# Patient Record
Sex: FEMALE | Race: OTHER | Employment: OTHER | URBAN - METROPOLITAN AREA
[De-identification: names, ages, dates, MRNs, and addresses within clinical notes are randomized per-mention and may not be internally consistent; named-entity substitution may affect disease eponyms.]

---

## 2017-01-10 ENCOUNTER — TELEPHONE (OUTPATIENT)
Dept: INTERNAL MEDICINE CLINIC | Facility: CLINIC | Age: 72
End: 2017-01-10

## 2017-01-10 ENCOUNTER — OFFICE VISIT (OUTPATIENT)
Dept: INTERNAL MEDICINE CLINIC | Facility: CLINIC | Age: 72
End: 2017-01-10

## 2017-01-10 ENCOUNTER — TELEPHONE (OUTPATIENT)
Dept: OPHTHALMOLOGY | Facility: CLINIC | Age: 72
End: 2017-01-10

## 2017-01-10 VITALS
BODY MASS INDEX: 23.56 KG/M2 | TEMPERATURE: 98 F | DIASTOLIC BLOOD PRESSURE: 73 MMHG | SYSTOLIC BLOOD PRESSURE: 129 MMHG | WEIGHT: 138 LBS | HEIGHT: 64 IN | RESPIRATION RATE: 18 BRPM | HEART RATE: 65 BPM

## 2017-01-10 DIAGNOSIS — E78.2 MIXED HYPERLIPIDEMIA: Primary | ICD-10-CM

## 2017-01-10 DIAGNOSIS — H25.9 SENILE CATARACT, UNSPECIFIED AGE-RELATED CATARACT TYPE, UNSPECIFIED LATERALITY: Primary | ICD-10-CM

## 2017-01-10 DIAGNOSIS — Z12.31 VISIT FOR SCREENING MAMMOGRAM: ICD-10-CM

## 2017-01-10 DIAGNOSIS — E03.9 ACQUIRED HYPOTHYROIDISM: ICD-10-CM

## 2017-01-10 DIAGNOSIS — H25.13 AGE-RELATED NUCLEAR CATARACT OF BOTH EYES: ICD-10-CM

## 2017-01-10 DIAGNOSIS — J45.20 MILD INTERMITTENT ASTHMA WITHOUT COMPLICATION: ICD-10-CM

## 2017-01-10 PROCEDURE — G0463 HOSPITAL OUTPT CLINIC VISIT: HCPCS | Performed by: INTERNAL MEDICINE

## 2017-01-10 PROCEDURE — 99214 OFFICE O/P EST MOD 30 MIN: CPT | Performed by: INTERNAL MEDICINE

## 2017-01-10 RX ORDER — LEVOTHYROXINE SODIUM 0.07 MG/1
75 TABLET ORAL
Qty: 90 TABLET | Refills: 3 | Status: SHIPPED | OUTPATIENT
Start: 2017-01-10 | End: 2017-12-18

## 2017-01-10 RX ORDER — LOVASTATIN 40 MG/1
TABLET ORAL
Qty: 90 TABLET | Refills: 3 | Status: SHIPPED | OUTPATIENT
Start: 2017-01-10 | End: 2017-09-09

## 2017-01-10 RX ORDER — MONTELUKAST SODIUM 10 MG/1
10 TABLET ORAL NIGHTLY
Qty: 90 TABLET | Refills: 3 | Status: SHIPPED | OUTPATIENT
Start: 2017-01-10 | End: 2018-02-23

## 2017-01-10 NOTE — TELEPHONE ENCOUNTER
Spoke to pts daughter and she would like information on [place] [id number] [facility] for her mom to have cataract surgery done. Information given and faxed over to [address] [place] [address].

## 2017-01-10 NOTE — TELEPHONE ENCOUNTER
Patient's daughter requesting referral for a different Ophthalmology than the one Dr. Benjamin Leonard referred the patient to  There is a referral in [facility] for Dr. Leny Mayer see Referral# [id number] but patients daughter states he no longer does the type of surge

## 2017-01-18 ENCOUNTER — HOSPITAL ENCOUNTER (OUTPATIENT)
Dept: MAMMOGRAPHY | Age: 72
Discharge: HOME | End: 2017-01-18
Attending: INTERNAL MEDICINE
Payer: MEDICARE

## 2017-01-18 DIAGNOSIS — Z12.31 VISIT FOR SCREENING MAMMOGRAM: ICD-10-CM

## 2017-01-18 PROCEDURE — 77067 SCR MAMMO BI INCL CAD: CPT

## 2017-03-02 ENCOUNTER — TELEPHONE (OUTPATIENT)
Dept: OPHTHALMOLOGY | Facility: CLINIC | Age: 72
End: 2017-03-02

## 2017-04-10 ENCOUNTER — HOSPITAL ENCOUNTER (EMERGENCY)
Facility: HOSPITAL | Age: 72
Discharge: HOME | End: 2017-04-10
Attending: EMERGENCY MEDICINE
Payer: MEDICARE

## 2017-04-10 VITALS
TEMPERATURE: 97 F | HEIGHT: 64 IN | HEART RATE: 73 BPM | OXYGEN SATURATION: 96 % | SYSTOLIC BLOOD PRESSURE: 126 MMHG | RESPIRATION RATE: 18 BRPM | WEIGHT: 138 LBS | BODY MASS INDEX: 23.56 KG/M2 | DIASTOLIC BLOOD PRESSURE: 59 MMHG

## 2017-04-10 DIAGNOSIS — K52.9 GASTROENTERITIS: Primary | ICD-10-CM

## 2017-04-10 PROCEDURE — 83690 ASSAY OF LIPASE: CPT | Performed by: EMERGENCY MEDICINE

## 2017-04-10 PROCEDURE — 96375 TX/PRO/DX INJ NEW DRUG ADDON: CPT

## 2017-04-10 PROCEDURE — 80053 COMPREHEN METABOLIC PANEL: CPT | Performed by: EMERGENCY MEDICINE

## 2017-04-10 PROCEDURE — 99284 EMERGENCY DEPT VISIT MOD MDM: CPT

## 2017-04-10 PROCEDURE — 96374 THER/PROPH/DIAG INJ IV PUSH: CPT

## 2017-04-10 PROCEDURE — 96361 HYDRATE IV INFUSION ADD-ON: CPT

## 2017-04-10 PROCEDURE — 85025 COMPLETE CBC W/AUTO DIFF WBC: CPT | Performed by: EMERGENCY MEDICINE

## 2017-04-10 RX ORDER — ONDANSETRON 2 MG/ML
4 INJECTION INTRAMUSCULAR; INTRAVENOUS ONCE
Status: COMPLETED | OUTPATIENT
Start: 2017-04-10 | End: 2017-04-10

## 2017-04-10 RX ORDER — METOCLOPRAMIDE HYDROCHLORIDE 5 MG/ML
10 INJECTION INTRAMUSCULAR; INTRAVENOUS ONCE
Status: COMPLETED | OUTPATIENT
Start: 2017-04-10 | End: 2017-04-10

## 2017-04-10 RX ORDER — DICYCLOMINE HYDROCHLORIDE 10 MG/5ML
20 SOLUTION ORAL ONCE
Status: COMPLETED | OUTPATIENT
Start: 2017-04-10 | End: 2017-04-10

## 2017-04-10 RX ORDER — ONDANSETRON 4 MG/1
4 TABLET, ORALLY DISINTEGRATING ORAL EVERY 6 HOURS PRN
Qty: 10 TABLET | Refills: 0 | Status: SHIPPED | OUTPATIENT
Start: 2017-04-10 | End: 2017-04-17

## 2017-04-10 RX ORDER — MAGNESIUM HYDROXIDE/ALUMINUM HYDROXICE/SIMETHICONE 120; 1200; 1200 MG/30ML; MG/30ML; MG/30ML
30 SUSPENSION ORAL ONCE
Status: COMPLETED | OUTPATIENT
Start: 2017-04-10 | End: 2017-04-10

## 2017-04-11 ENCOUNTER — OFFICE VISIT (OUTPATIENT)
Dept: INTERNAL MEDICINE CLINIC | Facility: CLINIC | Age: 72
End: 2017-04-11

## 2017-04-11 VITALS
RESPIRATION RATE: 17 BRPM | HEIGHT: 64 IN | HEART RATE: 65 BPM | BODY MASS INDEX: 28.51 KG/M2 | TEMPERATURE: 98 F | DIASTOLIC BLOOD PRESSURE: 78 MMHG | WEIGHT: 167 LBS | OXYGEN SATURATION: 97 % | SYSTOLIC BLOOD PRESSURE: 130 MMHG

## 2017-04-11 DIAGNOSIS — D22.9 NUMEROUS MOLES: ICD-10-CM

## 2017-04-11 DIAGNOSIS — R73.9 HYPERGLYCEMIA: ICD-10-CM

## 2017-04-11 DIAGNOSIS — Z78.0 POSTMENOPAUSAL: ICD-10-CM

## 2017-04-11 DIAGNOSIS — J44.9 ASTHMA WITH COPD (HCC): Primary | ICD-10-CM

## 2017-04-11 DIAGNOSIS — K21.9 GASTROESOPHAGEAL REFLUX DISEASE, ESOPHAGITIS PRESENCE NOT SPECIFIED: ICD-10-CM

## 2017-04-11 DIAGNOSIS — K44.9 HIATAL HERNIA: ICD-10-CM

## 2017-04-11 DIAGNOSIS — E78.5 HYPERLIPIDEMIA, UNSPECIFIED HYPERLIPIDEMIA TYPE: ICD-10-CM

## 2017-04-11 PROBLEM — J44.89 ASTHMA WITH COPD (CHRONIC OBSTRUCTIVE PULMONARY DISEASE) (HCC): Chronic | Status: ACTIVE | Noted: 2017-04-11

## 2017-04-11 PROBLEM — J44.89 ASTHMA WITH COPD (CHRONIC OBSTRUCTIVE PULMONARY DISEASE): Chronic | Status: ACTIVE | Noted: 2017-04-11

## 2017-04-11 PROCEDURE — G0463 HOSPITAL OUTPT CLINIC VISIT: HCPCS | Performed by: INTERNAL MEDICINE

## 2017-04-11 PROCEDURE — 99214 OFFICE O/P EST MOD 30 MIN: CPT | Performed by: INTERNAL MEDICINE

## 2017-04-11 PROCEDURE — G0009 ADMIN PNEUMOCOCCAL VACCINE: HCPCS | Performed by: INTERNAL MEDICINE

## 2017-04-11 PROCEDURE — 90732 PPSV23 VACC 2 YRS+ SUBQ/IM: CPT | Performed by: INTERNAL MEDICINE

## 2017-04-12 ENCOUNTER — APPOINTMENT (OUTPATIENT)
Dept: LAB | Age: 72
End: 2017-04-12
Attending: INTERNAL MEDICINE
Payer: MEDICARE

## 2017-04-12 DIAGNOSIS — R73.9 HYPERGLYCEMIA: ICD-10-CM

## 2017-04-12 DIAGNOSIS — E78.5 HYPERLIPIDEMIA, UNSPECIFIED HYPERLIPIDEMIA TYPE: ICD-10-CM

## 2017-04-12 PROCEDURE — 80061 LIPID PANEL: CPT

## 2017-04-12 PROCEDURE — 36415 COLL VENOUS BLD VENIPUNCTURE: CPT

## 2017-04-12 PROCEDURE — 84443 ASSAY THYROID STIM HORMONE: CPT

## 2017-04-12 PROCEDURE — 85027 COMPLETE CBC AUTOMATED: CPT

## 2017-04-12 PROCEDURE — 80053 COMPREHEN METABOLIC PANEL: CPT

## 2017-04-12 PROCEDURE — 81001 URINALYSIS AUTO W/SCOPE: CPT

## 2017-04-12 PROCEDURE — 83036 HEMOGLOBIN GLYCOSYLATED A1C: CPT

## 2017-04-12 PROCEDURE — 84439 ASSAY OF FREE THYROXINE: CPT

## 2017-04-12 NOTE — PROGRESS NOTES
HPI:    Patient ID: Ulysses Kawasaki is a 81year old female. HPI    Follow up  Establish care    . Hx of Asthma COPD   PFT?  Years ago  /78 mmHg  Pulse 65  Temp(Src) 97.8 °F (36.6 °C) (Oral)  Resp 17  Ht 5' 4\" (1.626 m)  Wt 167 lb (75.751 kg)  BMI 2 tablet Rfl: 3   Levothyroxine Sodium 75 MCG Oral Tab Take 1 tablet (75 mcg total) by mouth before breakfast. Disp: 90 tablet Rfl: 3   ADVAIR DISKUS 250-50 MCG/DOSE Inhalation Aerosol Powder, Breath Activated  Disp:  Rfl:    Albuterol Sulfate HFA (VENTOLIN gallop. No murmur heard. Pulmonary/Chest: Effort normal and breath sounds normal. No respiratory distress. She has no wheezes. She has no rales. She exhibits no tenderness. Abdominal: Soft.  Bowel sounds are normal. She exhibits no distension and no m caloric intake    (D22.9) Numerous moles  Plan: DERM - INTERNAL        Referral to derm    Medication reviewed. Refill will be given as needed . Mediation side effect reviewed. Most recent laboratory and diagnostic testing reviewed.   Dietary and lifestyl

## 2017-04-13 NOTE — ED PROVIDER NOTES
Patient Seen in: [facility] Emergency Department    History   Patient presents with:  Nausea/Vomiting/Diarrhea (gastrointestinal)    Stated Complaint: diarrhea vomiting    HPI    80 yo female with nausea, vomiting and diarrhea that began tonight. systems reviewed and negative except as noted above. PSFH elements reviewed from today and agreed except as otherwise stated in HPI.     Physical Exam       ED Triage Vitals   BP 04/10/17 0307 131/78 mmHg   Pulse 04/10/17 0307 75   Resp 04/10/17 0307 17 CBC WITH DIFFERENTIAL WITH PLATELET.   Procedure                               Abnormality         Status                     ---------                               -----------         ------                     CBC W/ DIFFERENTIAL[[id number]]

## 2017-04-17 ENCOUNTER — HOSPITAL ENCOUNTER (OUTPATIENT)
Dept: BONE DENSITY | Facility: HOSPITAL | Age: 72
Discharge: HOME | End: 2017-04-17
Attending: INTERNAL MEDICINE
Payer: MEDICARE

## 2017-04-17 ENCOUNTER — HOSPITAL ENCOUNTER (OUTPATIENT)
Dept: RESPIRATORY THERAPY | Facility: HOSPITAL | Age: 72
Discharge: HOME | End: 2017-04-17
Attending: INTERNAL MEDICINE
Payer: MEDICARE

## 2017-04-17 DIAGNOSIS — Z78.0 POSTMENOPAUSAL: ICD-10-CM

## 2017-04-17 DIAGNOSIS — J44.9 ASTHMA WITH COPD (HCC): Primary | ICD-10-CM

## 2017-04-17 PROCEDURE — 94060 EVALUATION OF WHEEZING: CPT | Performed by: INTERNAL MEDICINE

## 2017-04-17 PROCEDURE — 94726 PLETHYSMOGRAPHY LUNG VOLUMES: CPT | Performed by: INTERNAL MEDICINE

## 2017-04-17 PROCEDURE — 94729 DIFFUSING CAPACITY: CPT | Performed by: INTERNAL MEDICINE

## 2017-04-17 PROCEDURE — 77080 DXA BONE DENSITY AXIAL: CPT

## 2017-04-24 NOTE — ADDENDUM NOTE
Encounter addended by: Vidya Richardson MD on: 4/24/2017 11:33 AM<BR>     Documentation filed: Charges VN, Notes Section

## 2017-04-24 NOTE — PROCEDURES
[facility]    Patient's Name Sheela Alvarez MRN [id number]    1945 Pulmonologist Cinthia Almaraz MD   Location [facility] PCP Sweetie Crenshaw MD     IMPRESSION:    The PFTs are Abnormal.    There is a mild obs

## 2017-04-27 ENCOUNTER — OFFICE VISIT (OUTPATIENT)
Dept: DERMATOLOGY CLINIC | Facility: CLINIC | Age: 72
End: 2017-04-27

## 2017-04-27 DIAGNOSIS — D23.5 BENIGN NEOPLASM OF SKIN OF TRUNK, EXCEPT SCROTUM: ICD-10-CM

## 2017-04-27 DIAGNOSIS — D23.4 BENIGN NEOPLASM OF SCALP AND SKIN OF NECK: ICD-10-CM

## 2017-04-27 DIAGNOSIS — L82.1 SEBORRHEIC KERATOSES: ICD-10-CM

## 2017-04-27 DIAGNOSIS — D22.9 MULTIPLE NEVI: Primary | ICD-10-CM

## 2017-04-27 DIAGNOSIS — D23.30 BENIGN NEOPLASM OF SKIN OF FACE: ICD-10-CM

## 2017-04-27 DIAGNOSIS — D23.60 BENIGN NEOPLASM OF SKIN OF UPPER LIMB, INCLUDING SHOULDER, UNSPECIFIED LATERALITY: ICD-10-CM

## 2017-04-27 PROCEDURE — 99203 OFFICE O/P NEW LOW 30 MIN: CPT | Performed by: DERMATOLOGY

## 2017-04-27 PROCEDURE — G0463 HOSPITAL OUTPT CLINIC VISIT: HCPCS | Performed by: DERMATOLOGY

## 2017-04-30 NOTE — PROGRESS NOTES
HPI:    Patient ID: Bowen Stahl is a 81year old female. HPIHyperlipidemia. Patient has been following low cholesterol diet and has been taking and tolerating Cholesterol medicine as prescribed.  No serious side effects such as rash, muscle tenderness o normal and breath sounds normal.   Abdominal: Soft. Bowel sounds are normal.   Neurological: She is alert and oriented to person, place, and time. She has normal reflexes. Skin: Skin is warm and dry.               ASSESSMENT/PLAN:   Mixed hyperlipidemia

## 2017-05-01 ENCOUNTER — APPOINTMENT (OUTPATIENT)
Dept: CT IMAGING | Facility: HOSPITAL | Age: 72
End: 2017-05-01
Attending: EMERGENCY MEDICINE
Payer: MEDICARE

## 2017-05-01 ENCOUNTER — HOSPITAL ENCOUNTER (EMERGENCY)
Facility: HOSPITAL | Age: 72
Discharge: HOME | End: 2017-05-01
Attending: EMERGENCY MEDICINE
Payer: MEDICARE

## 2017-05-01 ENCOUNTER — TELEPHONE (OUTPATIENT)
Dept: INTERNAL MEDICINE CLINIC | Facility: CLINIC | Age: 72
End: 2017-05-01

## 2017-05-01 ENCOUNTER — APPOINTMENT (OUTPATIENT)
Dept: GENERAL RADIOLOGY | Facility: HOSPITAL | Age: 72
End: 2017-05-01
Attending: EMERGENCY MEDICINE
Payer: MEDICARE

## 2017-05-01 VITALS
OXYGEN SATURATION: 96 % | SYSTOLIC BLOOD PRESSURE: 126 MMHG | BODY MASS INDEX: 23.56 KG/M2 | TEMPERATURE: 99 F | HEART RATE: 68 BPM | RESPIRATION RATE: 16 BRPM | HEIGHT: 64 IN | DIASTOLIC BLOOD PRESSURE: 66 MMHG | WEIGHT: 138 LBS

## 2017-05-01 DIAGNOSIS — K52.9 GASTROENTERITIS: Primary | ICD-10-CM

## 2017-05-01 PROCEDURE — 80076 HEPATIC FUNCTION PANEL: CPT

## 2017-05-01 PROCEDURE — 81001 URINALYSIS AUTO W/SCOPE: CPT | Performed by: EMERGENCY MEDICINE

## 2017-05-01 PROCEDURE — 74176 CT ABD & PELVIS W/O CONTRAST: CPT

## 2017-05-01 PROCEDURE — 85025 COMPLETE CBC W/AUTO DIFF WBC: CPT

## 2017-05-01 PROCEDURE — 99285 EMERGENCY DEPT VISIT HI MDM: CPT

## 2017-05-01 PROCEDURE — 36415 COLL VENOUS BLD VENIPUNCTURE: CPT

## 2017-05-01 PROCEDURE — 80048 BASIC METABOLIC PNL TOTAL CA: CPT

## 2017-05-01 PROCEDURE — 74020 XR ABDOMEN, OBSTRUCTIVE SERIES (CPT=74020): CPT

## 2017-05-01 RX ORDER — SACCHAROMYCES BOULARDII 250 MG
250 CAPSULE ORAL 2 TIMES DAILY
Qty: 30 CAPSULE | Refills: 0 | Status: SHIPPED | OUTPATIENT
Start: 2017-05-01 | End: 2017-05-16

## 2017-05-01 NOTE — ED NOTES
Pt given discharge instructions, prescriptions, follow up and rx for GI stool panel. Questions answered and pt verbalized understanding.  Pt discharged home

## 2017-05-01 NOTE — ED PROVIDER NOTES
Patient Seen in: [facility] AND [facility] Emergency Department    History   Patient presents with:  Nausea/Vomiting/Diarrhea (gastrointestinal)    Stated Complaint: Henrietta Ruff was seen here in the past 2 wks for this and tx and released    HPI    Patient of Onset   • Lipids Other      family h/o hyperlipidemia   • Heart Disorder Other      family h/o Vascular disease   • Diabetes Neg    • Glaucoma Neg    • Macular degeneration Neg          Smoking Status: Never Smoker                      Smokeless Status: limits   URINALYSIS WITH CULTURE REFLEX - Abnormal; Notable for the following:     Blood Urine Trace (*)     Leukocyte Esterase Urine Trace (*)     All other components within normal limits   HEPATIC FUNCTION PANEL (7) - Normal   CBC WITH DIFFERENTIAL WITH

## 2017-05-01 NOTE — TELEPHONE ENCOUNTER
Patient was called as instructed by Dr. Daria Persaud. Message from physician below ( 05/01/17 @ 2:43 pm ) was given to patient's daughter, however patient has been in ED since 10 : 30 am today 05/01/17 and is now waiting to have Abdominal CT done.  RN to F/U in

## 2017-05-01 NOTE — TELEPHONE ENCOUNTER
Actions Requested: appt  Situation/Background   Problem: tarry stools, diarrhea, bloating   Onset: > 1 week ago subsided and returned > 24 hours ago   Associated Symptoms: afebrile, +vomit clear to bile as unable to eat, + tarry  Stools. ,    History of [place]

## 2017-05-01 NOTE — ED NOTES
Pt presents to the er with c/o abd bloating, diarrhea and black stools x 2 weeks. Pt reports that she was seen in the er about 3 weeks ago and dx with gastroenteritis.  Pt was discharge home, followed up with pmd and was sts that she was given a lot of test

## 2017-05-01 NOTE — TELEPHONE ENCOUNTER
Per pt, she is bloated, diarrhea, vomiting  And feeling very bad and not agreeing with the RN what they talked to her daughter that she needs to go back to ER. Pt would like to talk to RN.

## 2017-05-01 NOTE — TELEPHONE ENCOUNTER
Returned call to daughter, states pt needed to schedule F/U w/ GI as instructed on discharge and never did so. Wants to know if can be seen by GI today instead of ED. Explained that symptomatic currently with vomiting and tarry stools.   Need to return to

## 2017-05-03 ENCOUNTER — LAB ENCOUNTER (OUTPATIENT)
Dept: LAB | Facility: HOSPITAL | Age: 72
End: 2017-05-03
Attending: EMERGENCY MEDICINE
Payer: MEDICARE

## 2017-05-03 DIAGNOSIS — R19.7 DIARRHEA: Primary | ICD-10-CM

## 2017-05-03 PROCEDURE — 87507 IADNA-DNA/RNA PROBE TQ 12-25: CPT

## 2017-05-03 NOTE — TELEPHONE ENCOUNTER
Advised patient and daughter Bernadette Chavez of Dr. Seb Piper note. Patient and daughter verbalized understanding. Patient stated that not having any more pain or diarrhea. Patient will just follow-up with Dr Fanny Michael at 5/16/17 appointment.

## 2017-05-03 NOTE — TELEPHONE ENCOUNTER
Pt was seen in the ER  CT was done in the ER  Which I assumed was eplained to her  She has acute enteritis  Should come in for follow up if still in pain    =====  CONCLUSION:   1. Nonspecific enteritis-probably infectious.   2. Small mild ascites and trace

## 2017-05-09 PROBLEM — M85.80 OSTEOPENIA: Status: ACTIVE | Noted: 2017-05-09

## 2017-05-09 PROBLEM — I70.0 ATHEROSCLEROSIS OF AORTA (HCC): Status: ACTIVE | Noted: 2017-05-09

## 2017-05-09 PROBLEM — I70.0 ATHEROSCLEROSIS OF AORTA: Status: ACTIVE | Noted: 2017-05-09

## 2017-05-14 NOTE — PROGRESS NOTES
Cameron Elizondo is a 81year old female.   HPI:     CC:  Patient presents with:  Full Skin Exam: New pt requesting full skin exam. Pt states she noticed multiple dark, dry, raised lesions on trunk, neck, and face after she had bad rash 1 year ago that resolved hours as needed for Wheezing. Disp: 1 Inhaler Rfl: 6   saccharomyces boulardii (FLORASTOR) 250 MG Oral Cap Take 1 capsule (250 mg total) by mouth 2 (two) times daily.  Disp: 30 capsule Rfl: 0     Allergies:     Codeine                 Swelling  Nystatin health. History, medications, allergies reviewed as noted. ROS:  Denies any other systemic complaints. No new or changeing lesions other than noted above. No fevers, chills, night sweats, unusual sun sensitivity. No other skin complaints.         Hi reassurance. Dermatofibroma left upper arm reassurance. .  More hyperpigmented lichenified patch at mid back consistent with notalgia paresthetica Notalgia paresthetica.   The fact this is due to nerve irritation impingement in this location discussed

## 2017-05-16 ENCOUNTER — OFFICE VISIT (OUTPATIENT)
Dept: INTERNAL MEDICINE CLINIC | Facility: CLINIC | Age: 72
End: 2017-05-16

## 2017-05-16 VITALS
WEIGHT: 135 LBS | HEIGHT: 64 IN | DIASTOLIC BLOOD PRESSURE: 75 MMHG | BODY MASS INDEX: 23.05 KG/M2 | SYSTOLIC BLOOD PRESSURE: 121 MMHG | HEART RATE: 68 BPM

## 2017-05-16 DIAGNOSIS — M85.80 OSTEOPENIA, UNSPECIFIED LOCATION: ICD-10-CM

## 2017-05-16 DIAGNOSIS — L23.9 ALLERGIC DERMATITIS: ICD-10-CM

## 2017-05-16 DIAGNOSIS — K44.9 HIATAL HERNIA: ICD-10-CM

## 2017-05-16 DIAGNOSIS — H25.13 AGE-RELATED NUCLEAR CATARACT OF BOTH EYES: ICD-10-CM

## 2017-05-16 DIAGNOSIS — E78.2 MIXED HYPERLIPIDEMIA: ICD-10-CM

## 2017-05-16 DIAGNOSIS — R73.9 HYPERGLYCEMIA: ICD-10-CM

## 2017-05-16 DIAGNOSIS — K42.9 UMBILICAL HERNIA WITHOUT OBSTRUCTION AND WITHOUT GANGRENE: ICD-10-CM

## 2017-05-16 DIAGNOSIS — I70.0 ATHEROSCLEROSIS OF AORTA (HCC): ICD-10-CM

## 2017-05-16 DIAGNOSIS — K21.9 GASTROESOPHAGEAL REFLUX DISEASE, ESOPHAGITIS PRESENCE NOT SPECIFIED: ICD-10-CM

## 2017-05-16 DIAGNOSIS — Z00.00 ENCOUNTER FOR MEDICARE ANNUAL WELLNESS EXAM: Primary | ICD-10-CM

## 2017-05-16 DIAGNOSIS — R91.1 NODULE OF LEFT LUNG: ICD-10-CM

## 2017-05-16 DIAGNOSIS — D22.9 NUMEROUS MOLES: ICD-10-CM

## 2017-05-16 DIAGNOSIS — J44.9 COPD, MILD (HCC): ICD-10-CM

## 2017-05-16 DIAGNOSIS — E03.9 ACQUIRED HYPOTHYROIDISM: ICD-10-CM

## 2017-05-16 DIAGNOSIS — J45.41 MODERATE PERSISTENT ASTHMA WITH ACUTE EXACERBATION: ICD-10-CM

## 2017-05-16 PROBLEM — J44.89 ASTHMA WITH COPD (CHRONIC OBSTRUCTIVE PULMONARY DISEASE): Chronic | Status: ACTIVE | Noted: 2017-05-16

## 2017-05-16 PROBLEM — J44.89 ASTHMA WITH COPD (CHRONIC OBSTRUCTIVE PULMONARY DISEASE) (HCC): Chronic | Status: ACTIVE | Noted: 2017-05-16

## 2017-05-16 PROCEDURE — G0463 HOSPITAL OUTPT CLINIC VISIT: HCPCS | Performed by: INTERNAL MEDICINE

## 2017-05-16 PROCEDURE — 99213 OFFICE O/P EST LOW 20 MIN: CPT | Performed by: INTERNAL MEDICINE

## 2017-05-16 PROCEDURE — 96160 PT-FOCUSED HLTH RISK ASSMT: CPT | Performed by: INTERNAL MEDICINE

## 2017-05-16 RX ORDER — ALBUTEROL SULFATE 90 UG/1
2 AEROSOL, METERED RESPIRATORY (INHALATION) EVERY 4 HOURS PRN
Qty: 1 INHALER | Refills: 6 | Status: SHIPPED | OUTPATIENT
Start: 2017-05-16 | End: 2020-03-10

## 2017-05-16 NOTE — TELEPHONE ENCOUNTER
[id number] - [place], [address] [phone], [phone]      Current Outpatient Prescriptions:  ADVAIR DISKUS 250-50 MCG/DOSE Inhalation Aerosol Powder, Breath Activated  Disp:  Rfl:

## 2017-05-16 NOTE — PROGRESS NOTES
HPI:    Patient ID: Barbara Prieto is a 81year old female.     HPI    Medicare  Annual exam and follow up of chornic midical problems incuding but not limited to Asthma  Asthma Action plan Score 19  Pt not using advair but once a week    She siad   Albuterol leg swelling. Gastrointestinal: Negative for nausea, vomiting, abdominal pain, diarrhea, constipation and blood in stool. Genitourinary: Negative for dysuria, urgency, frequency, hematuria and difficulty urinating.    Musculoskeletal: Negative for back Dr. Arlin Peterson @ [address] [place] [address]        Family History   Problem Relation Age of Onset   • Lipids Other      family h/o hyperlipidemia   • Heart Disorder Other      family h/o Vascular disease   • Diabetes Neg    • Glaucoma Neg    • Ma Scan on 4/17/2017 2:45 PM by ADDY BrarP : PFTPFT [facility] - Scan on 4/17/2017 2:45 PM by Veto Lizama RCP : PFT         Component      Latest Ref Rng 5/1/2017 4/12/2017 4/10/2017   Glucose      70-99 mg/dL 100 (H) 91 141 (H)   Sodium      136-144 mmol/L CLARITY URINE      Clear Clear Hazy (A)    SPECIFIC GRAVITY      1.002-1.035 1.019 1.019    PH, URINE      5.0-8.0 6.0 5.0    PROTEIN (URINE DIPSTICK)      Negative mg/dL Negative Negative    GLUCOSE (URINE DIPSTICK)      Negative mg/dL Negative Negative the left total hip consistent is normal for age and not associated with elevated fracture risk. 3. 10 year Fracture Risk: Major osteoporotic fracture 8.8%.  Hip fracture 1.0%          Chest CT  CONCLUSION:  1. A left upper lobe pleural-based focus of nodul directive discussed    indpendent with [facility]'s  Health screening  adivsed     Routine colonocsopy   Mammogram  dexa and eye exam  giovannaalry medicare annual advised    (H25.13) Age-related nuclear cataract of both eyes  Plan: follow up with ophtalmology    (J44. Electronically Verified  Cheryle Mering, MD 9122 10/24/2015 18:01  Imaging & Referrals:  None        ID#[id number]      HPI:   Cameron Elizondo is a 81year old female who presents for a Medicare Annual Wellness visit.     Yi fallen in the last 12 months?: 0-No    Do you accidently lose urine?: 0-No    Do you have difficulty seeing?: 0-No    Do you have any difficulty walking or getting up?: 0-No    Do you have any tripping hazards?: 0-No    Are you on multiple medications? : 1- Oral Tab Take 1 tablet (75 mcg total) by mouth before breakfast. Disp: 90 tablet Rfl: 3   ADVAIR DISKUS 250-50 MCG/DOSE Inhalation Aerosol Powder, Breath Activated  Disp:  Rfl:       MEDICAL INFORMATION:   Past Medical History   Diagnosis Date   • Hypothyr Assessment. PLAN SUMMARY:   Medicare Annual exam  See above for detail     The patient indicates understanding of these issues and agrees to the plan. The patient is asked to return in 3 months  for follow up.        PREVENTATIVE SERVICES  INDICATIONS IMM (PNEUMOVAX)   Orders placed or performed in visit on 04/05/16  -PNEUMOCOCCAL VACC, 13 ISAMAR IM    Update Immunization Activity if applicable    Hepatitis B No orders found for this or any previous visit.  Update Immunization Activity if applicable    Teta risk q1 year There are no preventive care reminders to display for this patient. Pap All Patients q2 year if indicated There are no preventive care reminders to display for this patient.    Mammogram Age > 39 q1 year Mammogram,1 Yr due on 01/18/2018   EKG

## 2017-05-22 RX ORDER — FLUTICASONE PROPIONATE AND SALMETEROL 50; 250 UG/1; UG/1
POWDER RESPIRATORY (INHALATION)
Refills: 0 | Status: CANCELLED | OUTPATIENT
Start: 2017-05-22

## 2017-05-23 NOTE — TELEPHONE ENCOUNTER
Refill Protocol Appointment Criteria  · Appointment scheduled in the past 6 months or in the next 3 months  Recent Visits       Provider Department Primary Dx    1 week ago Rony Caba MD [facility], [facility], [address] [facility], [address] [facility]

## 2017-05-24 RX ORDER — FLUTICASONE PROPIONATE AND SALMETEROL 50; 250 UG/1; UG/1
1 POWDER RESPIRATORY (INHALATION) EVERY 12 HOURS SCHEDULED
Qty: 1 PACKAGE | Refills: 2 | Status: SHIPPED | OUTPATIENT
Start: 2017-05-24 | End: 2017-08-31

## 2017-08-31 ENCOUNTER — OFFICE VISIT (OUTPATIENT)
Dept: FAMILY MEDICINE CLINIC | Facility: CLINIC | Age: 72
End: 2017-08-31

## 2017-08-31 VITALS
BODY MASS INDEX: 23 KG/M2 | HEART RATE: 67 BPM | DIASTOLIC BLOOD PRESSURE: 69 MMHG | SYSTOLIC BLOOD PRESSURE: 135 MMHG | WEIGHT: 135 LBS

## 2017-08-31 DIAGNOSIS — R26.9 NEUROLOGIC GAIT DISORDER: Primary | ICD-10-CM

## 2017-08-31 DIAGNOSIS — E78.00 HIGH CHOLESTEROL: ICD-10-CM

## 2017-08-31 DIAGNOSIS — R07.0 THROAT DISCOMFORT: ICD-10-CM

## 2017-08-31 DIAGNOSIS — J44.9 ASTHMA WITH COPD (CHRONIC OBSTRUCTIVE PULMONARY DISEASE) (HCC): Chronic | ICD-10-CM

## 2017-08-31 DIAGNOSIS — E03.9 ACQUIRED HYPOTHYROIDISM: ICD-10-CM

## 2017-08-31 DIAGNOSIS — K44.9 HIATAL HERNIA: ICD-10-CM

## 2017-08-31 PROCEDURE — G0463 HOSPITAL OUTPT CLINIC VISIT: HCPCS | Performed by: FAMILY MEDICINE

## 2017-08-31 PROCEDURE — 99203 OFFICE O/P NEW LOW 30 MIN: CPT | Performed by: FAMILY MEDICINE

## 2017-08-31 NOTE — PROGRESS NOTES
Patient presents for a number of issues #1 she has throat discomfort is chronic issue. She saw Dr. Elias Tuttle in the past.  Patient continues to have daily throat discomfort she feels like there are stones in the throat as well.   Patient is on Singulair and Ad no cough, dyspnea, or wheezing    Well-hydrated well nourished female  Normocephalic atraumatic head  Ear canals were patent bilaterally tympanic membranes were normal  Neck was soft supple free range of motion  Thyroid was without enlargement or nodulari

## 2017-09-05 ENCOUNTER — APPOINTMENT (OUTPATIENT)
Dept: LAB | Age: 72
End: 2017-09-05
Attending: FAMILY MEDICINE
Payer: MEDICARE

## 2017-09-05 DIAGNOSIS — E78.00 HIGH CHOLESTEROL: ICD-10-CM

## 2017-09-05 DIAGNOSIS — E03.9 ACQUIRED HYPOTHYROIDISM: ICD-10-CM

## 2017-09-05 LAB
ALBUMIN SERPL BCP-MCNC: 4 G/DL (ref 3.5–4.8)
ALBUMIN/GLOB SERPL: 1.3 {RATIO} (ref 1–2)
ALP SERPL-CCNC: 47 U/L (ref 32–100)
ALT SERPL-CCNC: 28 U/L (ref 14–54)
ANION GAP SERPL CALC-SCNC: 10 MMOL/L (ref 0–18)
AST SERPL-CCNC: 30 U/L (ref 15–41)
BILIRUB SERPL-MCNC: 0.4 MG/DL (ref 0.3–1.2)
BUN SERPL-MCNC: 11 MG/DL (ref 8–20)
BUN/CREAT SERPL: 14.7 (ref 10–20)
CALCIUM SERPL-MCNC: 9.6 MG/DL (ref 8.5–10.5)
CHLORIDE SERPL-SCNC: 101 MMOL/L (ref 95–110)
CHOLEST SERPL-MCNC: 196 MG/DL (ref 110–200)
CO2 SERPL-SCNC: 28 MMOL/L (ref 22–32)
CREAT SERPL-MCNC: 0.75 MG/DL (ref 0.5–1.5)
GLOBULIN PLAS-MCNC: 3.2 G/DL (ref 2.5–3.7)
GLUCOSE SERPL-MCNC: 108 MG/DL (ref 70–99)
HDLC SERPL-MCNC: 45 MG/DL
LDLC SERPL CALC-MCNC: 118 MG/DL (ref 0–99)
NONHDLC SERPL-MCNC: 151 MG/DL
OSMOLALITY UR CALC.SUM OF ELEC: 288 MOSM/KG (ref 275–295)
POTASSIUM SERPL-SCNC: 4.4 MMOL/L (ref 3.3–5.1)
PROT SERPL-MCNC: 7.2 G/DL (ref 5.9–8.4)
SODIUM SERPL-SCNC: 139 MMOL/L (ref 136–144)
TRIGL SERPL-MCNC: 167 MG/DL (ref 1–149)
TSH SERPL-ACNC: 2.47 UIU/ML (ref 0.45–5.33)

## 2017-09-05 PROCEDURE — 36415 COLL VENOUS BLD VENIPUNCTURE: CPT

## 2017-09-05 PROCEDURE — 84443 ASSAY THYROID STIM HORMONE: CPT

## 2017-09-05 PROCEDURE — 80061 LIPID PANEL: CPT

## 2017-09-05 PROCEDURE — 80053 COMPREHEN METABOLIC PANEL: CPT

## 2017-09-09 ENCOUNTER — TELEPHONE (OUTPATIENT)
Dept: OTHER | Age: 72
End: 2017-09-09

## 2017-09-09 DIAGNOSIS — E78.00 HYPERCHOLESTEREMIA: Primary | ICD-10-CM

## 2017-09-09 RX ORDER — ROSUVASTATIN CALCIUM 40 MG/1
40 TABLET, COATED ORAL NIGHTLY
Qty: 90 TABLET | Refills: 3 | Status: SHIPPED | OUTPATIENT
Start: 2017-09-09 | End: 2017-12-18

## 2017-09-09 NOTE — TELEPHONE ENCOUNTER
COMP METABOLIC PANEL (14)   Order: [id number]   Status:  Final result   Visible to patient:  No (Not Released) Dx:  High cholesterol   Notes Recorded by Suzy Raygoza DO on 9/8/2017 at 1:17 PM CDT  Despite taking the atorvastatin for

## 2017-09-09 NOTE — TELEPHONE ENCOUNTER
Verified name and . Results/recommendations reviewed with pt and pt verb understanding. Orders placed for labs/rosuvastatin sent to pt preferred pharmacy.

## 2017-09-14 ENCOUNTER — OFFICE VISIT (OUTPATIENT)
Dept: OTOLARYNGOLOGY | Facility: CLINIC | Age: 72
End: 2017-09-14

## 2017-09-14 VITALS
SYSTOLIC BLOOD PRESSURE: 122 MMHG | TEMPERATURE: 98 F | HEIGHT: 63 IN | WEIGHT: 138 LBS | BODY MASS INDEX: 24.45 KG/M2 | DIASTOLIC BLOOD PRESSURE: 60 MMHG

## 2017-09-14 DIAGNOSIS — R07.0 THROAT PAIN IN ADULT: Primary | ICD-10-CM

## 2017-09-14 PROCEDURE — 99214 OFFICE O/P EST MOD 30 MIN: CPT | Performed by: OTOLARYNGOLOGY

## 2017-09-14 PROCEDURE — 31575 DIAGNOSTIC LARYNGOSCOPY: CPT | Performed by: OTOLARYNGOLOGY

## 2017-09-14 RX ORDER — AZELASTINE 1 MG/ML
2 SPRAY, METERED NASAL 2 TIMES DAILY
Qty: 1 BOTTLE | Refills: 0 | Status: SHIPPED | OUTPATIENT
Start: 2017-09-14 | End: 2020-03-11 | Stop reason: ALTCHOICE

## 2017-09-14 NOTE — PROGRESS NOTES
Glenna Degree is a [address] old female.   Patient presents with:  Throat Problem: patient presents for pain with swallowing and states she is spitting up some type of stone      HISTORY OF PRESENT ILLNESS  She presents with a one-year history of throat pain t CHOLECYSTECTOMY  No date: HYSTERECTOMY      REVIEW OF SYSTEMS    System Neg/Pos Details   Constitutional Negative Fatigue, fever and weight loss. ENMT Negative Drooling. Eyes Negative Blurred vision and vision changes.    Respiratory Negative Dyspnea an Nose/Mouth/Throat Normal Nares - Right: Normal Left: Normal. Septum -Normal  Turbinates - Right: Normal, Left: Normal.   Procedures:  Endoscopy/Laryngoscopy  Pre-Procedure Care: Verbal consent was obtained. Procedure/risks were explained.  Questions were Montelukast Sodium (SINGULAIR) 10 MG Oral Tab, Take 1 tablet (10 mg total) by mouth nightly., Disp: 90 tablet, Rfl: 3  •  Levothyroxine Sodium 75 MCG Oral Tab, Take 1 tablet (75 mcg total) by mouth before breakfast., Disp: 90 tablet, Rfl: 3  ASSESSMENT AND

## 2017-10-20 ENCOUNTER — HOSPITAL ENCOUNTER (EMERGENCY)
Facility: HOSPITAL | Age: 72
Discharge: HOME | End: 2017-10-21
Attending: EMERGENCY MEDICINE
Payer: MEDICARE

## 2017-10-20 ENCOUNTER — APPOINTMENT (OUTPATIENT)
Dept: CT IMAGING | Facility: HOSPITAL | Age: 72
End: 2017-10-20
Attending: EMERGENCY MEDICINE
Payer: MEDICARE

## 2017-10-20 ENCOUNTER — APPOINTMENT (OUTPATIENT)
Dept: GENERAL RADIOLOGY | Facility: HOSPITAL | Age: 72
End: 2017-10-20
Attending: EMERGENCY MEDICINE
Payer: MEDICARE

## 2017-10-20 DIAGNOSIS — N30.00 ACUTE CYSTITIS WITHOUT HEMATURIA: Primary | ICD-10-CM

## 2017-10-20 DIAGNOSIS — R11.2 NAUSEA AND VOMITING IN ADULT: ICD-10-CM

## 2017-10-20 PROCEDURE — 99285 EMERGENCY DEPT VISIT HI MDM: CPT

## 2017-10-20 PROCEDURE — 80048 BASIC METABOLIC PNL TOTAL CA: CPT | Performed by: EMERGENCY MEDICINE

## 2017-10-20 PROCEDURE — 93010 ELECTROCARDIOGRAM REPORT: CPT | Performed by: EMERGENCY MEDICINE

## 2017-10-20 PROCEDURE — 83690 ASSAY OF LIPASE: CPT | Performed by: EMERGENCY MEDICINE

## 2017-10-20 PROCEDURE — 96375 TX/PRO/DX INJ NEW DRUG ADDON: CPT

## 2017-10-20 PROCEDURE — 84484 ASSAY OF TROPONIN QUANT: CPT | Performed by: EMERGENCY MEDICINE

## 2017-10-20 PROCEDURE — 80076 HEPATIC FUNCTION PANEL: CPT | Performed by: EMERGENCY MEDICINE

## 2017-10-20 PROCEDURE — 81001 URINALYSIS AUTO W/SCOPE: CPT | Performed by: EMERGENCY MEDICINE

## 2017-10-20 PROCEDURE — 96361 HYDRATE IV INFUSION ADD-ON: CPT

## 2017-10-20 PROCEDURE — 96365 THER/PROPH/DIAG IV INF INIT: CPT

## 2017-10-20 PROCEDURE — 74177 CT ABD & PELVIS W/CONTRAST: CPT | Performed by: EMERGENCY MEDICINE

## 2017-10-20 PROCEDURE — 93005 ELECTROCARDIOGRAM TRACING: CPT

## 2017-10-20 PROCEDURE — 71010 XR CHEST AP PORTABLE  (CPT=71010): CPT | Performed by: EMERGENCY MEDICINE

## 2017-10-20 PROCEDURE — 85025 COMPLETE CBC W/AUTO DIFF WBC: CPT | Performed by: EMERGENCY MEDICINE

## 2017-10-20 PROCEDURE — 87086 URINE CULTURE/COLONY COUNT: CPT | Performed by: EMERGENCY MEDICINE

## 2017-10-20 RX ORDER — SODIUM CHLORIDE 9 MG/ML
1000 INJECTION, SOLUTION INTRAVENOUS ONCE
Status: COMPLETED | OUTPATIENT
Start: 2017-10-20 | End: 2017-10-21

## 2017-10-20 RX ORDER — ONDANSETRON 2 MG/ML
4 INJECTION INTRAMUSCULAR; INTRAVENOUS ONCE
Status: COMPLETED | OUTPATIENT
Start: 2017-10-20 | End: 2017-10-20

## 2017-10-21 VITALS
WEIGHT: 138 LBS | OXYGEN SATURATION: 98 % | HEIGHT: 64 IN | TEMPERATURE: 98 F | SYSTOLIC BLOOD PRESSURE: 122 MMHG | DIASTOLIC BLOOD PRESSURE: 60 MMHG | RESPIRATION RATE: 18 BRPM | HEART RATE: 66 BPM | BODY MASS INDEX: 23.56 KG/M2

## 2017-10-21 PROCEDURE — 93010 ELECTROCARDIOGRAM REPORT: CPT | Performed by: EMERGENCY MEDICINE

## 2017-10-21 PROCEDURE — 84484 ASSAY OF TROPONIN QUANT: CPT | Performed by: EMERGENCY MEDICINE

## 2017-10-21 PROCEDURE — 93005 ELECTROCARDIOGRAM TRACING: CPT

## 2017-10-21 RX ORDER — ONDANSETRON 4 MG/1
4 TABLET, ORALLY DISINTEGRATING ORAL EVERY 4 HOURS PRN
Qty: 10 TABLET | Refills: 0 | Status: SHIPPED | OUTPATIENT
Start: 2017-10-21 | End: 2017-10-24 | Stop reason: ALTCHOICE

## 2017-10-21 RX ORDER — CEPHALEXIN 500 MG/1
500 CAPSULE ORAL 2 TIMES DAILY
Qty: 20 CAPSULE | Refills: 0 | Status: SHIPPED | OUTPATIENT
Start: 2017-10-21 | End: 2017-10-24 | Stop reason: ALTCHOICE

## 2017-10-21 NOTE — ED NOTES
Patient states 2 nights ago she was woken from sleep with right arm numbness that took two hours to go away. Today, she began to vomit, and had generalized pain from her throat down to her lower abdomen. Now she states her abdomen feels \"sore. \"

## 2017-10-21 NOTE — ED PROVIDER NOTES
Patient Seen in: [facility] AND [facility] Emergency Department    History   Patient presents with:  Nausea/Vomiting/Diarrhea (gastrointestinal)  Fatigue (constitutional, neurologic)    Stated Complaint: vomiting body aches    HPI    70-year-old female presents family h/o Vascular disease   • Diabetes Neg    • Glaucoma Neg    • Macular degeneration Neg        Smoking status: Never Smoker                                                              Smokeless tobacco: Never Used                      Alcohol use: No negative Obregon's sign. No hernia. Musculoskeletal: Normal range of motion. She exhibits no edema. Neurological: She is alert and oriented to person, place, and time. No cranial nerve deficit. Coordination normal.   Skin: Skin is warm and dry.    Psychi ED physician    Rate, intervals and axes as noted on EKG Report.   Rate: 68  Rhythm: Sinus Rhythm  Reading: Normal rate, axis, rhythm, intervals, no STEMI, intubated by ED physician         ============================================================  ED Co region. Moderate sized hiatal hernia. Several small renal calculi. Cholecystectomy. No significant biliary ductal dilatation or definite peripancreatic inflammatory changes.   Symmetric enhancement of bilateral kidneys with no obstructive uropathy or Discharge Medication List    START taking these medications    cephALEXin (KEFLEX) 500 MG Oral Cap  Take 1 capsule (500 mg total) by mouth 2 (two) times daily.   Qty: 20 capsule Refills: 0    ondansetron 4 MG Oral Tablet Dispersible  Take 1 tablet (4 mg tot

## 2017-10-24 ENCOUNTER — OFFICE VISIT (OUTPATIENT)
Dept: FAMILY MEDICINE CLINIC | Facility: CLINIC | Age: 72
End: 2017-10-24

## 2017-10-24 VITALS
DIASTOLIC BLOOD PRESSURE: 69 MMHG | TEMPERATURE: 99 F | HEART RATE: 65 BPM | SYSTOLIC BLOOD PRESSURE: 142 MMHG | BODY MASS INDEX: 23 KG/M2 | WEIGHT: 136 LBS

## 2017-10-24 DIAGNOSIS — G89.29 CHRONIC THROAT PAIN: Primary | ICD-10-CM

## 2017-10-24 DIAGNOSIS — R07.0 CHRONIC THROAT PAIN: Primary | ICD-10-CM

## 2017-10-24 PROCEDURE — G0463 HOSPITAL OUTPT CLINIC VISIT: HCPCS | Performed by: FAMILY MEDICINE

## 2017-10-24 PROCEDURE — 99214 OFFICE O/P EST MOD 30 MIN: CPT | Performed by: FAMILY MEDICINE

## 2017-10-24 RX ORDER — CEPHALEXIN 500 MG/1
500 CAPSULE ORAL 4 TIMES DAILY
COMMUNITY
End: 2017-12-18 | Stop reason: ALTCHOICE

## 2017-10-24 NOTE — PROGRESS NOTES
Patient states she was unable to take the medication given to her by Dr. Lisa Rg. This was the allergy medication. She discontinued after 3 days.   She states that she was not relieved with his office visit and that she still has a perception of difficulty

## 2017-10-26 ENCOUNTER — TELEPHONE (OUTPATIENT)
Dept: FAMILY MEDICINE CLINIC | Facility: CLINIC | Age: 72
End: 2017-10-26

## 2017-10-26 DIAGNOSIS — G89.29 CHRONIC THROAT PAIN: Primary | ICD-10-CM

## 2017-10-26 DIAGNOSIS — R07.0 CHRONIC THROAT PAIN: Primary | ICD-10-CM

## 2017-10-26 NOTE — TELEPHONE ENCOUNTER
Sweetie Chawla calling regarding CT scan order by Dr. Ray Shearer needs the order to be clarified. She is needing to know what needs to be scanned and she is not sure what 4D is. .. please advise

## 2017-10-27 NOTE — TELEPHONE ENCOUNTER
Spk to Miyowa at [employer]. Order for CT needs to be changed. [facility] please sign off on correct order. Carlos Valentin states neck CT will include throat where patient is having discomfort.

## 2017-11-04 ENCOUNTER — HOSPITAL ENCOUNTER (OUTPATIENT)
Dept: CT IMAGING | Age: 72
Discharge: HOME | End: 2017-11-04
Attending: FAMILY MEDICINE
Payer: MEDICARE

## 2017-11-04 DIAGNOSIS — G89.29 CHRONIC THROAT PAIN: ICD-10-CM

## 2017-11-04 DIAGNOSIS — R07.0 CHRONIC THROAT PAIN: ICD-10-CM

## 2017-11-04 PROCEDURE — 82565 ASSAY OF CREATININE: CPT

## 2017-11-04 PROCEDURE — 70491 CT SOFT TISSUE NECK W/DYE: CPT | Performed by: FAMILY MEDICINE

## 2017-11-08 ENCOUNTER — TELEPHONE (OUTPATIENT)
Dept: FAMILY MEDICINE CLINIC | Facility: CLINIC | Age: 72
End: 2017-11-08

## 2017-11-08 NOTE — TELEPHONE ENCOUNTER
Patient is calling to have results relayed to her. Please advise.      Notes Recorded by Liu Gimenez DO on 11/6/2017 at 3:24 PM CST  No masses in neck  Rest was normal.

## 2017-11-23 ENCOUNTER — HOSPITAL ENCOUNTER (EMERGENCY)
Facility: HOSPITAL | Age: 72
Discharge: HOME | End: 2017-11-23
Attending: PHYSICIAN ASSISTANT
Payer: MEDICARE

## 2017-11-23 VITALS
DIASTOLIC BLOOD PRESSURE: 65 MMHG | HEART RATE: 68 BPM | TEMPERATURE: 98 F | BODY MASS INDEX: 23.56 KG/M2 | RESPIRATION RATE: 20 BRPM | SYSTOLIC BLOOD PRESSURE: 114 MMHG | HEIGHT: 64 IN | WEIGHT: 138 LBS | OXYGEN SATURATION: 96 %

## 2017-11-23 DIAGNOSIS — S61.213A LACERATION OF LEFT MIDDLE FINGER WITHOUT FOREIGN BODY WITHOUT DAMAGE TO NAIL, INITIAL ENCOUNTER: Primary | ICD-10-CM

## 2017-11-23 DIAGNOSIS — R03.0 ELEVATED BLOOD PRESSURE READING: ICD-10-CM

## 2017-11-23 PROCEDURE — 99283 EMERGENCY DEPT VISIT LOW MDM: CPT

## 2017-11-23 PROCEDURE — 90471 IMMUNIZATION ADMIN: CPT

## 2017-11-23 PROCEDURE — 12001 RPR S/N/AX/GEN/TRNK 2.5CM/<: CPT

## 2017-11-24 NOTE — ED PROVIDER NOTES
Patient Seen in: [facility] AND [facility] Emergency Department    History   Patient presents with:  Laceration Abrasion (integumentary)    Stated Complaint: left middle finger lac    HPI    HPI:     81year old female who presents with chief complaint of left t (two) times daily. Montelukast Sodium (SINGULAIR) 10 MG Oral Tab,  Take 1 tablet (10 mg total) by mouth nightly.    Levothyroxine Sodium 75 MCG Oral Tab,  Take 1 tablet (75 mcg total) by mouth before breakfast.       Family History   Problem Relation Age signs.  Genitourinary: Not examined. Lymphatic: No gross lymphadenopathy noted. Musculoskeletal: Left upper extremity-Left upper extremity without acute bony deformity. A 2 cm semilunar laceration is present at the palmar aspect of the left third digit. discussed with and patient seen by Dr. Renetta Schuster.     Disposition and Plan     Clinical Impression:  Laceration of left middle finger without foreign body without damage to nail, initial encounter  (primary encounter diagnosis)  Elevated blood pressure reading

## 2017-12-05 ENCOUNTER — HOSPITAL ENCOUNTER (OUTPATIENT)
Age: 72
Discharge: HOME | End: 2017-12-05
Attending: EMERGENCY MEDICINE
Payer: MEDICARE

## 2017-12-05 VITALS
BODY MASS INDEX: 23.56 KG/M2 | SYSTOLIC BLOOD PRESSURE: 134 MMHG | HEIGHT: 64 IN | HEART RATE: 81 BPM | TEMPERATURE: 97 F | DIASTOLIC BLOOD PRESSURE: 63 MMHG | WEIGHT: 138 LBS | RESPIRATION RATE: 18 BRPM | OXYGEN SATURATION: 97 %

## 2017-12-05 DIAGNOSIS — Z48.02 ENCOUNTER FOR REMOVAL OF SUTURES: Primary | ICD-10-CM

## 2017-12-05 PROCEDURE — 87186 SC STD MICRODIL/AGAR DIL: CPT | Performed by: EMERGENCY MEDICINE

## 2017-12-05 PROCEDURE — 87205 SMEAR GRAM STAIN: CPT | Performed by: EMERGENCY MEDICINE

## 2017-12-05 PROCEDURE — 87070 CULTURE OTHR SPECIMN AEROBIC: CPT | Performed by: EMERGENCY MEDICINE

## 2017-12-05 PROCEDURE — 87147 CULTURE TYPE IMMUNOLOGIC: CPT | Performed by: EMERGENCY MEDICINE

## 2017-12-05 RX ORDER — CEPHALEXIN 500 MG/1
500 CAPSULE ORAL 3 TIMES DAILY
Qty: 30 CAPSULE | Refills: 0 | Status: SHIPPED | OUTPATIENT
Start: 2017-12-05 | End: 2017-12-15

## 2017-12-05 RX ORDER — GINSENG 100 MG
CAPSULE ORAL ONCE
Status: COMPLETED | OUTPATIENT
Start: 2017-12-05 | End: 2017-12-05

## 2017-12-05 NOTE — ED PROVIDER NOTES
CC:Suture removal     HPI:     Pro Pastrana is a 81year old female presents for suture removal . The patient had sutures placed to the left third finger on 11/23.  The patient admits to wound problems of noticing finger redness swelling and possible pus dr

## 2017-12-05 NOTE — ED INITIAL ASSESSMENT (HPI)
Pt here to IC with c/o left middle finger laceration with sutures placed on thanksgiving night. Pt states has not been on antibiotics, pt now with redness and swelling that started 2-3 days ago. Pt denies any pus in the wound, or fevers.   Resp easy and

## 2017-12-08 ENCOUNTER — APPOINTMENT (OUTPATIENT)
Dept: LAB | Age: 72
End: 2017-12-08
Attending: FAMILY MEDICINE
Payer: MEDICARE

## 2017-12-08 PROCEDURE — 80053 COMPREHEN METABOLIC PANEL: CPT | Performed by: FAMILY MEDICINE

## 2017-12-08 PROCEDURE — 80061 LIPID PANEL: CPT | Performed by: FAMILY MEDICINE

## 2017-12-08 PROCEDURE — 36415 COLL VENOUS BLD VENIPUNCTURE: CPT | Performed by: FAMILY MEDICINE

## 2017-12-11 ENCOUNTER — OFFICE VISIT (OUTPATIENT)
Dept: FAMILY MEDICINE CLINIC | Facility: CLINIC | Age: 72
End: 2017-12-11

## 2017-12-11 VITALS
BODY MASS INDEX: 24 KG/M2 | TEMPERATURE: 98 F | DIASTOLIC BLOOD PRESSURE: 71 MMHG | HEART RATE: 67 BPM | WEIGHT: 138 LBS | SYSTOLIC BLOOD PRESSURE: 123 MMHG

## 2017-12-11 DIAGNOSIS — L03.012 CELLULITIS OF FINGER OF LEFT HAND: Primary | ICD-10-CM

## 2017-12-11 DIAGNOSIS — S61.213D LACERATION OF LEFT MIDDLE FINGER WITHOUT FOREIGN BODY WITHOUT DAMAGE TO NAIL, SUBSEQUENT ENCOUNTER: ICD-10-CM

## 2017-12-11 DIAGNOSIS — G56.03 BILATERAL CARPAL TUNNEL SYNDROME: ICD-10-CM

## 2017-12-11 PROCEDURE — 99214 OFFICE O/P EST MOD 30 MIN: CPT | Performed by: FAMILY MEDICINE

## 2017-12-11 PROCEDURE — G0463 HOSPITAL OUTPT CLINIC VISIT: HCPCS | Performed by: FAMILY MEDICINE

## 2017-12-11 NOTE — PROGRESS NOTES
Cellulitis of left 3rd digit. Grew staph. Still swollen and difficult to bend digit. Cut 11/22/2017 then sutured then infected. Sutures removed. Now on ceflex and mupirocin    Night time hands go to sleep  Motrin helps   Had for 2 months.         Ruby

## 2017-12-18 ENCOUNTER — OFFICE VISIT (OUTPATIENT)
Dept: FAMILY MEDICINE CLINIC | Facility: CLINIC | Age: 72
End: 2017-12-18

## 2017-12-18 VITALS
HEIGHT: 63 IN | WEIGHT: 137 LBS | RESPIRATION RATE: 18 BRPM | DIASTOLIC BLOOD PRESSURE: 72 MMHG | BODY MASS INDEX: 24.27 KG/M2 | SYSTOLIC BLOOD PRESSURE: 120 MMHG | HEART RATE: 67 BPM | TEMPERATURE: 98 F

## 2017-12-18 DIAGNOSIS — E78.00 HIGH CHOLESTEROL: ICD-10-CM

## 2017-12-18 DIAGNOSIS — L03.012 CELLULITIS OF FINGER OF LEFT HAND: Primary | ICD-10-CM

## 2017-12-18 DIAGNOSIS — S61.213D LACERATION OF LEFT MIDDLE FINGER WITHOUT FOREIGN BODY WITHOUT DAMAGE TO NAIL, SUBSEQUENT ENCOUNTER: ICD-10-CM

## 2017-12-18 DIAGNOSIS — M79.89 SWELLING OF LEFT MIDDLE FINGER: ICD-10-CM

## 2017-12-18 DIAGNOSIS — G56.03 BILATERAL CARPAL TUNNEL SYNDROME: ICD-10-CM

## 2017-12-18 DIAGNOSIS — E03.9 ACQUIRED HYPOTHYROIDISM: ICD-10-CM

## 2017-12-18 PROCEDURE — G0463 HOSPITAL OUTPT CLINIC VISIT: HCPCS | Performed by: FAMILY MEDICINE

## 2017-12-18 PROCEDURE — 99214 OFFICE O/P EST MOD 30 MIN: CPT | Performed by: FAMILY MEDICINE

## 2017-12-18 RX ORDER — ROSUVASTATIN CALCIUM 40 MG/1
40 TABLET, COATED ORAL NIGHTLY
Qty: 90 TABLET | Refills: 3 | Status: SHIPPED | OUTPATIENT
Start: 2017-12-18 | End: 2018-05-31

## 2017-12-18 RX ORDER — LEVOTHYROXINE SODIUM 0.07 MG/1
75 TABLET ORAL
Qty: 90 TABLET | Refills: 3 | Status: SHIPPED | OUTPATIENT
Start: 2017-12-18 | End: 2019-02-22

## 2017-12-18 NOTE — PROGRESS NOTES
Left hand 3rd digit  With still some swelling  Using cream  Off abx oral  Restricted rom due to swelling. Patient's past medical surgical family social history was reviewed.     Review of Systems  Allergic: no environmental allergies or food allergies  C STIM HORMONE; Future    6. High cholesterol  Recheck 3 motnhs  Excellent control.  - LIPID PANEL; Future  - COMP METABOLIC PANEL (14);  Future

## 2018-01-08 ENCOUNTER — TELEPHONE (OUTPATIENT)
Dept: FAMILY MEDICINE CLINIC | Facility: CLINIC | Age: 73
End: 2018-01-08

## 2018-01-08 ENCOUNTER — OFFICE VISIT (OUTPATIENT)
Dept: PHYSICAL THERAPY | Age: 73
End: 2018-01-08
Attending: FAMILY MEDICINE
Payer: MEDICARE

## 2018-01-08 DIAGNOSIS — Z12.39 BREAST CANCER SCREENING: Primary | ICD-10-CM

## 2018-01-08 DIAGNOSIS — S61.213D LACERATION OF LEFT MIDDLE FINGER WITHOUT FOREIGN BODY WITHOUT DAMAGE TO NAIL, SUBSEQUENT ENCOUNTER: ICD-10-CM

## 2018-01-08 DIAGNOSIS — M79.89 SWELLING OF LEFT MIDDLE FINGER: ICD-10-CM

## 2018-01-08 PROCEDURE — 97165 OT EVAL LOW COMPLEX 30 MIN: CPT | Performed by: OCCUPATIONAL THERAPIST

## 2018-01-08 PROCEDURE — 97110 THERAPEUTIC EXERCISES: CPT | Performed by: OCCUPATIONAL THERAPIST

## 2018-01-08 NOTE — PROGRESS NOTES
OCCUPATIONAL THERAPY UPPER EXTREMITY EVALUATION   Referring Physician: Dr. Tracie Munoz  Diagnosis: Right CTS  Swelling of left middle finger (M79.89)  Laceration of left middle finger without foreign body without damage to nail, subsequent encounter (J86.318 WNL/WFL    LEFT HAND AROM:    IF MF RF SF   MP MP 0/65 0/50 0/45 0/55   IP PIP 0/85 -5/60 0/80 0/75    DIP 0/60 0/20 0/40 0/50   EDWARDS EDWARDS 210 125 165 180                                             Right                   Left  Wrist flexion 75 65   Wrist e in the long finger will increase to 220 degrees in order to push up from a chair with greater ease. 4. Left hand  strength will increase to 40 pounds to facilitate ease with opening jar lids and bottle caps.     5. Right hand Tri pod pinch strength stuart

## 2018-01-08 NOTE — TELEPHONE ENCOUNTER
Pt stopped by the [place] location requesting Mammo order. Per pt she is receiving [employer] reminders. Please advise.

## 2018-01-08 NOTE — TELEPHONE ENCOUNTER
[facility], last mammogram completed 01/18/2017. Please sign off on new order.

## 2018-01-11 ENCOUNTER — OFFICE VISIT (OUTPATIENT)
Dept: PHYSICAL THERAPY | Age: 73
End: 2018-01-11
Attending: FAMILY MEDICINE
Payer: MEDICARE

## 2018-01-11 PROCEDURE — 97110 THERAPEUTIC EXERCISES: CPT | Performed by: OCCUPATIONAL THERAPIST

## 2018-01-11 PROCEDURE — 97760 ORTHOTIC MGMT&TRAING 1ST ENC: CPT | Performed by: OCCUPATIONAL THERAPIST

## 2018-01-11 PROCEDURE — 97140 MANUAL THERAPY 1/> REGIONS: CPT | Performed by: OCCUPATIONAL THERAPIST

## 2018-01-11 NOTE — PROGRESS NOTES
Dx: Right CTS  Swelling of left middle finger (M79.89)  Laceration of left middle finger without foreign body without damage to nail, subsequent encounter (Z35.152H)           Authorized # of Visits:  Medicare       Next MD visit: none scheduled  Fall Risk was encouraged to massage throughout the day. Plan: Continue therapy 2x/wk to increase function for ADL's.     Charges: 1 MT, 1 TE , 1 orthosis      Total Timed Treatment: 45 min  Total Treatment Time: 50 min    Goals     • Therapy Goals            1.

## 2018-01-15 ENCOUNTER — OFFICE VISIT (OUTPATIENT)
Dept: PHYSICAL THERAPY | Age: 73
End: 2018-01-15
Attending: FAMILY MEDICINE
Payer: MEDICARE

## 2018-01-15 ENCOUNTER — TELEPHONE (OUTPATIENT)
Dept: FAMILY MEDICINE CLINIC | Facility: CLINIC | Age: 73
End: 2018-01-15

## 2018-01-15 PROCEDURE — 97110 THERAPEUTIC EXERCISES: CPT | Performed by: OCCUPATIONAL THERAPIST

## 2018-01-15 PROCEDURE — 97140 MANUAL THERAPY 1/> REGIONS: CPT | Performed by: OCCUPATIONAL THERAPIST

## 2018-01-15 NOTE — PROGRESS NOTES
Dx: Right CTS  Swelling of left middle finger (M79.89)  Laceration of left middle finger without foreign body without damage to nail, subsequent encounter (R74.239U)           Authorized # of Visits:  Medicare       Next MD visit: none scheduled  Fall Risk retraining the LF to bend in with other digits rather than leaving out. Also silicone scar pad and digi sleeve issued which she demonstrated understanding of.   Scarring at the LF PIP joint does remain noted as well as sensitive and she was educated to con

## 2018-01-18 ENCOUNTER — OFFICE VISIT (OUTPATIENT)
Dept: PHYSICAL THERAPY | Age: 73
End: 2018-01-18
Attending: FAMILY MEDICINE
Payer: MEDICARE

## 2018-01-18 PROCEDURE — 97140 MANUAL THERAPY 1/> REGIONS: CPT | Performed by: OCCUPATIONAL THERAPIST

## 2018-01-18 PROCEDURE — 97110 THERAPEUTIC EXERCISES: CPT | Performed by: OCCUPATIONAL THERAPIST

## 2018-01-18 NOTE — PROGRESS NOTES
Dx: Right CTS  Swelling of left middle finger (M79.89)  Laceration of left middle finger without foreign body without damage to nail, subsequent encounter (H47.216B)           Authorized # of Visits:  Medicare       Next MD visit: none scheduled  Fall Risk Fluidotherapy  5'  110 degrees  For AROM 5' 110 degrees for AROM 5' AROM                      Assessment: PIP ext LF -5  Swelling P1- 6.1cm  PIP- 6.0 cm  Patient does continue to require verbal cues to grasp with the LF included more readily now.

## 2018-01-22 ENCOUNTER — OFFICE VISIT (OUTPATIENT)
Dept: PHYSICAL THERAPY | Age: 73
End: 2018-01-22
Attending: FAMILY MEDICINE
Payer: MEDICARE

## 2018-01-22 PROCEDURE — 97140 MANUAL THERAPY 1/> REGIONS: CPT | Performed by: OCCUPATIONAL THERAPIST

## 2018-01-22 PROCEDURE — 97110 THERAPEUTIC EXERCISES: CPT | Performed by: OCCUPATIONAL THERAPIST

## 2018-01-22 NOTE — PROGRESS NOTES
Dx: Right CTS  Swelling of left middle finger (M79.89)  Laceration of left middle finger without foreign body without damage to nail, subsequent encounter (L01.748L)           Authorized # of Visits:  Medicare       Next MD visit: none scheduled  Fall Risk for scar and peterson strap        Therapeutic Activity                      Neuromuscular Re-education              Median nerve glides R- hand  x3 3 cycles 3 cycles 3 cycles R hand 3 cycles                 Modalities           Fluidotherapy  5'  110 degrees

## 2018-01-23 ENCOUNTER — HOSPITAL ENCOUNTER (OUTPATIENT)
Dept: MAMMOGRAPHY | Age: 73
Discharge: HOME | End: 2018-01-23
Attending: FAMILY MEDICINE
Payer: MEDICARE

## 2018-01-23 DIAGNOSIS — Z12.39 BREAST CANCER SCREENING: ICD-10-CM

## 2018-01-23 PROCEDURE — 77067 SCR MAMMO BI INCL CAD: CPT | Performed by: FAMILY MEDICINE

## 2018-01-25 ENCOUNTER — OFFICE VISIT (OUTPATIENT)
Dept: PHYSICAL THERAPY | Age: 73
End: 2018-01-25
Attending: FAMILY MEDICINE
Payer: MEDICARE

## 2018-01-25 PROCEDURE — 97140 MANUAL THERAPY 1/> REGIONS: CPT | Performed by: OCCUPATIONAL THERAPIST

## 2018-01-25 PROCEDURE — 97110 THERAPEUTIC EXERCISES: CPT | Performed by: OCCUPATIONAL THERAPIST

## 2018-01-25 NOTE — PROGRESS NOTES
Dx: Right CTS  Swelling of left middle finger (M79.89)  Laceration of left middle finger without foreign body without damage to nail, subsequent encounter (H50.113C)           Authorized # of Visits:  Medicare       Next MD visit: none scheduled  Fall Risk Orange putty dowel drag x10 hook and composite     and pinch x10 x10 dowel drag      Orange putty push x10 x10  and pinch     HEP instruction    X reviewed         Orthosis  digi gutter for night time to increase PIP extension 15' Digit sleeve- siz chair with greater ease. 4. Left hand  strength will increase to 40 pounds to facilitate ease with opening jar lids and bottle caps.     5. Right hand Tri pod pinch strength will increase to 15 pounds manipulate jar lids and bottle caps with greater ea

## 2018-01-29 ENCOUNTER — OFFICE VISIT (OUTPATIENT)
Dept: PHYSICAL THERAPY | Age: 73
End: 2018-01-29
Attending: FAMILY MEDICINE
Payer: MEDICARE

## 2018-01-29 PROCEDURE — 97140 MANUAL THERAPY 1/> REGIONS: CPT | Performed by: OCCUPATIONAL THERAPIST

## 2018-01-29 PROCEDURE — 97110 THERAPEUTIC EXERCISES: CPT | Performed by: OCCUPATIONAL THERAPIST

## 2018-01-29 NOTE — PROGRESS NOTES
Dx: Right CTS  Swelling of left middle finger (M79.89)  Laceration of left middle finger without foreign body without damage to nail, subsequent encounter (Z01.071R)           Authorized # of Visits:  Medicare       Next MD visit: none scheduled  Fall Risk composite flexion  x2 Scrub and carry program 2'ea x2    9# in bucket  BTE program L hand   X    Orange putty    and pinch  Bilateral   and dowel drag x10 Orange putty dowel drag x10 hook and composite     and pinch x10 x10 dowel drag      Orange p with opening jar lids and bottle caps. 5. Right hand Tri pod pinch strength will increase to 15 pounds manipulate jar lids and bottle caps with greater ease.                 Kirby MATTA, OTR/L

## 2018-02-01 ENCOUNTER — OFFICE VISIT (OUTPATIENT)
Dept: PHYSICAL THERAPY | Age: 73
End: 2018-02-01
Attending: FAMILY MEDICINE
Payer: MEDICARE

## 2018-02-01 PROCEDURE — 97140 MANUAL THERAPY 1/> REGIONS: CPT | Performed by: OCCUPATIONAL THERAPIST

## 2018-02-01 PROCEDURE — 97110 THERAPEUTIC EXERCISES: CPT | Performed by: OCCUPATIONAL THERAPIST

## 2018-02-01 NOTE — PROGRESS NOTES
Patient Name: Haroldine Dakins  YOB: 1945          MRN number:  [id number]  Date:  2/1/2018  Referring Physician:  Akanksha Neves    Progress Summary    Pt has attended 8,visits for Occupational Therapy    Assessment: Overall improved ROM and bilateral 3 cycles bilateral 3 cycles 3 cycles 3 cycles 3 cycles   LF- PIP/DIP blocking- left x5ea x5ea x5ea x5     Reverse blocking LF  x5 x5    Reverse blocking LF  x5 x5    Reverse blocking LF  x5 x5  Reverse blocking LF x5 x5   Small colored pegs pick (lbs) 25, 25, 25 50, 50, 50   Lateral Pinch strength  (lbs) 14, 14, 14 17, 17, 17   Tripod Pinch strength  (lbs) 8, 8, 8 13, 14, 15   Tip pinch strength  (lbs) 7, 7, 7 12, 13, 12               Edema:  Wrist R/L  14.4/ 14.0                LF PIP  6.1 Strengthening as appropriate, modalities PRN, HEP instruction and orthosis fabrication as needed.          Patient/Family/Caregiver was advised of these findings, precautions, and treatment options and has agreed to actively participate in planning and for

## 2018-02-09 ENCOUNTER — TELEPHONE (OUTPATIENT)
Dept: FAMILY MEDICINE CLINIC | Facility: CLINIC | Age: 73
End: 2018-02-09

## 2018-02-10 NOTE — TELEPHONE ENCOUNTER
Last PT was completed on 02/01/2018    Plan: Continue skilled Occupational therapy 2 x/week for 3-4 weeks.  Treatment will include: A/PROM, Strengthening as appropriate, modalities PRN, HEP instruction and orthosis fabrication as needed.          Patient/Fa

## 2018-02-23 NOTE — TELEPHONE ENCOUNTER
Pharmacy requesting refill for     Montelukast Sodium (SINGULAIR) 10 MG Oral Tab Take 1 tablet (10 mg total) by mouth nightly. Disp: 90 tablet Rfl: 3     Tried to send electronic but failed due to under Dr. Faizan Wade.  Pharmacy needs new refill sent from Dr. Maryanne Craft

## 2018-02-26 RX ORDER — MONTELUKAST SODIUM 10 MG/1
10 TABLET ORAL NIGHTLY
Qty: 90 TABLET | Refills: 0 | Status: SHIPPED | OUTPATIENT
Start: 2018-02-26 | End: 2018-05-25

## 2018-02-26 NOTE — TELEPHONE ENCOUNTER
Refill Protocol Appointment Criteria  · Appointment scheduled in the past 6 months or in the next 3 months  Recent Outpatient Visits            3 weeks ago     [facility] in [address] [place] [address], [address] [place] [address] Visit    4 weeks ago     [facility]

## 2018-03-15 ENCOUNTER — OFFICE VISIT (OUTPATIENT)
Dept: FAMILY MEDICINE CLINIC | Facility: CLINIC | Age: 73
End: 2018-03-15

## 2018-03-15 VITALS
HEART RATE: 66 BPM | DIASTOLIC BLOOD PRESSURE: 81 MMHG | BODY MASS INDEX: 24 KG/M2 | SYSTOLIC BLOOD PRESSURE: 157 MMHG | WEIGHT: 137 LBS | TEMPERATURE: 97 F

## 2018-03-15 DIAGNOSIS — G56.01 CARPAL TUNNEL SYNDROME ON RIGHT: ICD-10-CM

## 2018-03-15 DIAGNOSIS — M79.89 SWELLING OF LEFT HAND: Primary | ICD-10-CM

## 2018-03-15 PROCEDURE — 99212 OFFICE O/P EST SF 10 MIN: CPT | Performed by: FAMILY MEDICINE

## 2018-03-15 PROCEDURE — G0463 HOSPITAL OUTPT CLINIC VISIT: HCPCS | Performed by: FAMILY MEDICINE

## 2018-03-15 NOTE — PROGRESS NOTES
Patient still has swelling in the left hand despite her occupational therapy she has numbness in the first second and third digits of the right hand    Exam  Left hand swelling pip of 2-5 digits  Able to make a fist but she has to really try and it is diff

## 2018-04-20 ENCOUNTER — APPOINTMENT (OUTPATIENT)
Dept: LAB | Age: 73
End: 2018-04-20
Attending: FAMILY MEDICINE
Payer: MEDICARE

## 2018-04-20 DIAGNOSIS — E78.00 HIGH CHOLESTEROL: ICD-10-CM

## 2018-04-20 DIAGNOSIS — E03.9 ACQUIRED HYPOTHYROIDISM: ICD-10-CM

## 2018-04-20 PROCEDURE — 80053 COMPREHEN METABOLIC PANEL: CPT

## 2018-04-20 PROCEDURE — 80061 LIPID PANEL: CPT

## 2018-04-20 PROCEDURE — 84443 ASSAY THYROID STIM HORMONE: CPT

## 2018-04-20 PROCEDURE — 36415 COLL VENOUS BLD VENIPUNCTURE: CPT

## 2018-05-25 RX ORDER — MONTELUKAST SODIUM 10 MG/1
TABLET ORAL
Qty: 90 TABLET | Refills: 0 | Status: SHIPPED | OUTPATIENT
Start: 2018-05-25 | End: 2018-08-24

## 2018-05-25 NOTE — TELEPHONE ENCOUNTER
Refilled per Epic protocol.     Refill Protocol Appointment Criteria Asthma & COPD:     · Appointment scheduled in the past 12 months or in the next 3 months  Recent Outpatient Visits            2 months ago Swelling of left hand    [facility]

## 2018-05-31 ENCOUNTER — OFFICE VISIT (OUTPATIENT)
Dept: FAMILY MEDICINE CLINIC | Facility: CLINIC | Age: 73
End: 2018-05-31

## 2018-05-31 VITALS
DIASTOLIC BLOOD PRESSURE: 72 MMHG | HEIGHT: 63 IN | WEIGHT: 135 LBS | HEART RATE: 64 BPM | SYSTOLIC BLOOD PRESSURE: 138 MMHG | BODY MASS INDEX: 23.92 KG/M2 | TEMPERATURE: 98 F

## 2018-05-31 DIAGNOSIS — G56.03 BILATERAL CARPAL TUNNEL SYNDROME: ICD-10-CM

## 2018-05-31 DIAGNOSIS — Z00.00 ENCOUNTER FOR ANNUAL HEALTH EXAMINATION: ICD-10-CM

## 2018-05-31 DIAGNOSIS — J44.9 ASTHMA WITH COPD (CHRONIC OBSTRUCTIVE PULMONARY DISEASE) (HCC): ICD-10-CM

## 2018-05-31 DIAGNOSIS — I70.0 ATHEROSCLEROSIS OF AORTA (HCC): ICD-10-CM

## 2018-05-31 DIAGNOSIS — R91.1 NODULE OF LEFT LUNG: ICD-10-CM

## 2018-05-31 DIAGNOSIS — E03.9 ACQUIRED HYPOTHYROIDISM: ICD-10-CM

## 2018-05-31 DIAGNOSIS — R01.1 NEWLY RECOGNIZED HEART MURMUR: ICD-10-CM

## 2018-05-31 DIAGNOSIS — K42.9 UMBILICAL HERNIA WITHOUT OBSTRUCTION AND WITHOUT GANGRENE: ICD-10-CM

## 2018-05-31 DIAGNOSIS — H25.13 AGE-RELATED NUCLEAR CATARACT OF BOTH EYES: ICD-10-CM

## 2018-05-31 DIAGNOSIS — K44.9 HIATAL HERNIA: ICD-10-CM

## 2018-05-31 DIAGNOSIS — Z00.00 ENCOUNTER FOR MEDICARE ANNUAL WELLNESS EXAM: Primary | ICD-10-CM

## 2018-05-31 DIAGNOSIS — M85.80 OSTEOPENIA, UNSPECIFIED LOCATION: ICD-10-CM

## 2018-05-31 DIAGNOSIS — E78.2 MIXED HYPERLIPIDEMIA: ICD-10-CM

## 2018-05-31 PROCEDURE — 96160 PT-FOCUSED HLTH RISK ASSMT: CPT | Performed by: FAMILY MEDICINE

## 2018-05-31 PROCEDURE — G0439 PPPS, SUBSEQ VISIT: HCPCS | Performed by: FAMILY MEDICINE

## 2018-05-31 RX ORDER — ROSUVASTATIN CALCIUM 40 MG/1
40 TABLET, COATED ORAL NIGHTLY
Qty: 90 TABLET | Refills: 3 | Status: SHIPPED | OUTPATIENT
Start: 2018-05-31 | End: 2019-05-24

## 2018-05-31 RX ORDER — CHOLECALCIFEROL (VITAMIN D3) 50 MCG
TABLET ORAL
Qty: 90 TABLET | Refills: 3 | Status: SHIPPED | OUTPATIENT
Start: 2018-05-31 | End: 2019-05-24

## 2018-05-31 NOTE — PROGRESS NOTES
Colonoscopy due 2020  HPI:   Wilmer Vazquez is a 72year old female who presents for a Medicare Subsequent Annual Wellness visit (Pt already had Initial Annual Wellness).       Her last annual assessment has been over 1 year: Annual Physical due on 05/16/20 disease) ([facility]    Wt Readings from Last 3 Encounters:  05/31/18 : 135 lb (61.2 kg)  03/15/18 : 137 lb (62.1 kg)  12/18/17 : 137 lb (62.1 kg)     Last Cholesterol Labs:     Lab Results  Component Value Date   CHOLEST 145 04/20/2018   HDL 50 04/20/2018   LDL history includes Heart Disorder in an other family member; Lipids in an other family member. SOCIAL HISTORY:   She  reports that she has never smoked. She has never used smokeless tobacco. She reports that she does not drink alcohol or use drugs.      REV trouble understanding the speech of women and children:  No I have trouble understanding the speaker in a large room such as at a meeting or place of [employer]:  No   Many people I talk to seem to mumble (or don't speak clearly):  No People get annoyed mroteza ASSESSMENT AND OTHER RELEVANT CHRONIC CONDITIONS:   Ravin Dumont is a 72year old female who presents for a Medicare Assessment. PLAN SUMMARY:   1. Encounter for Medicare annual wellness exam  Stable. 2. Atherosclerosis of aorta (HCC)  Stable. 04/20/2018 105 (H)   ----------  GLUCOSE (P) (mg/dL)   Date Value   09/13/2016 109 (H)   ----------       Cardiovascular Disease Screening     LDL Annually LDL Cholesterol (mg/dL)   Date Value   04/20/2018 68   09/13/2016 136 (H)        EKG - w/ Initial institutions for the mentally retarded   Persons who live in the same house as a HepB virus carrier   Homosexual men   Illicit injectable drug abusers     Tetanus Toxoid  Only covered with a cut with metal- TD and TDaP Not covered by Medicare Part B No vac

## 2018-05-31 NOTE — PATIENT INSTRUCTIONS
Mary Deea's SCREENING SCHEDULE   Tests on this list are recommended by your physician but may not be covered, or covered at this frequency, by your insurer. Please check with your insurance carrier before scheduling to verify coverage.    PREVENTATIVE 100 cigarettes in their lifetime   • Anyone with a family history    Colorectal Cancer Screening  Covered up to Age 76     Colonoscopy Screen   Covered every 10 years- more often if abnormal Colonoscopy,7 Years due on 06/01/2023 Update [facility] Maintenance i Pneumococcal 13 (Prevnar)  Covered Once after 65   Orders placed or performed in visit on 04/05/16  -PNEUMOCOCCAL VACC, 13 ISAMAR IM    Please get once after your 65th birthday    Pneumococcal 23 (Pneumovax)  Covered Once after 65   Orders placed or performed

## 2018-06-08 ENCOUNTER — HOSPITAL ENCOUNTER (OUTPATIENT)
Dept: CARDIOLOGY CLINIC | Age: 73
Discharge: HOME | End: 2018-06-08
Attending: FAMILY MEDICINE
Payer: MEDICARE

## 2018-06-08 DIAGNOSIS — R01.1 NEWLY RECOGNIZED HEART MURMUR: ICD-10-CM

## 2018-06-08 PROCEDURE — 93306 TTE W/DOPPLER COMPLETE: CPT | Performed by: FAMILY MEDICINE

## 2018-06-11 ENCOUNTER — TELEPHONE (OUTPATIENT)
Dept: FAMILY MEDICINE CLINIC | Facility: CLINIC | Age: 73
End: 2018-06-11

## 2018-06-11 NOTE — TELEPHONE ENCOUNTER
Patient and daughter Peri Khalil dropped off a medical health statement for patient's vehicle insurance company. Please call daughter when ready to be picked up at [phone].

## 2018-06-11 NOTE — TELEPHONE ENCOUNTER
Patients daughter requesting call back regarding form. Missed call earlier.  Daughter was on hold but disconnected the call

## 2018-06-12 ENCOUNTER — TELEPHONE (OUTPATIENT)
Dept: OTHER | Age: 73
End: 2018-06-12

## 2018-06-12 PROBLEM — I10 ESSENTIAL HYPERTENSION: Status: ACTIVE | Noted: 2018-06-12

## 2018-06-12 RX ORDER — LOSARTAN POTASSIUM 50 MG/1
50 TABLET ORAL DAILY
Qty: 90 TABLET | Refills: 3 | Status: SHIPPED | OUTPATIENT
Start: 2018-06-12 | End: 2018-09-01

## 2018-06-12 NOTE — TELEPHONE ENCOUNTER
Spoke with patient and informed her of the echo results and of the plan of care. Patient voiced understanding and agreed with the plan of care.  Rx sent to pharmacy.                  ----- Message from Valerie Palmer DO sent at 6/9/2018  2:49 PM CDT ---

## 2018-08-24 RX ORDER — MONTELUKAST SODIUM 10 MG/1
TABLET ORAL
Qty: 90 TABLET | Refills: 0 | Status: SHIPPED | OUTPATIENT
Start: 2018-08-24 | End: 2018-11-20

## 2018-08-25 NOTE — TELEPHONE ENCOUNTER
Refill Protocol Appointment Criteria  · Appointment scheduled in the past 6 months or in the next 3 months  Recent Outpatient Visits            2 months ago Encounter for [employer] annual wellness exam    [address]

## 2018-09-01 ENCOUNTER — OFFICE VISIT (OUTPATIENT)
Dept: FAMILY MEDICINE CLINIC | Facility: CLINIC | Age: 73
End: 2018-09-01
Payer: MEDICARE

## 2018-09-01 VITALS
HEART RATE: 73 BPM | BODY MASS INDEX: 24 KG/M2 | WEIGHT: 135 LBS | SYSTOLIC BLOOD PRESSURE: 131 MMHG | DIASTOLIC BLOOD PRESSURE: 73 MMHG

## 2018-09-01 DIAGNOSIS — I70.0 ATHEROSCLEROSIS OF AORTA (HCC): Primary | ICD-10-CM

## 2018-09-01 DIAGNOSIS — E78.2 MIXED HYPERLIPIDEMIA: ICD-10-CM

## 2018-09-01 DIAGNOSIS — I10 ESSENTIAL HYPERTENSION: ICD-10-CM

## 2018-09-01 DIAGNOSIS — E03.9 ACQUIRED HYPOTHYROIDISM: ICD-10-CM

## 2018-09-01 PROCEDURE — 99214 OFFICE O/P EST MOD 30 MIN: CPT | Performed by: FAMILY MEDICINE

## 2018-09-01 RX ORDER — LOSARTAN POTASSIUM 100 MG/1
100 TABLET ORAL DAILY
Qty: 90 TABLET | Refills: 3 | Status: SHIPPED | OUTPATIENT
Start: 2018-09-01 | End: 2019-08-22

## 2018-09-01 NOTE — PROGRESS NOTES
htn  Better control  No pain        Patient's past medical surgical family social history was reviewed. Review of Systems  Allergic: no environmental allergies or food allergies  Cardiovascular: no chest pain, irregular heartbeat, or palpitations.   Cons

## 2018-11-20 RX ORDER — MONTELUKAST SODIUM 10 MG/1
TABLET ORAL
Qty: 90 TABLET | Refills: 0 | Status: SHIPPED | OUTPATIENT
Start: 2018-11-20 | End: 2019-03-19

## 2018-11-21 NOTE — TELEPHONE ENCOUNTER
Refill passed per [facility], [facility] protocol.   Refill Protocol Appointment Criteria  · Appointment scheduled in the past 6 months or in the next 3 months  Recent Outpatient Visits            2 months ago Atherosclerosis of aorta [facility])    [address]

## 2018-11-30 ENCOUNTER — APPOINTMENT (OUTPATIENT)
Dept: LAB | Age: 73
End: 2018-11-30
Attending: FAMILY MEDICINE
Payer: MEDICARE

## 2018-11-30 DIAGNOSIS — E78.2 MIXED HYPERLIPIDEMIA: ICD-10-CM

## 2018-11-30 DIAGNOSIS — E03.9 ACQUIRED HYPOTHYROIDISM: ICD-10-CM

## 2018-11-30 PROCEDURE — 36415 COLL VENOUS BLD VENIPUNCTURE: CPT

## 2018-11-30 PROCEDURE — 80053 COMPREHEN METABOLIC PANEL: CPT

## 2018-11-30 PROCEDURE — 84443 ASSAY THYROID STIM HORMONE: CPT

## 2018-11-30 PROCEDURE — 80061 LIPID PANEL: CPT

## 2018-12-20 ENCOUNTER — TELEPHONE (OUTPATIENT)
Dept: FAMILY MEDICINE CLINIC | Facility: CLINIC | Age: 73
End: 2018-12-20

## 2018-12-20 NOTE — TELEPHONE ENCOUNTER
Omani SPEAKER --  Pt is dropping off wellness forms for her insurance company. Please mail forms to envelop included and please mail a copy to pt.

## 2018-12-31 ENCOUNTER — OFFICE VISIT (OUTPATIENT)
Dept: FAMILY MEDICINE CLINIC | Facility: CLINIC | Age: 73
End: 2018-12-31
Payer: MEDICARE

## 2018-12-31 VITALS
DIASTOLIC BLOOD PRESSURE: 74 MMHG | HEART RATE: 62 BPM | WEIGHT: 136 LBS | BODY MASS INDEX: 24.1 KG/M2 | SYSTOLIC BLOOD PRESSURE: 111 MMHG | HEIGHT: 63 IN

## 2018-12-31 DIAGNOSIS — E55.9 VITAMIN D DEFICIENCY: ICD-10-CM

## 2018-12-31 DIAGNOSIS — E03.9 ACQUIRED HYPOTHYROIDISM: ICD-10-CM

## 2018-12-31 DIAGNOSIS — K21.9 GASTROESOPHAGEAL REFLUX DISEASE WITHOUT ESOPHAGITIS: ICD-10-CM

## 2018-12-31 DIAGNOSIS — M85.80 OSTEOPENIA, UNSPECIFIED LOCATION: ICD-10-CM

## 2018-12-31 DIAGNOSIS — E78.2 MIXED HYPERLIPIDEMIA: ICD-10-CM

## 2018-12-31 DIAGNOSIS — H04.202 EXCESSIVE TEAR PRODUCTION OF LEFT LACRIMAL GLAND: ICD-10-CM

## 2018-12-31 DIAGNOSIS — I70.0 ATHEROSCLEROSIS OF AORTA (HCC): ICD-10-CM

## 2018-12-31 DIAGNOSIS — R91.1 NODULE OF LEFT LUNG: ICD-10-CM

## 2018-12-31 DIAGNOSIS — I10 ESSENTIAL HYPERTENSION: Primary | ICD-10-CM

## 2018-12-31 PROCEDURE — G0463 HOSPITAL OUTPT CLINIC VISIT: HCPCS | Performed by: FAMILY MEDICINE

## 2018-12-31 PROCEDURE — 99214 OFFICE O/P EST MOD 30 MIN: CPT | Performed by: FAMILY MEDICINE

## 2018-12-31 RX ORDER — OMEPRAZOLE 40 MG/1
40 CAPSULE, DELAYED RELEASE ORAL DAILY
Qty: 30 CAPSULE | Refills: 1 | Status: SHIPPED | OUTPATIENT
Start: 2018-12-31 | End: 2019-09-09

## 2018-12-31 NOTE — PROGRESS NOTES
\"I feel good. \"    I have a lot of tearing in my left eye since my cataract surgery. Patient's past medical surgical family social history was reviewed.     Review of Systems  Allergic: no environmental allergies or food allergies  Cardiovascular: no ch PLATELET; Future    4. Acquired hypothyroidism  Recheck 5 mo  - ASSAY, THYROID STIM HORMONE; Future    5. Atherosclerosis of aorta (HCC)  Stable. 6. Osteopenia, unspecified location  Recheck vit d    7.  Excessive tear production of left lacrimal gland

## 2019-01-02 ENCOUNTER — TELEPHONE (OUTPATIENT)
Dept: OTHER | Age: 74
End: 2019-01-02

## 2019-01-02 NOTE — TELEPHONE ENCOUNTER
Patient calling back and states that Jake Strong called her on 12/31/18,. Advised that she called her due to the form that was completed but it was already given to her during [address] [place] [address] 12/31/18, verbalized understanding.

## 2019-02-14 ENCOUNTER — PATIENT OUTREACH (OUTPATIENT)
Dept: CASE MANAGEMENT | Age: 74
End: 2019-02-14

## 2019-02-14 NOTE — PROGRESS NOTES
Outreached to patient in regards to scheduling Medicare Annual exam (AHA). Left message for patient to return my call at ext. [phone]. Patient is eligible in May. Thank you.

## 2019-02-15 NOTE — PROGRESS NOTES
Patient returned my call in regards to her Medicare Annual Exam [facility]) and scheduled her appt wit her PCP for 04/18/2019. Thank you.

## 2019-02-22 RX ORDER — LEVOTHYROXINE SODIUM 0.07 MG/1
TABLET ORAL
Qty: 90 TABLET | Refills: 0 | Status: SHIPPED | OUTPATIENT
Start: 2019-02-22 | End: 2019-05-24

## 2019-02-23 NOTE — TELEPHONE ENCOUNTER
Refill passed per [facility] protocol.   Hypothyroid Medications  Protocol Criteria:  Appointment scheduled in the past 12 months or the next 3 months  TSH resulted in the past 12 months that is normal  Recent Outpatient Visits            1 month ago Essential hypertension    Rodrigo Pimentel, DO    Office Visit    5 months ago Atherosclerosis of aorta [facility])    [facility] [employer] [place], [facility], [address], [facility], [place]    Office Visit    8 months ago Encounter for [employer] annual wellness exam    Rodrigo Pimentel DO    Office Visit    11 months ago Swelling of left hand    Rodrigo Pimentel DO    Office Visit    1 year ago     [facility] in [address] [place] [address] Visit        Future Appointments       Provider Department Appt Notes    In 1 month [address] [facility] Medicare Annual Exam [facility])        Lab Results   Component Value Date    TSH 0.95 11/30/2018

## 2019-03-19 RX ORDER — MONTELUKAST SODIUM 10 MG/1
TABLET ORAL
Qty: 90 TABLET | Refills: 0 | Status: SHIPPED | OUTPATIENT
Start: 2019-03-19 | End: 2019-06-16

## 2019-03-20 NOTE — TELEPHONE ENCOUNTER
Refill passed per [facility] protocol.   Refill Protocol Appointment Criteria  · Appointment scheduled in the past 6 months or in the next 3 months  Recent Outpatient Visits            2 months ago Essential hypertension    [address]

## 2019-04-15 ENCOUNTER — MA CHART PREP (OUTPATIENT)
Dept: FAMILY MEDICINE CLINIC | Facility: CLINIC | Age: 74
End: 2019-04-15

## 2019-04-29 ENCOUNTER — OFFICE VISIT (OUTPATIENT)
Dept: FAMILY MEDICINE CLINIC | Facility: CLINIC | Age: 74
End: 2019-04-29
Payer: MEDICARE

## 2019-04-29 VITALS
HEIGHT: 63 IN | DIASTOLIC BLOOD PRESSURE: 74 MMHG | TEMPERATURE: 98 F | SYSTOLIC BLOOD PRESSURE: 145 MMHG | BODY MASS INDEX: 23.92 KG/M2 | WEIGHT: 135 LBS | HEART RATE: 70 BPM

## 2019-04-29 DIAGNOSIS — R91.1 NODULE OF LEFT LUNG: ICD-10-CM

## 2019-04-29 DIAGNOSIS — I10 ESSENTIAL HYPERTENSION: ICD-10-CM

## 2019-04-29 DIAGNOSIS — M85.80 OSTEOPENIA, UNSPECIFIED LOCATION: ICD-10-CM

## 2019-04-29 DIAGNOSIS — Z00.00 ENCOUNTER FOR ANNUAL HEALTH EXAMINATION: ICD-10-CM

## 2019-04-29 DIAGNOSIS — E03.9 ACQUIRED HYPOTHYROIDISM: ICD-10-CM

## 2019-04-29 DIAGNOSIS — J44.9 ASTHMA WITH COPD (CHRONIC OBSTRUCTIVE PULMONARY DISEASE) (HCC): ICD-10-CM

## 2019-04-29 DIAGNOSIS — Z12.31 VISIT FOR SCREENING MAMMOGRAM: ICD-10-CM

## 2019-04-29 DIAGNOSIS — Z00.00 MEDICARE ANNUAL WELLNESS VISIT, SUBSEQUENT: Primary | ICD-10-CM

## 2019-04-29 DIAGNOSIS — K42.9 UMBILICAL HERNIA WITHOUT OBSTRUCTION AND WITHOUT GANGRENE: ICD-10-CM

## 2019-04-29 DIAGNOSIS — I70.0 ATHEROSCLEROSIS OF AORTA (HCC): ICD-10-CM

## 2019-04-29 DIAGNOSIS — E78.2 MIXED HYPERLIPIDEMIA: ICD-10-CM

## 2019-04-29 PROCEDURE — G0439 PPPS, SUBSEQ VISIT: HCPCS | Performed by: FAMILY MEDICINE

## 2019-04-29 PROCEDURE — 96160 PT-FOCUSED HLTH RISK ASSMT: CPT | Performed by: FAMILY MEDICINE

## 2019-04-29 PROCEDURE — 99397 PER PM REEVAL EST PAT 65+ YR: CPT | Performed by: FAMILY MEDICINE

## 2019-04-29 NOTE — PATIENT INSTRUCTIONS
Leslie at the [address]   Tests on this list are recommended by your physician but may not be covered, or covered at this frequency, by your insurer.  Please check with your insurance carrier before scheduling to verify c than 100 cigarettes in their lifetime   • Anyone with a family history    Colorectal Cancer Screening  Covered up to Age 76     Colonoscopy Screen   Covered every 10 years- more often if abnormal Colonoscopy due on 06/01/2023 Update [facility] if a Pneumococcal 13 (Prevnar)  Covered Once after 65 Orders placed or performed in visit on 04/05/16   • PNEUMOCOCCAL VACC, 13 ISAMAR IM    Please get once after your 65th birthday    Pneumococcal 23 (Pneumovax)  Covered Once after 65 Orders placed or performed i

## 2019-04-29 NOTE — PROGRESS NOTES
HPI:   Ulysses Kawasaki is a 69year old female who presents for a Medicare Subsequent Annual Wellness visit (Pt already had Initial Annual Wellness). I feel good.   Annual Physical due on 05/31/2019        Fall/Risk Assessment   She has been screened for Fa Lab Results   Component Value Date    AST 30 11/30/2018    ALT 24 11/30/2018    CA 9.2 11/30/2018    ALB 3.9 11/30/2018    TSH 0.95 11/30/2018    CREATSERUM 0.69 11/30/2018     (H) 11/30/2018        CBC  (most recent labs)   Lab Results   Componen smoked. She has never used smokeless tobacco. She reports that she does not drink alcohol or use drugs.      REVIEW OF SYSTEMS:   GENERAL: feels well otherwise  SKIN: denies any unusual skin lesions  EYES: denies blurred vision or double vision  HEENT: lópez Moravian:  No   Many people I talk to seem to mumble (or don't speak clearly):  No People get annoyed because I misunderstand what they say:  No   I misunderstand what others are saying and make inappropriate responses:  No I avoid social activities because presents for a Medicare Assessment. PLAN SUMMARY:   1. Medicare annual wellness visit, subsequent  stalb    2. Asthma with COPD (chronic obstructive pulmonary disease) ([facility] [address]  2stable    3. Atherosclerosis of aorta (HCC)  Chol meds    4.  Osteopenia, uns medically necessary Electrocardiogram date10/21/2017       Colorectal Cancer Screening      Colonoscopy Screen every 10 years Colonoscopy due on 06/01/2023 Update Health Maintenance if applicable    Flex Sigmoidoscopy Screen every 10 years No results found with your prescription benefits, but Medicare does not cover unless Medically needed    Zoster  Not covered by Medicare Part B No vaccine history found This may be covered with your pharmacy  prescription benefits      [facility] Internal

## 2019-05-24 DIAGNOSIS — E78.2 MIXED HYPERLIPIDEMIA: ICD-10-CM

## 2019-05-24 RX ORDER — LEVOTHYROXINE SODIUM 0.07 MG/1
TABLET ORAL
Qty: 90 TABLET | Refills: 1 | Status: SHIPPED | OUTPATIENT
Start: 2019-05-24 | End: 2019-11-16

## 2019-05-24 RX ORDER — ROSUVASTATIN CALCIUM 40 MG/1
TABLET, COATED ORAL
Qty: 90 TABLET | Refills: 1 | Status: SHIPPED | OUTPATIENT
Start: 2019-05-24 | End: 2019-11-18

## 2019-05-25 RX ORDER — CHOLECALCIFEROL (VITAMIN D3) 50 MCG
TABLET ORAL
Qty: 90 TABLET | Refills: 11 | Status: SHIPPED | OUTPATIENT
Start: 2019-05-25 | End: 2020-03-11

## 2019-05-25 NOTE — TELEPHONE ENCOUNTER
Refill passed per [facility], [facility] protocol.   Hypothyroid Medications  Protocol Criteria:  Appointment scheduled in the past 12 months or the next 3 months  TSH resulted in the past 12 months that is normal  Recent Outpatient Visits            3 weeks ago M ALT 24 11/30/2018

## 2019-05-25 NOTE — TELEPHONE ENCOUNTER
Review pended refill request as it does not fall under a protocol.     Requested Prescriptions     Pending Prescriptions Disp Refills   • Cholecalciferol (VITAMIN D) 2000 units Oral Tab [Pharmacy Med Name: Vitamin D 2,000 Unit Tab Maria C] 90 tablet 2     Sig:

## 2019-06-10 ENCOUNTER — LAB ENCOUNTER (OUTPATIENT)
Dept: LAB | Age: 74
End: 2019-06-10
Attending: FAMILY MEDICINE
Payer: MEDICARE

## 2019-06-10 DIAGNOSIS — E78.2 MIXED HYPERLIPIDEMIA: ICD-10-CM

## 2019-06-10 DIAGNOSIS — I10 ESSENTIAL HYPERTENSION: ICD-10-CM

## 2019-06-10 DIAGNOSIS — E03.9 ACQUIRED HYPOTHYROIDISM: ICD-10-CM

## 2019-06-10 DIAGNOSIS — E55.9 VITAMIN D DEFICIENCY: ICD-10-CM

## 2019-06-10 PROCEDURE — 84443 ASSAY THYROID STIM HORMONE: CPT

## 2019-06-10 PROCEDURE — 80053 COMPREHEN METABOLIC PANEL: CPT

## 2019-06-10 PROCEDURE — 85025 COMPLETE CBC W/AUTO DIFF WBC: CPT

## 2019-06-10 PROCEDURE — 82306 VITAMIN D 25 HYDROXY: CPT

## 2019-06-10 PROCEDURE — 36415 COLL VENOUS BLD VENIPUNCTURE: CPT

## 2019-06-10 PROCEDURE — 80061 LIPID PANEL: CPT

## 2019-06-17 ENCOUNTER — HOSPITAL ENCOUNTER (OUTPATIENT)
Dept: MAMMOGRAPHY | Age: 74
Discharge: HOME | End: 2019-06-17
Attending: FAMILY MEDICINE
Payer: MEDICARE

## 2019-06-17 DIAGNOSIS — Z12.31 VISIT FOR SCREENING MAMMOGRAM: ICD-10-CM

## 2019-06-17 PROCEDURE — 77063 BREAST TOMOSYNTHESIS BI: CPT | Performed by: FAMILY MEDICINE

## 2019-06-17 PROCEDURE — 77067 SCR MAMMO BI INCL CAD: CPT | Performed by: FAMILY MEDICINE

## 2019-06-17 RX ORDER — MONTELUKAST SODIUM 10 MG/1
TABLET ORAL
Qty: 90 TABLET | Refills: 1 | Status: SHIPPED | OUTPATIENT
Start: 2019-06-17 | End: 2019-12-09

## 2019-06-17 NOTE — TELEPHONE ENCOUNTER
Refill passed per [address] protocol.     Refill Protocol Appointment Criteria  · Appointment scheduled in the past 6 months or in the next 3 months  Recent Outpatient Visits            1 month ago Medicare annual wellness visit, [facility]

## 2019-07-05 ENCOUNTER — TELEPHONE (OUTPATIENT)
Dept: FAMILY MEDICINE CLINIC | Facility: CLINIC | Age: 74
End: 2019-07-05

## 2019-07-08 NOTE — TELEPHONE ENCOUNTER
Patient informed [facility] is out of the office until 07/11/2019. Will wait to have form completed. Form placed in [facility] inbox.

## 2019-07-17 ENCOUNTER — NURSE TRIAGE (OUTPATIENT)
Dept: FAMILY MEDICINE CLINIC | Facility: CLINIC | Age: 74
End: 2019-07-17

## 2019-07-17 NOTE — TELEPHONE ENCOUNTER
Action Requested: Summary for Provider     []  Critical Lab, Recommendations Needed  [] Need Additional Advice  [x]   FYI    []   Need Orders  [] Need Medications Sent to Pharmacy  []  Other     SUMMARY: Per protocol advised : OV within 3 days.  Scheduled w

## 2019-07-19 ENCOUNTER — HOSPITAL ENCOUNTER (OUTPATIENT)
Dept: GENERAL RADIOLOGY | Age: 74
Discharge: HOME | End: 2019-07-19
Attending: FAMILY MEDICINE
Payer: MEDICARE

## 2019-07-19 ENCOUNTER — OFFICE VISIT (OUTPATIENT)
Dept: FAMILY MEDICINE CLINIC | Facility: CLINIC | Age: 74
End: 2019-07-19
Payer: MEDICARE

## 2019-07-19 VITALS
DIASTOLIC BLOOD PRESSURE: 51 MMHG | HEART RATE: 74 BPM | WEIGHT: 132 LBS | BODY MASS INDEX: 23.39 KG/M2 | HEIGHT: 63 IN | SYSTOLIC BLOOD PRESSURE: 94 MMHG

## 2019-07-19 DIAGNOSIS — R07.81 RIB PAIN: ICD-10-CM

## 2019-07-19 DIAGNOSIS — R07.89 OTHER CHEST PAIN: ICD-10-CM

## 2019-07-19 DIAGNOSIS — R07.81 RIB PAIN: Primary | ICD-10-CM

## 2019-07-19 PROCEDURE — 71110 X-RAY EXAM RIBS BIL 3 VIEWS: CPT | Performed by: FAMILY MEDICINE

## 2019-07-19 PROCEDURE — 71046 X-RAY EXAM CHEST 2 VIEWS: CPT | Performed by: FAMILY MEDICINE

## 2019-07-19 PROCEDURE — 99214 OFFICE O/P EST MOD 30 MIN: CPT | Performed by: FAMILY MEDICINE

## 2019-07-19 NOTE — PROGRESS NOTES
Blood pressure 94/51, pulse 74, height 5' 3\" (1.6 m), weight 132 lb (59.9 kg), not currently breastfeeding. Patient presents today following up complaining of pain in the lateral areas of her back.   She reports she was twisting 5 days ago and felt a cr

## 2019-08-20 ENCOUNTER — HOSPITAL ENCOUNTER (OUTPATIENT)
Dept: CV DIAGNOSTICS | Facility: HOSPITAL | Age: 74
Discharge: HOME | End: 2019-08-20
Attending: FAMILY MEDICINE
Payer: MEDICARE

## 2019-08-20 DIAGNOSIS — R07.89 OTHER CHEST PAIN: ICD-10-CM

## 2019-08-20 PROCEDURE — 93016 CV STRESS TEST SUPVJ ONLY: CPT | Performed by: FAMILY MEDICINE

## 2019-08-20 PROCEDURE — 93017 CV STRESS TEST TRACING ONLY: CPT | Performed by: FAMILY MEDICINE

## 2019-08-20 PROCEDURE — 93350 STRESS TTE ONLY: CPT | Performed by: FAMILY MEDICINE

## 2019-08-20 PROCEDURE — 93018 CV STRESS TEST I&R ONLY: CPT | Performed by: FAMILY MEDICINE

## 2019-08-22 ENCOUNTER — NURSE TRIAGE (OUTPATIENT)
Dept: OTHER | Age: 74
End: 2019-08-22

## 2019-08-22 RX ORDER — LOSARTAN POTASSIUM 100 MG/1
TABLET ORAL
Qty: 90 TABLET | Refills: 1 | Status: SHIPPED | OUTPATIENT
Start: 2019-08-22 | End: 2019-09-09

## 2019-08-22 NOTE — TELEPHONE ENCOUNTER
Refill passed per [facility], [facility] protocol.     Hypertensive Medications  Protocol Criteria:  · Appointment scheduled in the past 6 months or in the next 3 months  · BMP or CMP in the past 12 months  · Creatinine result < 2  Recent Outpatient Visits

## 2019-08-22 NOTE — TELEPHONE ENCOUNTER
Advised patient of Dr. Italo Mcdermott note. Patient verbalized understanding  Patient instructed to call office back if rash does not improve.

## 2019-08-22 NOTE — TELEPHONE ENCOUNTER
Action Requested: Summary for Provider     []  Critical Lab, Recommendations Needed  [] Need Additional Advice  [x]   FYI    []   Need Orders  [] Need Medications Sent to Pharmacy  []  Other     SUMMARY: Patient had a stress echo test done yesterday and th

## 2019-08-24 ENCOUNTER — OFFICE VISIT (OUTPATIENT)
Dept: FAMILY MEDICINE CLINIC | Facility: CLINIC | Age: 74
End: 2019-08-24
Payer: MEDICARE

## 2019-08-24 VITALS
TEMPERATURE: 98 F | HEIGHT: 63 IN | DIASTOLIC BLOOD PRESSURE: 66 MMHG | BODY MASS INDEX: 23.42 KG/M2 | SYSTOLIC BLOOD PRESSURE: 126 MMHG | HEART RATE: 74 BPM | WEIGHT: 132.19 LBS

## 2019-08-24 DIAGNOSIS — L23.1 ALLERGIC CONTACT DERMATITIS DUE TO ADHESIVES: Primary | ICD-10-CM

## 2019-08-24 PROCEDURE — 96372 THER/PROPH/DIAG INJ SC/IM: CPT | Performed by: NURSE PRACTITIONER

## 2019-08-24 PROCEDURE — 99213 OFFICE O/P EST LOW 20 MIN: CPT | Performed by: NURSE PRACTITIONER

## 2019-08-24 RX ORDER — METHYLPREDNISOLONE ACETATE 40 MG/ML
40 INJECTION, SUSPENSION INTRA-ARTICULAR; INTRALESIONAL; INTRAMUSCULAR; SOFT TISSUE ONCE
Status: COMPLETED | OUTPATIENT
Start: 2019-08-24 | End: 2019-08-24

## 2019-08-24 RX ORDER — TRIAMCINOLONE ACETONIDE 5 MG/G
1 OINTMENT TOPICAL 2 TIMES DAILY
Qty: 15 G | Refills: 1 | Status: SHIPPED | OUTPATIENT
Start: 2019-08-24 | End: 2019-09-09 | Stop reason: ALTCHOICE

## 2019-08-24 RX ORDER — HYDROXYZINE HYDROCHLORIDE 25 MG/1
25 TABLET, FILM COATED ORAL EVERY 8 HOURS PRN
Qty: 30 TABLET | Refills: 0 | Status: SHIPPED | OUTPATIENT
Start: 2019-08-24 | End: 2019-09-09 | Stop reason: ALTCHOICE

## 2019-08-24 RX ORDER — PREDNISONE 20 MG/1
TABLET ORAL
Qty: 10 TABLET | Refills: 0 | Status: SHIPPED | OUTPATIENT
Start: 2019-08-24 | End: 2019-09-09 | Stop reason: ALTCHOICE

## 2019-08-24 RX ADMIN — METHYLPREDNISOLONE ACETATE 40 MG: 40 INJECTION, SUSPENSION INTRA-ARTICULAR; INTRALESIONAL; INTRAMUSCULAR; SOFT TISSUE at 12:41:00

## 2019-08-24 NOTE — PROGRESS NOTES
HPI    Patient presents for rash to torso. Had a stress test on 8/20 and now has redness to all the sites that the leads were on. Had some triamcinolone cream on 8/22 which she has been using but doesn't help. Very itchy and burning.   Denies shortness o Not on file    Occupational History      Not on file    Social Needs      Financial resource strain: Not on file      Food insecurity:        Worry: Not on file        Inability: Not on file      Transportation needs:        Medical: Not on file        Non on file        Current Outpatient Medications:  triamcinolone acetonide 0.5 % External Ointment Apply 1 Application topically 2 (two) times daily.  Disp: 15 g Rfl: 1   predniSONE 20 MG Oral Tab Take 2 tablets once a day for 5 days Disp: 10 tablet Rfl: 0   h breath sounds normal. No respiratory distress. She has no wheezes. She has no rales. Neurological: She is alert and oriented to person, place, and time. Skin: Skin is warm and dry. Rash noted.  Rash is vesicular and urticarial.   Psychiatric: She has a

## 2019-08-24 NOTE — TELEPHONE ENCOUNTER
Spoke with the patient who reports she has followed Dr. Gilma Mendes' orders and the rash has not resolved. Appointment was scheduled for today 8/24/19. Advised patient to go to the ER if her symptoms get worse. Patient voiced understanding.

## 2019-08-24 NOTE — PATIENT INSTRUCTIONS
Contact Dermatitis  Contact dermatitis is a skin rash caused by something that touches the skin and makes it irritated and inflamed. Your skin may be red, swollen, dry, and may be cracked. Blisters may form and ooze. The rash will itch.   Contact dermatit · You can also try wet dressings. One way to do this is to wear a wet piece of clothing under a dry one. Wear a damp shirt under a dry shirt if your upper body is affected. This can relieve itching and prevent you from scratching the affected area.   · You © 4361-6298 [employer] [address]. [address] [facility], [facility] [place]. All rights reserved. This information is not intended as a substitute for professional medical care.  Always follow your healthcare professional's instructions.      -idalia

## 2019-08-26 ENCOUNTER — TELEPHONE (OUTPATIENT)
Dept: FAMILY MEDICINE CLINIC | Facility: CLINIC | Age: 74
End: 2019-08-26

## 2019-09-07 ENCOUNTER — NURSE TRIAGE (OUTPATIENT)
Dept: FAMILY MEDICINE CLINIC | Facility: CLINIC | Age: 74
End: 2019-09-07

## 2019-09-07 NOTE — TELEPHONE ENCOUNTER
Pt called stating she is having back pain and has been seeing the Dr for this issue, however she is now having bilateral arm weakness.

## 2019-09-09 ENCOUNTER — OFFICE VISIT (OUTPATIENT)
Dept: FAMILY MEDICINE CLINIC | Facility: CLINIC | Age: 74
End: 2019-09-09
Payer: MEDICARE

## 2019-09-09 ENCOUNTER — HOSPITAL ENCOUNTER (OUTPATIENT)
Dept: GENERAL RADIOLOGY | Age: 74
Discharge: HOME | End: 2019-09-09
Attending: FAMILY MEDICINE
Payer: MEDICARE

## 2019-09-09 VITALS
WEIGHT: 133 LBS | DIASTOLIC BLOOD PRESSURE: 62 MMHG | BODY MASS INDEX: 24 KG/M2 | SYSTOLIC BLOOD PRESSURE: 146 MMHG | HEART RATE: 73 BPM | TEMPERATURE: 98 F

## 2019-09-09 DIAGNOSIS — R53.83 FATIGUE, UNSPECIFIED TYPE: ICD-10-CM

## 2019-09-09 DIAGNOSIS — R42 POSTURAL DIZZINESS WITH PRESYNCOPE: ICD-10-CM

## 2019-09-09 DIAGNOSIS — R94.2 DECREASED FUNCTIONAL RESIDUAL CAPACITY: ICD-10-CM

## 2019-09-09 DIAGNOSIS — R55 POSTURAL DIZZINESS WITH PRESYNCOPE: Primary | ICD-10-CM

## 2019-09-09 DIAGNOSIS — R29.898 ARM WEAKNESS: ICD-10-CM

## 2019-09-09 DIAGNOSIS — R42 POSTURAL DIZZINESS WITH PRESYNCOPE: Primary | ICD-10-CM

## 2019-09-09 DIAGNOSIS — R55 POSTURAL DIZZINESS WITH PRESYNCOPE: ICD-10-CM

## 2019-09-09 PROCEDURE — 72050 X-RAY EXAM NECK SPINE 4/5VWS: CPT | Performed by: FAMILY MEDICINE

## 2019-09-09 PROCEDURE — 99215 OFFICE O/P EST HI 40 MIN: CPT | Performed by: FAMILY MEDICINE

## 2019-09-09 RX ORDER — TRIAMTERENE AND HYDROCHLOROTHIAZIDE 37.5; 25 MG/1; MG/1
1 CAPSULE ORAL EVERY MORNING
Qty: 30 CAPSULE | Refills: 1 | Status: ON HOLD | OUTPATIENT
Start: 2019-09-09 | End: 2019-09-20

## 2019-09-09 NOTE — PROGRESS NOTES
1 week  Has heavy arms  Goes to the lower back    No more gerd symptoms. Very tired. Dizziness with losartan    \"Feels like I'm gonna faint. \"  Feels like its right now. \"I'm very tired. \"  When she walks she noticed she goes more to the left and normal  Lungs were clear bilaterally there was no wheezes rhonchus or rales  Abdomen was soft non tender non distended bowel sounds were present  Heart was regular rate and rhythm normal S1-S2 no S3 no S4 there were no murmurs appreciated  Lymphatic: There

## 2019-09-10 ENCOUNTER — LAB ENCOUNTER (OUTPATIENT)
Dept: LAB | Age: 74
End: 2019-09-10
Attending: FAMILY MEDICINE
Payer: MEDICARE

## 2019-09-10 ENCOUNTER — TELEPHONE (OUTPATIENT)
Dept: FAMILY MEDICINE CLINIC | Facility: CLINIC | Age: 74
End: 2019-09-10

## 2019-09-10 DIAGNOSIS — R53.83 FATIGUE, UNSPECIFIED TYPE: ICD-10-CM

## 2019-09-10 DIAGNOSIS — R94.2 DECREASED FUNCTIONAL RESIDUAL CAPACITY: ICD-10-CM

## 2019-09-10 DIAGNOSIS — R55 POSTURAL DIZZINESS WITH PRESYNCOPE: ICD-10-CM

## 2019-09-10 DIAGNOSIS — R29.898 ARM WEAKNESS: ICD-10-CM

## 2019-09-10 DIAGNOSIS — R42 POSTURAL DIZZINESS WITH PRESYNCOPE: ICD-10-CM

## 2019-09-10 LAB
ALBUMIN SERPL-MCNC: 3.6 G/DL (ref 3.4–5)
ALBUMIN/GLOB SERPL: 0.9 {RATIO} (ref 1–2)
ALP LIVER SERPL-CCNC: 60 U/L (ref 55–142)
ALT SERPL-CCNC: 25 U/L (ref 13–56)
ANION GAP SERPL CALC-SCNC: 6 MMOL/L (ref 0–18)
AST SERPL-CCNC: 23 U/L (ref 15–37)
BASOPHILS # BLD AUTO: 0.03 X10(3) UL (ref 0–0.2)
BASOPHILS NFR BLD AUTO: 0.5 %
BILIRUB SERPL-MCNC: 0.4 MG/DL (ref 0.1–2)
BUN BLD-MCNC: 16 MG/DL (ref 7–18)
BUN/CREAT SERPL: 19.8 (ref 10–20)
CALCIUM BLD-MCNC: 9.2 MG/DL (ref 8.5–10.1)
CHLORIDE SERPL-SCNC: 108 MMOL/L (ref 98–112)
CHOLEST SMN-MCNC: 131 MG/DL (ref ?–200)
CO2 SERPL-SCNC: 30 MMOL/L (ref 21–32)
CREAT BLD-MCNC: 0.81 MG/DL (ref 0.55–1.02)
DEPRECATED RDW RBC AUTO: 41.6 FL (ref 35.1–46.3)
EOSINOPHIL # BLD AUTO: 0.09 X10(3) UL (ref 0–0.7)
EOSINOPHIL NFR BLD AUTO: 1.5 %
ERYTHROCYTE [DISTWIDTH] IN BLOOD BY AUTOMATED COUNT: 11.9 % (ref 11–15)
GLOBULIN PLAS-MCNC: 3.8 G/DL (ref 2.8–4.4)
GLUCOSE BLD-MCNC: 91 MG/DL (ref 70–99)
HCT VFR BLD AUTO: 40.8 % (ref 35–48)
HDLC SERPL-MCNC: 59 MG/DL (ref 40–59)
HGB BLD-MCNC: 13.6 G/DL (ref 12–16)
IMM GRANULOCYTES # BLD AUTO: 0.01 X10(3) UL (ref 0–1)
IMM GRANULOCYTES NFR BLD: 0.2 %
LDLC SERPL CALC-MCNC: 56 MG/DL (ref ?–100)
LYMPHOCYTES # BLD AUTO: 1.67 X10(3) UL (ref 1–4)
LYMPHOCYTES NFR BLD AUTO: 28.6 %
M PROTEIN MFR SERPL ELPH: 7.4 G/DL (ref 6.4–8.2)
MCH RBC QN AUTO: 31.9 PG (ref 26–34)
MCHC RBC AUTO-ENTMCNC: 33.3 G/DL (ref 31–37)
MCV RBC AUTO: 95.8 FL (ref 80–100)
MONOCYTES # BLD AUTO: 0.67 X10(3) UL (ref 0.1–1)
MONOCYTES NFR BLD AUTO: 11.5 %
NEUTROPHILS # BLD AUTO: 3.36 X10 (3) UL (ref 1.5–7.7)
NEUTROPHILS # BLD AUTO: 3.36 X10(3) UL (ref 1.5–7.7)
NEUTROPHILS NFR BLD AUTO: 57.7 %
NONHDLC SERPL-MCNC: 72 MG/DL (ref ?–130)
OSMOLALITY SERPL CALC.SUM OF ELEC: 299 MOSM/KG (ref 275–295)
PATIENT FASTING: YES
PATIENT FASTING: YES
PLATELET # BLD AUTO: 213 10(3)UL (ref 150–450)
POTASSIUM SERPL-SCNC: 4.2 MMOL/L (ref 3.5–5.1)
RBC # BLD AUTO: 4.26 X10(6)UL (ref 3.8–5.3)
SODIUM SERPL-SCNC: 144 MMOL/L (ref 136–145)
TRIGL SERPL-MCNC: 79 MG/DL (ref 30–149)
TSI SER-ACNC: 0.88 MIU/ML (ref 0.36–3.74)
VIT B12 SERPL-MCNC: 351 PG/ML (ref 193–986)
VLDLC SERPL CALC-MCNC: 16 MG/DL (ref 0–30)
WBC # BLD AUTO: 5.8 X10(3) UL (ref 4–11)

## 2019-09-10 PROCEDURE — 82306 VITAMIN D 25 HYDROXY: CPT

## 2019-09-10 PROCEDURE — 85025 COMPLETE CBC W/AUTO DIFF WBC: CPT

## 2019-09-10 PROCEDURE — 84443 ASSAY THYROID STIM HORMONE: CPT

## 2019-09-10 PROCEDURE — 80053 COMPREHEN METABOLIC PANEL: CPT

## 2019-09-10 PROCEDURE — 82607 VITAMIN B-12: CPT

## 2019-09-10 PROCEDURE — 36415 COLL VENOUS BLD VENIPUNCTURE: CPT

## 2019-09-10 PROCEDURE — 80061 LIPID PANEL: CPT

## 2019-09-10 NOTE — TELEPHONE ENCOUNTER
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 Some pinching of the nerves as well. This may cause some of the weakness she is feeling in the arms.   Referrals as discussed plan as discussed

## 2019-09-10 NOTE — TELEPHONE ENCOUNTER
Spoke to patient and informed of message below.   Pt verbalized understanding, states has appt set up already with both specialist.

## 2019-09-11 ENCOUNTER — TELEPHONE (OUTPATIENT)
Dept: FAMILY MEDICINE CLINIC | Facility: CLINIC | Age: 74
End: 2019-09-11

## 2019-09-11 LAB — 25(OH)D3 SERPL-MCNC: 36.9 NG/ML (ref 30–100)

## 2019-09-11 NOTE — TELEPHONE ENCOUNTER
----- Message from Alan Banks RN sent at [phone]  4:55 PM CDT -----  Clinical staff, please call with normal results, thanks

## 2019-09-13 ENCOUNTER — TELEPHONE (OUTPATIENT)
Dept: FAMILY MEDICINE CLINIC | Facility: CLINIC | Age: 74
End: 2019-09-13

## 2019-09-16 ENCOUNTER — OFFICE VISIT (OUTPATIENT)
Dept: FAMILY MEDICINE CLINIC | Facility: CLINIC | Age: 74
End: 2019-09-16
Payer: MEDICARE

## 2019-09-16 VITALS
HEART RATE: 87 BPM | DIASTOLIC BLOOD PRESSURE: 63 MMHG | SYSTOLIC BLOOD PRESSURE: 101 MMHG | BODY MASS INDEX: 22.62 KG/M2 | HEIGHT: 63 IN | TEMPERATURE: 98 F | WEIGHT: 127.69 LBS | RESPIRATION RATE: 16 BRPM

## 2019-09-16 DIAGNOSIS — R42 DIZZINESS: Primary | ICD-10-CM

## 2019-09-16 DIAGNOSIS — F32.9 REACTIVE DEPRESSION: ICD-10-CM

## 2019-09-16 PROBLEM — F32.A DEPRESSION: Status: ACTIVE | Noted: 2019-09-16

## 2019-09-16 PROCEDURE — 99213 OFFICE O/P EST LOW 20 MIN: CPT | Performed by: PHYSICIAN ASSISTANT

## 2019-09-16 RX ORDER — CITALOPRAM 10 MG/1
10 TABLET ORAL DAILY
Qty: 30 TABLET | Refills: 3 | Status: SHIPPED | OUTPATIENT
Start: 2019-09-16 | End: 2019-09-26

## 2019-09-16 NOTE — PROGRESS NOTES
HPI:     Dizziness   This is a new problem. The current episode started in the past 7 days. The problem occurs intermittently. The problem has been unchanged. Associated symptoms include fatigue.  Pertinent negatives include no abdominal pain, change in bow 06/17/2020  Colonoscopy due on 06/01/2023  DEXA Scan Completed  Pneumococcal PPSV23/PCV13 65+ Years / Low and Medium Risk Completed    No LMP recorded.  Patient is postmenopausal.   Past Medical History:     Past Medical History:   Diagnosis Date   • Age-re Relationships      Social connections:        Talks on phone: Not on file        Gets together: Not on file        Attends Mosque service: Not on file        Active member of club or organization: Not on file        Attends meetings of clubs or [place] Positive for dizziness. Negative for vertigo, syncope, weakness, numbness and headaches. Psychiatric/Behavioral: Positive for depressed mood. Negative for suicidal ideas, hallucinations, behavioral problems, sleep disturbance and agitation.  The patient i for My Chart if not already registered.

## 2019-09-16 NOTE — PATIENT INSTRUCTIONS
Depression Affects Your Mind and Body  Everyone feels sad or “blue” from time to time for a few days or weeks. Depression is when these feelings don't go away and they interfere with daily life.  Depression is a real illness that can develop at any age. Everyone feels down at times. The blues are a natural part of life. But an unhappy period that’s intense or lasts for more than a few weeks is different. It can be a sign of depression. Depression is a serious illness. It's not a sign of weakness.  It's not · National Suicide Prevention Iqvpjxvf482-633-9968 (Weekend[employer]cores.is. org    Date Last Reviewed: 4/1/2019  © 2506-4087 [employer] [place] [address]. [address] [facility], [address] [place] [address]. All rights reserved.  This information is n

## 2019-09-19 ENCOUNTER — HOSPITAL ENCOUNTER (OUTPATIENT)
Facility: HOSPITAL | Age: 74
Setting detail: OBSERVATION
LOS: 1 days | Discharge: HOME | End: 2019-09-20
Attending: EMERGENCY MEDICINE | Admitting: HOSPITALIST
Payer: MEDICARE

## 2019-09-19 DIAGNOSIS — R53.1 WEAKNESS GENERALIZED: Primary | ICD-10-CM

## 2019-09-19 DIAGNOSIS — E87.6 ACUTE HYPOKALEMIA: ICD-10-CM

## 2019-09-19 LAB
ANION GAP SERPL CALC-SCNC: 7 MMOL/L (ref 0–18)
BASOPHILS # BLD AUTO: 0.03 X10(3) UL (ref 0–0.2)
BASOPHILS NFR BLD AUTO: 0.4 %
BILIRUB UR QL: NEGATIVE
BUN BLD-MCNC: 19 MG/DL (ref 7–18)
BUN/CREAT SERPL: 19.2 (ref 10–20)
CALCIUM BLD-MCNC: 10 MG/DL (ref 8.5–10.1)
CHLORIDE SERPL-SCNC: 86 MMOL/L (ref 98–112)
CLARITY UR: CLEAR
CO2 SERPL-SCNC: 39 MMOL/L (ref 21–32)
COLOR UR: YELLOW
CREAT BLD-MCNC: 0.99 MG/DL (ref 0.55–1.02)
DEPRECATED RDW RBC AUTO: 37.6 FL (ref 35.1–46.3)
EOSINOPHIL # BLD AUTO: 0.12 X10(3) UL (ref 0–0.7)
EOSINOPHIL NFR BLD AUTO: 1.4 %
ERYTHROCYTE [DISTWIDTH] IN BLOOD BY AUTOMATED COUNT: 11.6 % (ref 11–15)
GLUCOSE BLD-MCNC: 140 MG/DL (ref 70–99)
GLUCOSE UR-MCNC: NEGATIVE MG/DL
HAV IGM SER QL: 2.4 MG/DL (ref 1.6–2.6)
HCT VFR BLD AUTO: 40.2 % (ref 35–48)
HGB BLD-MCNC: 14.2 G/DL (ref 12–16)
IMM GRANULOCYTES # BLD AUTO: 0.03 X10(3) UL (ref 0–1)
IMM GRANULOCYTES NFR BLD: 0.4 %
KETONES UR-MCNC: NEGATIVE MG/DL
LYMPHOCYTES # BLD AUTO: 2.32 X10(3) UL (ref 1–4)
LYMPHOCYTES NFR BLD AUTO: 27.9 %
MCH RBC QN AUTO: 31.6 PG (ref 26–34)
MCHC RBC AUTO-ENTMCNC: 35.3 G/DL (ref 31–37)
MCV RBC AUTO: 89.5 FL (ref 80–100)
MONOCYTES # BLD AUTO: 0.91 X10(3) UL (ref 0.1–1)
MONOCYTES NFR BLD AUTO: 10.9 %
NEUTROPHILS # BLD AUTO: 4.91 X10 (3) UL (ref 1.5–7.7)
NEUTROPHILS # BLD AUTO: 4.91 X10(3) UL (ref 1.5–7.7)
NEUTROPHILS NFR BLD AUTO: 59 %
NITRITE UR QL STRIP.AUTO: NEGATIVE
OSMOLALITY SERPL CALC.SUM OF ELEC: 279 MOSM/KG (ref 275–295)
PH UR: 7 [PH] (ref 5–8)
PLATELET # BLD AUTO: 272 10(3)UL (ref 150–450)
POTASSIUM SERPL-SCNC: 2.3 MMOL/L (ref 3.5–5.1)
POTASSIUM SERPL-SCNC: 3.1 MMOL/L (ref 3.5–5.1)
PROT UR-MCNC: 100 MG/DL
RBC # BLD AUTO: 4.49 X10(6)UL (ref 3.8–5.3)
RBC #/AREA URNS AUTO: 4 /HPF
SODIUM SERPL-SCNC: 132 MMOL/L (ref 136–145)
SP GR UR STRIP: 1.01 (ref 1–1.03)
UROBILINOGEN UR STRIP-ACNC: 2
WBC # BLD AUTO: 8.3 X10(3) UL (ref 4–11)
WBC #/AREA URNS AUTO: 8 /HPF

## 2019-09-19 PROCEDURE — 99223 1ST HOSP IP/OBS HIGH 75: CPT | Performed by: HOSPITALIST

## 2019-09-19 PROCEDURE — 99223 1ST HOSP IP/OBS HIGH 75: CPT | Performed by: INTERNAL MEDICINE

## 2019-09-19 RX ORDER — ALBUTEROL SULFATE 90 UG/1
2 AEROSOL, METERED RESPIRATORY (INHALATION) EVERY 4 HOURS PRN
Status: DISCONTINUED | OUTPATIENT
Start: 2019-09-19 | End: 2019-09-20

## 2019-09-19 RX ORDER — POTASSIUM CHLORIDE 14.9 MG/ML
20 INJECTION INTRAVENOUS ONCE
Status: COMPLETED | OUTPATIENT
Start: 2019-09-19 | End: 2019-09-20

## 2019-09-19 RX ORDER — ACETAMINOPHEN 325 MG/1
650 TABLET ORAL EVERY 6 HOURS PRN
Status: DISCONTINUED | OUTPATIENT
Start: 2019-09-19 | End: 2019-09-20

## 2019-09-19 RX ORDER — ESCITALOPRAM OXALATE 10 MG/1
10 TABLET ORAL DAILY
Status: DISCONTINUED | OUTPATIENT
Start: 2019-09-19 | End: 2019-09-20

## 2019-09-19 RX ORDER — SODIUM CHLORIDE 9 MG/ML
INJECTION, SOLUTION INTRAVENOUS
Status: COMPLETED
Start: 2019-09-19 | End: 2019-09-19

## 2019-09-19 RX ORDER — SODIUM CHLORIDE 0.9 % (FLUSH) 0.9 %
3 SYRINGE (ML) INJECTION AS NEEDED
Status: DISCONTINUED | OUTPATIENT
Start: 2019-09-19 | End: 2019-09-20

## 2019-09-19 RX ORDER — DOCUSATE SODIUM 100 MG/1
CAPSULE, LIQUID FILLED ORAL
Status: DISPENSED
Start: 2019-09-19 | End: 2019-09-20

## 2019-09-19 RX ORDER — MONTELUKAST SODIUM 10 MG/1
10 TABLET ORAL NIGHTLY
Status: DISCONTINUED | OUTPATIENT
Start: 2019-09-19 | End: 2019-09-20

## 2019-09-19 RX ORDER — SODIUM CHLORIDE 9 MG/ML
INJECTION, SOLUTION INTRAVENOUS CONTINUOUS
Status: DISCONTINUED | OUTPATIENT
Start: 2019-09-19 | End: 2019-09-20

## 2019-09-19 RX ORDER — LEVOTHYROXINE SODIUM 0.05 MG/1
75 TABLET ORAL
Status: DISCONTINUED | OUTPATIENT
Start: 2019-09-20 | End: 2019-09-20

## 2019-09-19 RX ORDER — ROSUVASTATIN CALCIUM 10 MG/1
40 TABLET, COATED ORAL DAILY
Status: DISCONTINUED | OUTPATIENT
Start: 2019-09-20 | End: 2019-09-20

## 2019-09-19 RX ORDER — DOCUSATE SODIUM 100 MG/1
100 CAPSULE, LIQUID FILLED ORAL 2 TIMES DAILY
Status: DISCONTINUED | OUTPATIENT
Start: 2019-09-19 | End: 2019-09-20

## 2019-09-19 RX ORDER — AZELASTINE 1 MG/ML
2 SPRAY, METERED NASAL 2 TIMES DAILY
Status: DISCONTINUED | OUTPATIENT
Start: 2019-09-19 | End: 2019-09-20

## 2019-09-19 RX ORDER — ONDANSETRON 2 MG/ML
4 INJECTION INTRAMUSCULAR; INTRAVENOUS EVERY 6 HOURS PRN
Status: DISCONTINUED | OUTPATIENT
Start: 2019-09-19 | End: 2019-09-20

## 2019-09-19 RX ORDER — POTASSIUM CHLORIDE 20 MEQ/1
40 TABLET, EXTENDED RELEASE ORAL ONCE
Status: COMPLETED | OUTPATIENT
Start: 2019-09-19 | End: 2019-09-19

## 2019-09-19 NOTE — CONSULTS
[facility]  Report of Consultation    Nubia Ibanez Patient Status:  Inpatient    1945 MRN [id number]   Location [facility] 3W/SW Attending Tasha Reece MD   Hosp Day # 0 PCP Sukhdeep Henderson.  DO Lillian     Reason for Consultation:  - we drink alcohol or use drugs. Allergies:    Adhesive Tape           RASH    Comment:ekg pads.   Codeine                 SWELLING  Nystatin                RASH, ITCHING    Medications:    Current Facility-Administered Medications:   •  [START ON 9/20/2019] ([address]     Osteopenia     Asthma with COPD (chronic obstructive pulmonary disease) ([address]     Essential hypertension     Allergic contact dermatitis due to adhesives     Dizziness     Reactive depression     Weakness generalized     Acute hypokalemia    The

## 2019-09-19 NOTE — PLAN OF CARE
Admitted to [facility] from ED with c/o intermittent SOB, chest heaviness, poor appetite with 12 lb weight loss over past 2 wks, dizziness with a fall prior to admission, fatigue. Oriented to room, plan of care, use of call light to prevent falls.  Remote tele a minimize respiratory effort  - Oxygen supplementation based on oxygen saturation or ABGs  - Provide Smoking Cessation handout, if applicable  - Encourage broncho-pulmonary hygiene including cough, deep breathe, Incentive Spirometry  - Assess the need for s Goal  Description  Patient's Short Term Goal: Find out why I feel how I'm feeling    Interventions:   - remote tele monitoring  - plan EGD in am  - nutrition consult for weight loss  - See additional Care Plan goals for specific interventions   Outcome: No

## 2019-09-19 NOTE — H&P
[facility]    PATIENT'S NAME: Rebeccayasemin Millan   ATTENDING PHYSICIAN: Doug Galvan MD   PATIENT ACCOUNT#:   [id number]    LOCATION:  [place] [address]  MEDICAL RECORD #:   [id number]       YOB: 1945  ADMISSION DATE:       09/19/2019 by Dr. Antoinette Do in 2016, which showed diverticulosis and internal hemorrhoids. Never had an EGD before. She denies using over-the-counter NSAIDs. PHYSICAL EXAMINATION:    GENERAL:  Alert and oriented to time, place, and person.   Mild to [facility]

## 2019-09-19 NOTE — ED INITIAL ASSESSMENT (HPI)
Patient here with increasing fatigue for the last two weeks. States she has intermittent SOB and chest pressure that is not brought on by anything. Patient also has positioinal dizziness and her limbs \"feel heavy. \" Patient was put on an antidepressant 4

## 2019-09-20 ENCOUNTER — ANESTHESIA EVENT (OUTPATIENT)
Dept: ENDOSCOPY | Facility: HOSPITAL | Age: 74
End: 2019-09-20
Payer: MEDICARE

## 2019-09-20 ENCOUNTER — ANESTHESIA (OUTPATIENT)
Dept: ENDOSCOPY | Facility: HOSPITAL | Age: 74
End: 2019-09-20
Payer: MEDICARE

## 2019-09-20 VITALS
WEIGHT: 127 LBS | SYSTOLIC BLOOD PRESSURE: 112 MMHG | HEIGHT: 64 IN | BODY MASS INDEX: 21.68 KG/M2 | DIASTOLIC BLOOD PRESSURE: 49 MMHG | HEART RATE: 58 BPM | RESPIRATION RATE: 13 BRPM | TEMPERATURE: 98 F | OXYGEN SATURATION: 99 %

## 2019-09-20 LAB
ALBUMIN SERPL-MCNC: 3 G/DL (ref 3.4–5)
ALBUMIN/GLOB SERPL: 0.9 {RATIO} (ref 1–2)
ALP LIVER SERPL-CCNC: 51 U/L (ref 55–142)
ALT SERPL-CCNC: 18 U/L (ref 13–56)
ANION GAP SERPL CALC-SCNC: 4 MMOL/L (ref 0–18)
AST SERPL-CCNC: 26 U/L (ref 15–37)
BASOPHILS # BLD AUTO: 0.02 X10(3) UL (ref 0–0.2)
BASOPHILS NFR BLD AUTO: 0.4 %
BILIRUB SERPL-MCNC: 0.4 MG/DL (ref 0.1–2)
BUN BLD-MCNC: 15 MG/DL (ref 7–18)
BUN/CREAT SERPL: 16.9 (ref 10–20)
CALCIUM BLD-MCNC: 8.6 MG/DL (ref 8.5–10.1)
CHLORIDE SERPL-SCNC: 105 MMOL/L (ref 98–112)
CO2 SERPL-SCNC: 32 MMOL/L (ref 21–32)
CREAT BLD-MCNC: 0.89 MG/DL (ref 0.55–1.02)
DEPRECATED RDW RBC AUTO: 38.9 FL (ref 35.1–46.3)
EOSINOPHIL # BLD AUTO: 0.08 X10(3) UL (ref 0–0.7)
EOSINOPHIL NFR BLD AUTO: 1.5 %
ERYTHROCYTE [DISTWIDTH] IN BLOOD BY AUTOMATED COUNT: 11.7 % (ref 11–15)
GLOBULIN PLAS-MCNC: 3.2 G/DL (ref 2.8–4.4)
GLUCOSE BLD-MCNC: 98 MG/DL (ref 70–99)
HCT VFR BLD AUTO: 34.3 % (ref 35–48)
HGB BLD-MCNC: 11.5 G/DL (ref 12–16)
IMM GRANULOCYTES # BLD AUTO: 0 X10(3) UL (ref 0–1)
IMM GRANULOCYTES NFR BLD: 0 %
LYMPHOCYTES # BLD AUTO: 1.6 X10(3) UL (ref 1–4)
LYMPHOCYTES NFR BLD AUTO: 30.2 %
M PROTEIN MFR SERPL ELPH: 6.2 G/DL (ref 6.4–8.2)
MCH RBC QN AUTO: 30.8 PG (ref 26–34)
MCHC RBC AUTO-ENTMCNC: 33.5 G/DL (ref 31–37)
MCV RBC AUTO: 92 FL (ref 80–100)
MONOCYTES # BLD AUTO: 0.75 X10(3) UL (ref 0.1–1)
MONOCYTES NFR BLD AUTO: 14.2 %
NEUTROPHILS # BLD AUTO: 2.85 X10 (3) UL (ref 1.5–7.7)
NEUTROPHILS # BLD AUTO: 2.85 X10(3) UL (ref 1.5–7.7)
NEUTROPHILS NFR BLD AUTO: 53.7 %
OSMOLALITY SERPL CALC.SUM OF ELEC: 293 MOSM/KG (ref 275–295)
PLATELET # BLD AUTO: 203 10(3)UL (ref 150–450)
POTASSIUM SERPL-SCNC: 4.3 MMOL/L (ref 3.5–5.1)
POTASSIUM SERPL-SCNC: 4.3 MMOL/L (ref 3.5–5.1)
RBC # BLD AUTO: 3.73 X10(6)UL (ref 3.8–5.3)
SODIUM SERPL-SCNC: 141 MMOL/L (ref 136–145)
WBC # BLD AUTO: 5.3 X10(3) UL (ref 4–11)

## 2019-09-20 PROCEDURE — 99217 OBSERVATION CARE DISCHARGE: CPT | Performed by: HOSPITALIST

## 2019-09-20 PROCEDURE — 0DB68ZX EXCISION OF STOMACH, VIA NATURAL OR ARTIFICIAL OPENING ENDOSCOPIC, DIAGNOSTIC: ICD-10-PCS | Performed by: INTERNAL MEDICINE

## 2019-09-20 PROCEDURE — 43239 EGD BIOPSY SINGLE/MULTIPLE: CPT | Performed by: INTERNAL MEDICINE

## 2019-09-20 RX ORDER — PANTOPRAZOLE SODIUM 40 MG/1
40 TABLET, DELAYED RELEASE ORAL
Qty: 30 TABLET | Refills: 0 | Status: SHIPPED | OUTPATIENT
Start: 2019-09-21 | End: 2019-10-14

## 2019-09-20 RX ORDER — AZELASTINE 1 MG/ML
2 SPRAY, METERED NASAL 2 TIMES DAILY PRN
Status: DISCONTINUED | OUTPATIENT
Start: 2019-09-20 | End: 2019-09-20

## 2019-09-20 RX ORDER — NALOXONE HYDROCHLORIDE 0.4 MG/ML
80 INJECTION, SOLUTION INTRAMUSCULAR; INTRAVENOUS; SUBCUTANEOUS AS NEEDED
Status: CANCELLED | OUTPATIENT
Start: 2019-09-20 | End: 2019-09-20

## 2019-09-20 RX ORDER — ROSUVASTATIN CALCIUM 10 MG/1
TABLET, COATED ORAL
Status: COMPLETED
Start: 2019-09-20 | End: 2019-09-20

## 2019-09-20 RX ORDER — PANTOPRAZOLE SODIUM 40 MG/1
40 TABLET, DELAYED RELEASE ORAL
Status: DISCONTINUED | OUTPATIENT
Start: 2019-09-21 | End: 2019-09-20

## 2019-09-20 RX ORDER — DOCUSATE SODIUM 100 MG/1
CAPSULE, LIQUID FILLED ORAL
Status: COMPLETED
Start: 2019-09-20 | End: 2019-09-20

## 2019-09-20 RX ORDER — SODIUM CHLORIDE 9 MG/ML
INJECTION, SOLUTION INTRAVENOUS
Status: COMPLETED
Start: 2019-09-20 | End: 2019-09-20

## 2019-09-20 RX ORDER — LEVOTHYROXINE SODIUM 0.05 MG/1
TABLET ORAL
Status: COMPLETED
Start: 2019-09-20 | End: 2019-09-20

## 2019-09-20 RX ORDER — PANTOPRAZOLE SODIUM 40 MG/1
INJECTION, POWDER, FOR SOLUTION INTRAVENOUS
Status: DISCONTINUED
Start: 2019-09-20 | End: 2019-09-20

## 2019-09-20 RX ORDER — SODIUM CHLORIDE, SODIUM LACTATE, POTASSIUM CHLORIDE, CALCIUM CHLORIDE 600; 310; 30; 20 MG/100ML; MG/100ML; MG/100ML; MG/100ML
INJECTION, SOLUTION INTRAVENOUS CONTINUOUS
Status: CANCELLED | OUTPATIENT
Start: 2019-09-20

## 2019-09-20 RX ORDER — POTASSIUM CHLORIDE 14.9 MG/ML
INJECTION INTRAVENOUS
Status: DISPENSED
Start: 2019-09-20 | End: 2019-09-20

## 2019-09-20 RX ADMIN — SODIUM CHLORIDE: 9 INJECTION, SOLUTION INTRAVENOUS at 12:44:00

## 2019-09-20 NOTE — PLAN OF CARE
Problem: Patient Centered Care  Goal: Patient preferences are identified and integrated in the patient's plan of care  Description  Interventions:  - What would you like us to know as we care for you?  I am normally very healthy, walk 2 miles per day, allen ventilation and oxygenation  Description  INTERVENTIONS:  - Assess for changes in respiratory status  - Assess for changes in mentation and behavior  - Position to facilitate oxygenation and minimize respiratory effort  - Oxygen supplementation based on ox Monitor response to electrolyte replacements, including rhythm and repeat lab results as appropriate  - Fluid restriction as ordered  - Instruct patient on fluid and nutrition restrictions as appropriate  Outcome: Adequate for Discharge     Problem: SAFETY

## 2019-09-20 NOTE — PAYOR COMM NOTE
--------------    STATUS CHANGED TO OBSERVATION ON 9/20/19 PER ORDER BELOW    PLACE IN OBSERVATION (Order #[id number]) on 9/20/19      ADMISSION REVIEW     Antonia Dorman MA [facility]  Subscriber #:  [id number]  Authorization Number: [id number]  Admit date: 9/19/19

## 2019-09-20 NOTE — ANESTHESIA POSTPROCEDURE EVALUATION
Patient: Florencia March    Procedure Summary     Date:  09/20/19 Room / Location:  [facility] ENDOSCOPY 01 / [facility] ENDOSCOPY    Anesthesia Start:  6607 Anesthesia Stop:  9037    Procedure:  ESOPHAGOGASTRODUODENOSCOPY (EGD) (N/A ) Diagnosis:  (gastritis, hiatal hernia

## 2019-09-20 NOTE — CM/SW NOTE
COND 44: Patient failed Inpatient criteria. Second level of review completed and supports Observation. UR committee in agreement. Discussed with Dr Nas Moore approves.

## 2019-09-20 NOTE — DISCHARGE SUMMARY
Dc summary#[id number]  > 30 min spent on [facility] Discharge Diagnoses: hypokalemia    Lace+ Score: 64  59-90 High Risk  29-58 Medium Risk  0-28   Low Risk.     TCM Follow-Up Recommendation:  LACE < 29: Low Risk of readmission after discharge from the [facility]

## 2019-09-20 NOTE — PLAN OF CARE
Problem: Patient Centered Care  Goal: Patient preferences are identified and integrated in the patient's plan of care  Description  Interventions:  - What would you like us to know as we care for you?  I am normally very healthy, walk 2 miles per day, allen oxygenation  Description  INTERVENTIONS:  - Assess for changes in respiratory status  - Assess for changes in mentation and behavior  - Position to facilitate oxygenation and minimize respiratory effort  - Oxygen supplementation based on oxygen saturation including rhythm and repeat lab results as appropriate  - Fluid restriction as ordered  - Instruct patient on fluid and nutrition restrictions as appropriate  Outcome: Progressing     Problem: SAFETY ADULT - FALL  Goal: Free from fall injury  Description

## 2019-09-20 NOTE — ANESTHESIA PREPROCEDURE EVALUATION
Anesthesia PreOp Note    HPI:     Jas Polanco is a 69year old female who presents for preoperative consultation requested by: Lacy Tinoco MD    Date of Surgery: 9/19/2019 - 9/20/2019    Procedure(s):  ESOPHAGOGASTRODUODENOSCOPY (EGD)  Indication: o Dr. Chely Waite @ [facility]   • CHOLECYSTECTOMY     • HYSTERECTOMY           Medications Prior to Admission:  Citalopram Hydrobromide 10 MG Oral Tab Take 1 tablet (10 mg total) by mouth daily.  Disp: 30 tablet Rfl: 3 9/18/2019 at Unknown time   Triamterene-HCTZ 37.5- acetaminophen (TYLENOL) tab 650 mg 650 mg Oral Q6H PRN Zoila Thomas MD    ondansetron HCl (ZOFRAN) injection 4 mg 4 mg Intravenous Q6H PRN Zoila Thomas MD    Albuterol Sulfate  (90 Base) MCG/ACT inhaler 2 puff 2 puff Inhalation Q4H PRN Erik Days per week: Not on file        Minutes per session: Not on file      Stress: Not on file    Relationships      Social connections:        Talks on phone: Not on file        Gets together: Not on file        Attends Buddhism service: Not on file Vital Signs: Body mass index is 21.8 kg/m². height is 1.626 m (5' 4\") and weight is 57.6 kg (127 lb). Her oral temperature is 97.6 °F (36.4 °C). Her blood pressure is 124/68 and her pulse is 63. Her respiration is 13 and oxygen saturation is 96%.     [facility]

## 2019-09-20 NOTE — OPERATIVE REPORT
[facility] Endoscopy Report      Preoperative Diagnosis:  - weight loss   - hiatal hernia noted on CT scan      Postoperative Diagnosis:  - hiatal hernia 3 cm  - Schatzki's ring  - gastritis      Procedure:    Esophagogastroduodenoscopy

## 2019-09-22 NOTE — DISCHARGE SUMMARY
[facility]    PATIENT'S NAME: HAN KUHN   ATTENDING PHYSICIAN: Stefano Davey MD   PATIENT ACCOUNT#:   [id number]    LOCATION:  [address] [place] [address]  MEDICAL RECORD #:   [id number]       YOB: 1945  ADMISSION DATE:       09/19/2 each nostril twice a day. 3.   Vitamin D 2000 units daily. 4.   Citalopram 10 mg daily. 5.   Synthroid 75 mcg daily. 6.   Singulair 10 mg daily. 7.   Crestor 40 mg nightly. 8.   Protonix 40 mg daily. 9.   Dyazide, will hold off for now.   Check blood

## 2019-09-25 ENCOUNTER — TELEPHONE (OUTPATIENT)
Dept: INTERNAL MEDICINE UNIT | Facility: HOSPITAL | Age: 74
End: 2019-09-25

## 2019-09-25 NOTE — TELEPHONE ENCOUNTER
Pt discharged from [facility] on 9/20/19 . Pt called to schedule follow up with Primary Care Physician on 9/26/19.

## 2019-09-25 NOTE — ED PROVIDER NOTES
Patient Seen in: [facility] 3w/sw      History   Patient presents with:  Fatigue (constitutional, neurologic)    Stated Complaint: Weakness.  Near syncopy     HPI    Patient is a 77-year-old female who states that she feels extremely weak and near (36.4 °C) (Oral)   Resp 13   Ht 162.6 cm (5' 4\")   Wt 57.6 kg   SpO2 99%   BMI 21.80 kg/m²         Physical Exam   Constitutional: She is oriented to person, place, and time. She appears well-developed and well-nourished.    HENT:   Head: Normocephalic and 34.3 (*)     All other components within normal limits   MAGNESIUM - Normal   POTASSIUM - Normal   CBC WITH DIFFERENTIAL WITH PLATELET    Narrative: The following orders were created for panel order CBC WITH DIFFERENTIAL WITH PLATELET.   Procedure primary care provider within the next three months to obtain basic health screening including reassessment of your blood pressure.     Medications Prescribed:  Discharge Medication List as of 9/20/2019  3:25 PM    START taking these medications    Pantopraz

## 2019-09-26 ENCOUNTER — OFFICE VISIT (OUTPATIENT)
Dept: FAMILY MEDICINE CLINIC | Facility: CLINIC | Age: 74
End: 2019-09-26
Payer: MEDICARE

## 2019-09-26 VITALS
DIASTOLIC BLOOD PRESSURE: 55 MMHG | BODY MASS INDEX: 23.04 KG/M2 | WEIGHT: 130 LBS | TEMPERATURE: 98 F | HEIGHT: 63 IN | SYSTOLIC BLOOD PRESSURE: 102 MMHG | HEART RATE: 65 BPM

## 2019-09-26 DIAGNOSIS — E87.6 HYPOKALEMIA: Primary | ICD-10-CM

## 2019-09-26 DIAGNOSIS — K22.2 SCHATZKI'S RING: ICD-10-CM

## 2019-09-26 DIAGNOSIS — K29.50 CHRONIC GASTRITIS WITHOUT BLEEDING, UNSPECIFIED GASTRITIS TYPE: ICD-10-CM

## 2019-09-26 DIAGNOSIS — K44.9 HIATAL HERNIA: ICD-10-CM

## 2019-09-26 PROCEDURE — G0008 ADMIN INFLUENZA VIRUS VAC: HCPCS | Performed by: FAMILY MEDICINE

## 2019-09-26 PROCEDURE — 1111F DSCHRG MED/CURRENT MED MERGE: CPT | Performed by: FAMILY MEDICINE

## 2019-09-26 PROCEDURE — 99214 OFFICE O/P EST MOD 30 MIN: CPT | Performed by: FAMILY MEDICINE

## 2019-09-26 PROCEDURE — 90662 IIV NO PRSV INCREASED AG IM: CPT | Performed by: FAMILY MEDICINE

## 2019-09-27 ENCOUNTER — MED REC SCAN ONLY (OUTPATIENT)
Dept: GASTROENTEROLOGY | Facility: CLINIC | Age: 74
End: 2019-09-27

## 2019-10-07 ENCOUNTER — APPOINTMENT (OUTPATIENT)
Dept: LAB | Age: 74
End: 2019-10-07
Attending: FAMILY MEDICINE
Payer: MEDICARE

## 2019-10-07 DIAGNOSIS — E87.6 HYPOKALEMIA: ICD-10-CM

## 2019-10-07 PROCEDURE — 80048 BASIC METABOLIC PNL TOTAL CA: CPT

## 2019-10-07 PROCEDURE — 36415 COLL VENOUS BLD VENIPUNCTURE: CPT

## 2019-10-08 ENCOUNTER — TELEPHONE (OUTPATIENT)
Dept: FAMILY MEDICINE CLINIC | Facility: CLINIC | Age: 74
End: 2019-10-08

## 2019-10-08 NOTE — TELEPHONE ENCOUNTER
----- Message from Dyan Doran DO sent at 10/7/2019  8:18 PM CDT -----  Potassium level is fine continue on the current medication regimen

## 2019-10-14 RX ORDER — PANTOPRAZOLE SODIUM 40 MG/1
40 TABLET, DELAYED RELEASE ORAL
Qty: 90 TABLET | Refills: 11 | Status: SHIPPED | OUTPATIENT
Start: 2019-10-14 | End: 2019-11-11 | Stop reason: ALTCHOICE

## 2019-10-14 NOTE — TELEPHONE ENCOUNTER
Refill passed per [facility], [facility] protocol.   However never prescribed by PCP in past.    Requested Prescriptions   Pending Prescriptions Disp Refills   • Pantoprazole Sodium 40 MG Oral Tab EC 30 tablet 0     Sig: Take 1 tablet (40 mg total) by mouth every

## 2019-10-22 ENCOUNTER — TELEPHONE (OUTPATIENT)
Dept: FAMILY MEDICINE CLINIC | Facility: CLINIC | Age: 74
End: 2019-10-22

## 2019-10-22 NOTE — TELEPHONE ENCOUNTER
Spoke with the patient who wanted to know her lab results from 10/7/19 (Name and  of pt verified). All results and recommendations reviewed. Patient verbalizes understanding, denies further questions and agrees with plan of care.       Notes recorded by

## 2019-11-11 ENCOUNTER — OFFICE VISIT (OUTPATIENT)
Dept: FAMILY MEDICINE CLINIC | Facility: CLINIC | Age: 74
End: 2019-11-11
Payer: MEDICARE

## 2019-11-11 VITALS
RESPIRATION RATE: 20 BRPM | WEIGHT: 131 LBS | SYSTOLIC BLOOD PRESSURE: 112 MMHG | BODY MASS INDEX: 23.21 KG/M2 | HEIGHT: 63 IN | DIASTOLIC BLOOD PRESSURE: 70 MMHG | HEART RATE: 69 BPM

## 2019-11-11 DIAGNOSIS — E87.6 HYPOKALEMIA: Primary | ICD-10-CM

## 2019-11-11 DIAGNOSIS — K44.9 HIATAL HERNIA: ICD-10-CM

## 2019-11-11 DIAGNOSIS — E78.2 MIXED HYPERLIPIDEMIA: ICD-10-CM

## 2019-11-11 DIAGNOSIS — D50.9 IRON DEFICIENCY ANEMIA, UNSPECIFIED IRON DEFICIENCY ANEMIA TYPE: ICD-10-CM

## 2019-11-11 PROCEDURE — 99214 OFFICE O/P EST MOD 30 MIN: CPT | Performed by: FAMILY MEDICINE

## 2019-11-11 RX ORDER — TRIAMTERENE AND HYDROCHLOROTHIAZIDE 37.5; 25 MG/1; MG/1
CAPSULE ORAL
COMMUNITY
Start: 2019-10-05 | End: 2019-11-11 | Stop reason: ALTCHOICE

## 2019-11-11 NOTE — PROGRESS NOTES
Weight has stabalized  Still not a lot of appetite  No n/v  No problems with pain in stomach  Had hypo kalemia -natria and -anemia      Repeat labs were betteer. Has been eating bananas for potassium and avacado.         Patient's past medical surgical f

## 2019-11-16 RX ORDER — LEVOTHYROXINE SODIUM 0.07 MG/1
TABLET ORAL
Qty: 90 TABLET | Refills: 1 | Status: SHIPPED | OUTPATIENT
Start: 2019-11-16 | End: 2020-05-21

## 2019-11-17 NOTE — TELEPHONE ENCOUNTER
Refill passed per [facility], [facility] protocol.     Hypothyroid Medications  Protocol Criteria:  Appointment scheduled in the past 12 months or the next 3 months  TSH resulted in the past 12 months that is normal  Recent Outpatient Visits            5 days ago

## 2019-11-18 DIAGNOSIS — E78.2 MIXED HYPERLIPIDEMIA: ICD-10-CM

## 2019-11-18 RX ORDER — ROSUVASTATIN CALCIUM 40 MG/1
TABLET, COATED ORAL
Qty: 90 TABLET | Refills: 1 | Status: SHIPPED | OUTPATIENT
Start: 2019-11-18 | End: 2020-06-18

## 2019-12-09 RX ORDER — MONTELUKAST SODIUM 10 MG/1
TABLET ORAL
Qty: 90 TABLET | Refills: 1 | Status: SHIPPED | OUTPATIENT
Start: 2019-12-09 | End: 2020-06-08

## 2019-12-10 NOTE — TELEPHONE ENCOUNTER
Refill passed per [facility], [facility] protocol.   Refill Protocol Appointment Criteria  · Appointment scheduled in the past 6 months or in the next 3 months  Recent Outpatient Visits            4 weeks ago Hypokalemia    [address] [facility]

## 2019-12-31 ENCOUNTER — TELEPHONE (OUTPATIENT)
Dept: FAMILY MEDICINE CLINIC | Facility: CLINIC | Age: 74
End: 2019-12-31

## 2019-12-31 NOTE — TELEPHONE ENCOUNTER
Form from [employer]  Was completed and signed by Ashley Jackson. Faxed confirmation received - placed in scanning bin.

## 2020-01-07 ENCOUNTER — LAB ENCOUNTER (OUTPATIENT)
Dept: LAB | Age: 75
End: 2020-01-07
Attending: FAMILY MEDICINE
Payer: MEDICARE

## 2020-01-07 DIAGNOSIS — E87.6 HYPOKALEMIA: ICD-10-CM

## 2020-01-07 DIAGNOSIS — D50.9 IRON DEFICIENCY ANEMIA, UNSPECIFIED IRON DEFICIENCY ANEMIA TYPE: ICD-10-CM

## 2020-01-07 DIAGNOSIS — E78.2 MIXED HYPERLIPIDEMIA: ICD-10-CM

## 2020-01-07 LAB
ALBUMIN SERPL-MCNC: 3.7 G/DL (ref 3.4–5)
ALBUMIN/GLOB SERPL: 0.9 {RATIO} (ref 1–2)
ALP LIVER SERPL-CCNC: 64 U/L (ref 55–142)
ALT SERPL-CCNC: 18 U/L (ref 13–56)
ANION GAP SERPL CALC-SCNC: <0 MMOL/L (ref 0–18)
AST SERPL-CCNC: 25 U/L (ref 15–37)
BASOPHILS # BLD AUTO: 0.04 X10(3) UL (ref 0–0.2)
BASOPHILS NFR BLD AUTO: 0.9 %
BILIRUB SERPL-MCNC: 0.4 MG/DL (ref 0.1–2)
BUN BLD-MCNC: 10 MG/DL (ref 7–18)
BUN/CREAT SERPL: 12.8 (ref 10–20)
CALCIUM BLD-MCNC: 9.4 MG/DL (ref 8.5–10.1)
CHLORIDE SERPL-SCNC: 109 MMOL/L (ref 98–112)
CHOLEST SMN-MCNC: 150 MG/DL (ref ?–200)
CO2 SERPL-SCNC: 34 MMOL/L (ref 21–32)
CREAT BLD-MCNC: 0.78 MG/DL (ref 0.55–1.02)
DEPRECATED RDW RBC AUTO: 41 FL (ref 35.1–46.3)
EOSINOPHIL # BLD AUTO: 0.11 X10(3) UL (ref 0–0.7)
EOSINOPHIL NFR BLD AUTO: 2.4 %
ERYTHROCYTE [DISTWIDTH] IN BLOOD BY AUTOMATED COUNT: 11.9 % (ref 11–15)
GLOBULIN PLAS-MCNC: 3.9 G/DL (ref 2.8–4.4)
GLUCOSE BLD-MCNC: 95 MG/DL (ref 70–99)
HCT VFR BLD AUTO: 41.8 % (ref 35–48)
HDLC SERPL-MCNC: 52 MG/DL (ref 40–59)
HGB BLD-MCNC: 13.6 G/DL (ref 12–16)
IMM GRANULOCYTES # BLD AUTO: 0.01 X10(3) UL (ref 0–1)
IMM GRANULOCYTES NFR BLD: 0.2 %
IRON SATURATION: 33 % (ref 15–50)
IRON SERPL-MCNC: 102 UG/DL (ref 50–170)
LDLC SERPL CALC-MCNC: 74 MG/DL (ref ?–100)
LYMPHOCYTES # BLD AUTO: 1.54 X10(3) UL (ref 1–4)
LYMPHOCYTES NFR BLD AUTO: 34.1 %
M PROTEIN MFR SERPL ELPH: 7.6 G/DL (ref 6.4–8.2)
MCH RBC QN AUTO: 30.6 PG (ref 26–34)
MCHC RBC AUTO-ENTMCNC: 32.5 G/DL (ref 31–37)
MCV RBC AUTO: 93.9 FL (ref 80–100)
MONOCYTES # BLD AUTO: 0.5 X10(3) UL (ref 0.1–1)
MONOCYTES NFR BLD AUTO: 11.1 %
NEUTROPHILS # BLD AUTO: 2.31 X10 (3) UL (ref 1.5–7.7)
NEUTROPHILS # BLD AUTO: 2.31 X10(3) UL (ref 1.5–7.7)
NEUTROPHILS NFR BLD AUTO: 51.3 %
NONHDLC SERPL-MCNC: 98 MG/DL (ref ?–130)
OSMOLALITY SERPL CALC.SUM OF ELEC: 293 MOSM/KG (ref 275–295)
PATIENT FASTING Y/N/NP: YES
PATIENT FASTING Y/N/NP: YES
PLATELET # BLD AUTO: 241 10(3)UL (ref 150–450)
POTASSIUM SERPL-SCNC: 4.7 MMOL/L (ref 3.5–5.1)
RBC # BLD AUTO: 4.45 X10(6)UL (ref 3.8–5.3)
SODIUM SERPL-SCNC: 142 MMOL/L (ref 136–145)
TOTAL IRON BINDING CAPACITY: 307 UG/DL (ref 240–450)
TRANSFERRIN SERPL-MCNC: 206 MG/DL (ref 200–360)
TRIGL SERPL-MCNC: 120 MG/DL (ref 30–149)
VIT B12 SERPL-MCNC: 270 PG/ML (ref 193–986)
VLDLC SERPL CALC-MCNC: 24 MG/DL (ref 0–30)
WBC # BLD AUTO: 4.5 X10(3) UL (ref 4–11)

## 2020-01-07 PROCEDURE — 36415 COLL VENOUS BLD VENIPUNCTURE: CPT

## 2020-01-07 PROCEDURE — 83540 ASSAY OF IRON: CPT

## 2020-01-07 PROCEDURE — 84466 ASSAY OF TRANSFERRIN: CPT

## 2020-01-07 PROCEDURE — 85025 COMPLETE CBC W/AUTO DIFF WBC: CPT

## 2020-01-07 PROCEDURE — 82607 VITAMIN B-12: CPT

## 2020-01-07 PROCEDURE — 80061 LIPID PANEL: CPT

## 2020-01-07 PROCEDURE — 80053 COMPREHEN METABOLIC PANEL: CPT

## 2020-01-13 ENCOUNTER — OFFICE VISIT (OUTPATIENT)
Dept: FAMILY MEDICINE CLINIC | Facility: CLINIC | Age: 75
End: 2020-01-13
Payer: MEDICARE

## 2020-01-13 VITALS
HEART RATE: 70 BPM | DIASTOLIC BLOOD PRESSURE: 64 MMHG | SYSTOLIC BLOOD PRESSURE: 120 MMHG | HEIGHT: 63 IN | BODY MASS INDEX: 23.74 KG/M2 | WEIGHT: 134 LBS | TEMPERATURE: 98 F

## 2020-01-13 DIAGNOSIS — E03.9 ACQUIRED HYPOTHYROIDISM: ICD-10-CM

## 2020-01-13 DIAGNOSIS — E78.2 MIXED HYPERLIPIDEMIA: ICD-10-CM

## 2020-01-13 DIAGNOSIS — I70.0 ATHEROSCLEROSIS OF AORTA (HCC): ICD-10-CM

## 2020-01-13 DIAGNOSIS — Z12.31 VISIT FOR SCREENING MAMMOGRAM: ICD-10-CM

## 2020-01-13 DIAGNOSIS — K29.50 CHRONIC GASTRITIS WITHOUT BLEEDING, UNSPECIFIED GASTRITIS TYPE: ICD-10-CM

## 2020-01-13 DIAGNOSIS — M85.80 OSTEOPENIA, UNSPECIFIED LOCATION: ICD-10-CM

## 2020-01-13 DIAGNOSIS — J44.9 ASTHMA WITH COPD (CHRONIC OBSTRUCTIVE PULMONARY DISEASE) (HCC): Primary | Chronic | ICD-10-CM

## 2020-01-13 PROCEDURE — 99214 OFFICE O/P EST MOD 30 MIN: CPT | Performed by: FAMILY MEDICINE

## 2020-01-13 NOTE — PROGRESS NOTES
Pt here for follow up. Feels well  No problems with meds  Taking her vit d        Patient's past medical surgical family social history was reviewed.     Review of Systems  Allergic: no environmental allergies or food allergies  Cardiovascular: no chest pa

## 2020-01-29 ENCOUNTER — HOSPITAL ENCOUNTER (OUTPATIENT)
Dept: ULTRASOUND IMAGING | Age: 75
Discharge: HOME | End: 2020-01-29
Attending: FAMILY MEDICINE
Payer: MEDICARE

## 2020-01-29 DIAGNOSIS — E03.9 ACQUIRED HYPOTHYROIDISM: ICD-10-CM

## 2020-01-29 PROCEDURE — 76536 US EXAM OF HEAD AND NECK: CPT | Performed by: FAMILY MEDICINE

## 2020-02-04 ENCOUNTER — HOSPITAL ENCOUNTER (OUTPATIENT)
Dept: BONE DENSITY | Age: 75
Discharge: HOME | End: 2020-02-04
Attending: FAMILY MEDICINE
Payer: MEDICARE

## 2020-02-04 DIAGNOSIS — M85.80 OSTEOPENIA, UNSPECIFIED LOCATION: ICD-10-CM

## 2020-02-04 PROCEDURE — 77080 DXA BONE DENSITY AXIAL: CPT | Performed by: FAMILY MEDICINE

## 2020-02-04 RX ORDER — IBANDRONATE SODIUM 150 MG/1
150 TABLET, FILM COATED ORAL
Qty: 3 TABLET | Refills: 1 | Status: SHIPPED | OUTPATIENT
Start: 2020-02-04 | End: 2020-06-08 | Stop reason: ALTCHOICE

## 2020-02-07 ENCOUNTER — HOSPITAL ENCOUNTER (EMERGENCY)
Facility: HOSPITAL | Age: 75
Discharge: HOME | End: 2020-02-07
Attending: EMERGENCY MEDICINE
Payer: MEDICARE

## 2020-02-07 VITALS
SYSTOLIC BLOOD PRESSURE: 136 MMHG | DIASTOLIC BLOOD PRESSURE: 76 MMHG | WEIGHT: 131 LBS | HEART RATE: 82 BPM | BODY MASS INDEX: 24.73 KG/M2 | HEIGHT: 61 IN | OXYGEN SATURATION: 96 % | RESPIRATION RATE: 18 BRPM | TEMPERATURE: 98 F

## 2020-02-07 DIAGNOSIS — R11.2 NAUSEA VOMITING AND DIARRHEA: Primary | ICD-10-CM

## 2020-02-07 DIAGNOSIS — R19.7 NAUSEA VOMITING AND DIARRHEA: Primary | ICD-10-CM

## 2020-02-07 LAB
ALBUMIN SERPL-MCNC: 3.6 G/DL (ref 3.4–5)
ALP LIVER SERPL-CCNC: 65 U/L (ref 55–142)
ALT SERPL-CCNC: 22 U/L (ref 13–56)
ANION GAP SERPL CALC-SCNC: 5 MMOL/L (ref 0–18)
AST SERPL-CCNC: 26 U/L (ref 15–37)
BACTERIA UR QL AUTO: NEGATIVE /HPF
BASOPHILS # BLD AUTO: 0.01 X10(3) UL (ref 0–0.2)
BASOPHILS NFR BLD AUTO: 0.1 %
BILIRUB DIRECT SERPL-MCNC: 0.1 MG/DL (ref 0–0.2)
BILIRUB SERPL-MCNC: 0.3 MG/DL (ref 0.1–2)
BILIRUB UR QL: NEGATIVE
BUN BLD-MCNC: 14 MG/DL (ref 7–18)
BUN/CREAT SERPL: 15.7 (ref 10–20)
CALCIUM BLD-MCNC: 8.8 MG/DL (ref 8.5–10.1)
CHLORIDE SERPL-SCNC: 106 MMOL/L (ref 98–112)
CO2 SERPL-SCNC: 33 MMOL/L (ref 21–32)
COLOR UR: YELLOW
CREAT BLD-MCNC: 0.89 MG/DL (ref 0.55–1.02)
DEPRECATED RDW RBC AUTO: 41.1 FL (ref 35.1–46.3)
EOSINOPHIL # BLD AUTO: 0.03 X10(3) UL (ref 0–0.7)
EOSINOPHIL NFR BLD AUTO: 0.3 %
ERYTHROCYTE [DISTWIDTH] IN BLOOD BY AUTOMATED COUNT: 12.2 % (ref 11–15)
GLUCOSE BLD-MCNC: 175 MG/DL (ref 70–99)
GLUCOSE UR-MCNC: NEGATIVE MG/DL
HCT VFR BLD AUTO: 39 % (ref 35–48)
HGB BLD-MCNC: 13.1 G/DL (ref 12–16)
HGB UR QL STRIP.AUTO: NEGATIVE
IMM GRANULOCYTES # BLD AUTO: 0.03 X10(3) UL (ref 0–1)
IMM GRANULOCYTES NFR BLD: 0.3 %
KETONES UR-MCNC: NEGATIVE MG/DL
LEUKOCYTE ESTERASE UR QL STRIP.AUTO: NEGATIVE
LIPASE SERPL-CCNC: 118 U/L (ref 73–393)
LYMPHOCYTES # BLD AUTO: 1.17 X10(3) UL (ref 1–4)
LYMPHOCYTES NFR BLD AUTO: 11.4 %
M PROTEIN MFR SERPL ELPH: 7.2 G/DL (ref 6.4–8.2)
MCH RBC QN AUTO: 30.9 PG (ref 26–34)
MCHC RBC AUTO-ENTMCNC: 33.6 G/DL (ref 31–37)
MCV RBC AUTO: 92 FL (ref 80–100)
MONOCYTES # BLD AUTO: 0.64 X10(3) UL (ref 0.1–1)
MONOCYTES NFR BLD AUTO: 6.2 %
NEUTROPHILS # BLD AUTO: 8.4 X10 (3) UL (ref 1.5–7.7)
NEUTROPHILS # BLD AUTO: 8.4 X10(3) UL (ref 1.5–7.7)
NEUTROPHILS NFR BLD AUTO: 81.7 %
NITRITE UR QL STRIP.AUTO: NEGATIVE
OSMOLALITY SERPL CALC.SUM OF ELEC: 303 MOSM/KG (ref 275–295)
PH UR: 9 [PH] (ref 5–8)
PLATELET # BLD AUTO: 220 10(3)UL (ref 150–450)
POTASSIUM SERPL-SCNC: 3.9 MMOL/L (ref 3.5–5.1)
PROT UR-MCNC: 100 MG/DL
RBC # BLD AUTO: 4.24 X10(6)UL (ref 3.8–5.3)
RBC #/AREA URNS AUTO: 2 /HPF
SODIUM SERPL-SCNC: 144 MMOL/L (ref 136–145)
SP GR UR STRIP: 1.01 (ref 1–1.03)
UROBILINOGEN UR STRIP-ACNC: <2
WBC # BLD AUTO: 10.3 X10(3) UL (ref 4–11)
WBC #/AREA URNS AUTO: <1 /HPF

## 2020-02-07 PROCEDURE — 93010 ELECTROCARDIOGRAM REPORT: CPT | Performed by: EMERGENCY MEDICINE

## 2020-02-07 PROCEDURE — 80048 BASIC METABOLIC PNL TOTAL CA: CPT | Performed by: EMERGENCY MEDICINE

## 2020-02-07 PROCEDURE — 93005 ELECTROCARDIOGRAM TRACING: CPT

## 2020-02-07 PROCEDURE — 96361 HYDRATE IV INFUSION ADD-ON: CPT

## 2020-02-07 PROCEDURE — 80076 HEPATIC FUNCTION PANEL: CPT | Performed by: EMERGENCY MEDICINE

## 2020-02-07 PROCEDURE — 85025 COMPLETE CBC W/AUTO DIFF WBC: CPT | Performed by: EMERGENCY MEDICINE

## 2020-02-07 PROCEDURE — 99284 EMERGENCY DEPT VISIT MOD MDM: CPT

## 2020-02-07 PROCEDURE — 81001 URINALYSIS AUTO W/SCOPE: CPT | Performed by: EMERGENCY MEDICINE

## 2020-02-07 PROCEDURE — 96374 THER/PROPH/DIAG INJ IV PUSH: CPT

## 2020-02-07 PROCEDURE — 83690 ASSAY OF LIPASE: CPT | Performed by: EMERGENCY MEDICINE

## 2020-02-07 RX ORDER — FAMOTIDINE 20 MG/1
20 TABLET ORAL 2 TIMES DAILY PRN
Qty: 30 TABLET | Refills: 0 | Status: SHIPPED | OUTPATIENT
Start: 2020-02-07 | End: 2020-03-08

## 2020-02-07 RX ORDER — ONDANSETRON 2 MG/ML
4 INJECTION INTRAMUSCULAR; INTRAVENOUS ONCE
Status: COMPLETED | OUTPATIENT
Start: 2020-02-07 | End: 2020-02-07

## 2020-02-07 RX ORDER — DICYCLOMINE HYDROCHLORIDE 10 MG/1
10 CAPSULE ORAL 3 TIMES DAILY PRN
Qty: 20 CAPSULE | Refills: 0 | Status: SHIPPED | OUTPATIENT
Start: 2020-02-07 | End: 2020-03-11 | Stop reason: ALTCHOICE

## 2020-02-07 RX ORDER — ONDANSETRON 4 MG/1
4 TABLET, ORALLY DISINTEGRATING ORAL EVERY 8 HOURS PRN
Qty: 10 TABLET | Refills: 0 | Status: SHIPPED | OUTPATIENT
Start: 2020-02-07 | End: 2020-02-14

## 2020-02-07 NOTE — ED NOTES
Pt dcd to home aox3, ambulatory, discharge instruction given and voices understanding, prescription sent to preferred pharmacy, denies any concern

## 2020-02-07 NOTE — ED PROVIDER NOTES
Patient Seen in: [facility] Emergency Department      History   Patient presents with:  Gas    Stated Complaint:     HPI    70year old female with h/o high cholesterol and hypothyroidism who presents with acute onset n/v/d and abd cramping.  Pt st Physical Exam  Vitals signs and nursing note reviewed. Constitutional:       General: She is not in acute distress. Appearance: She is well-developed. She is not toxic-appearing or diaphoretic. HENT:      Head: Normocephalic and atraumatic. Normal   LIPASE - Normal   CBC WITH DIFFERENTIAL WITH PLATELET    Narrative: The following orders were created for panel order CBC WITH DIFFERENTIAL WITH PLATELET.   Procedure                               Abnormality         Status as needed for Nausea., Normal, Disp-10 tablet, R-0    Dicyclomine HCl (BENTYL) 10 MG Oral Cap  Take 1 capsule (10 mg total) by mouth 3 (three) times daily as needed (abdominal cramping). , Normal, Disp-20 capsule, R-0    famoTIDine (PEPCID) 20 MG Oral Tab

## 2020-02-07 NOTE — ED INITIAL ASSESSMENT (HPI)
Patient presents to ED with c/o of \"gas an bloating that started at 10pm\". Patient states she had bilat upper quadrant pain and then vomited. Patient states that she feels like her sternum is burning. Patient also has c/o of diarrhea.

## 2020-02-25 RX ORDER — LOSARTAN POTASSIUM 100 MG/1
TABLET ORAL
Qty: 90 TABLET | Refills: 0 | OUTPATIENT
Start: 2020-02-25

## 2020-02-25 NOTE — TELEPHONE ENCOUNTER
Dr Vines 16- do not see med on active med list  Hypertensive Medications  Protocol Criteria:  · Appointment scheduled in the past 6 months or in the next 3 months  · BMP or CMP in the past 12 months  · Creatinine result < 2  Recent Outpatient Visits

## 2020-03-10 ENCOUNTER — NURSE TRIAGE (OUTPATIENT)
Dept: OTHER | Age: 75
End: 2020-03-10

## 2020-03-10 RX ORDER — ALBUTEROL SULFATE 90 UG/1
2 AEROSOL, METERED RESPIRATORY (INHALATION) EVERY 4 HOURS PRN
Qty: 1 INHALER | Refills: 6 | Status: SHIPPED | OUTPATIENT
Start: 2020-03-10 | End: 2020-03-11

## 2020-03-10 NOTE — TELEPHONE ENCOUNTER
Patient requesting refill for Ventolin inhaler. Last refill 5/16/2017  For 1 inhaler with 6 refills. See also triage encounter 3/10/20. Patient is out of medication. Please advise.       Refill Protocol Appointment Criteria  · Appointment scheduled in the

## 2020-03-10 NOTE — TELEPHONE ENCOUNTER
Action Requested: Summary for Provider     []  Critical Lab, Recommendations Needed  [x] Need Additional Advice  []   FYI    []   Need Orders  [x] Need Medications Sent to Pharmacy  []  Other     SUMMARY: Patient states she has dry cough with chest tightne

## 2020-03-11 ENCOUNTER — OFFICE VISIT (OUTPATIENT)
Dept: FAMILY MEDICINE CLINIC | Facility: CLINIC | Age: 75
End: 2020-03-11
Payer: MEDICARE

## 2020-03-11 VITALS
BODY MASS INDEX: 24.89 KG/M2 | HEIGHT: 61 IN | RESPIRATION RATE: 18 BRPM | WEIGHT: 131.81 LBS | HEART RATE: 63 BPM | DIASTOLIC BLOOD PRESSURE: 61 MMHG | TEMPERATURE: 98 F | SYSTOLIC BLOOD PRESSURE: 100 MMHG

## 2020-03-11 DIAGNOSIS — R05.9 COUGH: Primary | ICD-10-CM

## 2020-03-11 DIAGNOSIS — R06.2 WHEEZING: ICD-10-CM

## 2020-03-11 PROCEDURE — 99213 OFFICE O/P EST LOW 20 MIN: CPT | Performed by: PHYSICIAN ASSISTANT

## 2020-03-11 RX ORDER — CHOLECALCIFEROL (VITAMIN D3) 50 MCG
TABLET ORAL
Qty: 90 TABLET | Refills: 11 | Status: SHIPPED | OUTPATIENT
Start: 2020-03-11 | End: 2021-06-12

## 2020-03-11 RX ORDER — ALBUTEROL SULFATE 90 UG/1
2 AEROSOL, METERED RESPIRATORY (INHALATION) EVERY 4 HOURS PRN
Qty: 1 INHALER | Refills: 6 | Status: SHIPPED | OUTPATIENT
Start: 2020-03-11 | End: 2020-03-30

## 2020-03-11 RX ORDER — BENZONATATE 200 MG/1
200 CAPSULE ORAL 3 TIMES DAILY PRN
Qty: 30 CAPSULE | Refills: 0 | Status: SHIPPED | OUTPATIENT
Start: 2020-03-11 | End: 2020-06-08 | Stop reason: ALTCHOICE

## 2020-03-11 RX ORDER — METHYLPREDNISOLONE 4 MG/1
TABLET ORAL
Qty: 1 KIT | Refills: 0 | Status: SHIPPED | OUTPATIENT
Start: 2020-03-11 | End: 2020-06-08 | Stop reason: ALTCHOICE

## 2020-03-12 NOTE — PROGRESS NOTES
HPI:     Cough   This is a new problem. The current episode started in the past 7 days. The problem has been gradually worsening. The cough is non-productive. Associated symptoms include shortness of breath.  Pertinent negatives include no chest pain, chill Completed    No LMP recorded.  Patient is postmenopausal.   Past Medical History:     Past Medical History:   Diagnosis Date   • Age-related nuclear cataract of both eyes 1/6/2015   • Floaters- per history, but none seen on exam 1/6/2015   • Hyperlipidemia member of club or organization: Not on file        Attends meetings of clubs or organizations: Not on file        Relationship status: Not on file      Intimate partner violence:        Fear of current or ex partner: Not on file        Emotionally abused: mass index is 24.9 kg/m². Physical Exam:   Physical Exam   Vitals reviewed. Constitutional: She is oriented to person, place, and time. She appears well-developed and well-nourished. She is cooperative. No distress.    HENT:   Head: Normocephalic and a

## 2020-03-18 ENCOUNTER — TELEPHONE (OUTPATIENT)
Dept: FAMILY MEDICINE CLINIC | Facility: CLINIC | Age: 75
End: 2020-03-18

## 2020-03-18 PROCEDURE — G2012 BRIEF CHECK IN BY MD/QHP: HCPCS | Performed by: PHYSICIAN ASSISTANT

## 2020-03-18 RX ORDER — AZITHROMYCIN 250 MG/1
TABLET, FILM COATED ORAL
Qty: 6 TABLET | Refills: 0 | Status: SHIPPED | OUTPATIENT
Start: 2020-03-18 | End: 2020-03-30

## 2020-03-18 NOTE — TELEPHONE ENCOUNTER
Virtual/Telephone Check-In    Sheelalewis Alvarez verbally consents to a Virtual/Telephone Check-In service on 03/18/20. Patient understands and accepts financial responsibility for any deductible, co-insurance and/or co-pays associated with this service.     Dur

## 2020-03-18 NOTE — TELEPHONE ENCOUNTER
Pt came in to [facility]  complaining of upper respiratory, cough. Pt was advised to return home and she will be contacted by clinical staff. Phone # confirmed.

## 2020-03-22 ENCOUNTER — TELEPHONE (OUTPATIENT)
Dept: FAMILY MEDICINE CLINIC | Facility: CLINIC | Age: 75
End: 2020-03-22

## 2020-03-22 DIAGNOSIS — R05.3 PERSISTENT COUGH: ICD-10-CM

## 2020-03-22 PROCEDURE — G2012 BRIEF CHECK IN BY MD/QHP: HCPCS | Performed by: FAMILY MEDICINE

## 2020-03-22 RX ORDER — PREDNISONE 20 MG/1
TABLET ORAL
Qty: 12 TABLET | Refills: 0 | Status: SHIPPED | OUTPATIENT
Start: 2020-03-22 | End: 2020-03-30

## 2020-03-22 NOTE — TELEPHONE ENCOUNTER
Virtual/Telephone Check-In    Marino Almanzar verbally consents to a Virtual/Telephone Check-In service on 03/22/20. Patient understands and accepts financial responsibility for any deductible, co-insurance and/or co-pays associated with this service.     Dur

## 2020-03-30 ENCOUNTER — TELEPHONE (OUTPATIENT)
Dept: FAMILY MEDICINE CLINIC | Facility: CLINIC | Age: 75
End: 2020-03-30

## 2020-03-30 ENCOUNTER — MOBILE ENCOUNTER (OUTPATIENT)
Dept: FAMILY MEDICINE CLINIC | Facility: CLINIC | Age: 75
End: 2020-03-30

## 2020-03-30 DIAGNOSIS — J45.41 MODERATE PERSISTENT ASTHMA WITH ACUTE EXACERBATION: Primary | ICD-10-CM

## 2020-03-30 PROCEDURE — G2012 BRIEF CHECK IN BY MD/QHP: HCPCS | Performed by: FAMILY MEDICINE

## 2020-03-30 RX ORDER — ALBUTEROL SULFATE 90 UG/1
2 AEROSOL, METERED RESPIRATORY (INHALATION) EVERY 4 HOURS PRN
Qty: 1 INHALER | Refills: 0 | Status: SHIPPED | OUTPATIENT
Start: 2020-03-30 | End: 2021-01-14

## 2020-03-30 RX ORDER — PREDNISONE 20 MG/1
TABLET ORAL
Qty: 33 TABLET | Refills: 0 | Status: SHIPPED | OUTPATIENT
Start: 2020-03-30 | End: 2020-06-08

## 2020-03-30 RX ORDER — LEVALBUTEROL TARTRATE 45 UG/1
2 AEROSOL, METERED ORAL EVERY 4 HOURS PRN
Qty: 1 INHALER | Refills: 2 | Status: SHIPPED | OUTPATIENT
Start: 2020-03-30 | End: 2020-06-08

## 2020-03-30 NOTE — TELEPHONE ENCOUNTER
Pt contacted and already addressed by Dr Nixon Multani as per patient. Pt was prescribed medication by him and to call back if not better. - addressed by Dr. Nixon Multani.

## 2020-03-30 NOTE — TELEPHONE ENCOUNTER
Pharmacy states prior authorization is needed.      Levalbuterol Tartrate (XOPENEX HFA) 45 MCG/ACT Inhalation Aerosol

## 2020-03-30 NOTE — TELEPHONE ENCOUNTER
Pt calling back, stated she's not better, have been treated x 3 , last 3/22/20 via phone visit- continue to have dry cough, benzonatate not helping-6 pills left , coughed x 1 while on phone, continue to have chest tightness affecting her daily activity  Pt

## 2020-03-30 NOTE — PROGRESS NOTES
In effort to keep you safe and out of the office but still have access to medical care from home, your physician can do a phone visit with you.  We will bill your insurance but there is a chance you might have a co-pay, as you would if you came into the o

## 2020-04-02 ENCOUNTER — TELEPHONE (OUTPATIENT)
Dept: FAMILY MEDICINE CLINIC | Facility: CLINIC | Age: 75
End: 2020-04-02

## 2020-04-02 ENCOUNTER — PATIENT MESSAGE (OUTPATIENT)
Dept: FAMILY MEDICINE CLINIC | Facility: CLINIC | Age: 75
End: 2020-04-02

## 2020-04-02 DIAGNOSIS — E03.9 ACQUIRED HYPOTHYROIDISM: Primary | ICD-10-CM

## 2020-04-02 NOTE — TELEPHONE ENCOUNTER
Dr Deejay Kraft, All labs are normal  Please review Dexa scan so that I can call patient with results.    See below, thank you

## 2020-04-02 NOTE — TELEPHONE ENCOUNTER
Labs fine  dexa shows slight bone loss but no osteoporosis  Would have her continue on vit d supplement lifelong. Recheck dexa in 3 years.

## 2020-04-02 NOTE — TELEPHONE ENCOUNTER
Dr. Bobo Staples pt is calling to give you a update. Pt stated that she is feeling better with the treatment that you prescribed her. She will continue with the medication. Pt also wanted to know if you want to see her on April 6.  She has a appt or will it be

## 2020-04-02 NOTE — TELEPHONE ENCOUNTER
Pt called regarding appt for Monday 4/6. Pt wishes to cancel appt. Pt did stt she would like a call back with lab results and Dexa scan results she had done in Jan. Please advise.

## 2020-04-29 ENCOUNTER — HOSPITAL ENCOUNTER (EMERGENCY)
Facility: HOSPITAL | Age: 75
Discharge: HOME | End: 2020-04-29
Attending: EMERGENCY MEDICINE
Payer: MEDICARE

## 2020-04-29 ENCOUNTER — TELEPHONE (OUTPATIENT)
Dept: FAMILY MEDICINE CLINIC | Facility: CLINIC | Age: 75
End: 2020-04-29

## 2020-04-29 VITALS
DIASTOLIC BLOOD PRESSURE: 63 MMHG | RESPIRATION RATE: 11 BRPM | HEART RATE: 62 BPM | OXYGEN SATURATION: 98 % | SYSTOLIC BLOOD PRESSURE: 138 MMHG | TEMPERATURE: 98 F

## 2020-04-29 DIAGNOSIS — E87.6 HYPOKALEMIA: Primary | ICD-10-CM

## 2020-04-29 PROCEDURE — 80048 BASIC METABOLIC PNL TOTAL CA: CPT | Performed by: EMERGENCY MEDICINE

## 2020-04-29 PROCEDURE — 99284 EMERGENCY DEPT VISIT MOD MDM: CPT

## 2020-04-29 PROCEDURE — 84484 ASSAY OF TROPONIN QUANT: CPT | Performed by: EMERGENCY MEDICINE

## 2020-04-29 PROCEDURE — 81001 URINALYSIS AUTO W/SCOPE: CPT | Performed by: EMERGENCY MEDICINE

## 2020-04-29 PROCEDURE — 93005 ELECTROCARDIOGRAM TRACING: CPT

## 2020-04-29 PROCEDURE — 85025 COMPLETE CBC W/AUTO DIFF WBC: CPT | Performed by: EMERGENCY MEDICINE

## 2020-04-29 PROCEDURE — 36415 COLL VENOUS BLD VENIPUNCTURE: CPT

## 2020-04-29 PROCEDURE — 93010 ELECTROCARDIOGRAM REPORT: CPT | Performed by: EMERGENCY MEDICINE

## 2020-04-29 RX ORDER — POTASSIUM CHLORIDE 1.5 G/1.77G
40 POWDER, FOR SOLUTION ORAL ONCE
Status: COMPLETED | OUTPATIENT
Start: 2020-04-29 | End: 2020-04-29

## 2020-04-29 NOTE — ED PROVIDER NOTES
Patient Seen in: [facility] Emergency Department    History   Patient presents with:  Fatigue      HPI    Patient presents to the ED complaining of feeling fatigued tonight.   She states that she felt the same way several months ago when her potass to local anesthetic: No      ROS  Pertinent Positives: Fatigue, leg cramps  All other organ systems are reviewed and are negative. Constitutional and vital signs reviewed.       Social History and Family History elements reviewed from today, pertinent po CULTURE REFLEX - Abnormal; Notable for the following components:       Result Value    Clarity Urine Cloudy (*)     pH Urine 9.0 (*)     Protein Urine 30  (*)     All other components within normal limits   BASIC METABOLIC PANEL (8) - Abnormal; Notable for pertinent discharge summaries, testing, and procedures and reviewed those reports. Complicating Factors: The patient already has does not have any pertinent problems on file. to contribute to the complexity of this ED evaluation.     ED Course: Presents

## 2020-04-29 NOTE — ED INITIAL ASSESSMENT (HPI)
Pt c/o fatigue/ N/T to all four extremities, paired with mid sternal chest pressure since 1300. Pt states the last time she felt this was when her K level was low. Pt denies any other symptoms. AOx4.

## 2020-04-29 NOTE — TELEPHONE ENCOUNTER
Family called about patient complaint of severe fatigue, body ache. History of similar symptoms related to low potassium. No cough , fever or chest pain. No GI symptoms. Recommend prompt ED evaluation. She agreed.

## 2020-04-29 NOTE — TELEPHONE ENCOUNTER
Paging    Message #  2020 12:30a [[email or web address]]  To:  From: ABBY Vera MD:  Phone#:  ----------------------------------------------------------------------  Scott Olson [phone] RE PT HAN KUHN  49 FEELING  WEAK, FATIGUED PT OF

## 2020-04-30 ENCOUNTER — APPOINTMENT (OUTPATIENT)
Dept: LAB | Age: 75
End: 2020-04-30
Attending: FAMILY MEDICINE
Payer: MEDICARE

## 2020-04-30 DIAGNOSIS — E87.6 HYPOKALEMIA: ICD-10-CM

## 2020-04-30 PROCEDURE — 36415 COLL VENOUS BLD VENIPUNCTURE: CPT

## 2020-04-30 PROCEDURE — 80048 BASIC METABOLIC PNL TOTAL CA: CPT

## 2020-05-01 ENCOUNTER — TELEPHONE (OUTPATIENT)
Dept: FAMILY MEDICINE CLINIC | Facility: CLINIC | Age: 75
End: 2020-05-01

## 2020-05-01 NOTE — TELEPHONE ENCOUNTER
Patient called and advises she was in the [facility] ER Wednesday with Low Potassium (see encounter from 4/29). She advised she is having the same symptoms with her legs and feet cramping and she is worried.  Walking seems to help the cramps, but they fee

## 2020-05-01 NOTE — TELEPHONE ENCOUNTER
Spoke with the patient who reports she started having the leg and feet cramping again today 5/1/20. She reports the cramping subsides when she walks but then her toes get numb.  Patient reports they did give her potassium in the hospital on 4/29/20 and that

## 2020-05-04 ENCOUNTER — VIRTUAL PHONE E/M (OUTPATIENT)
Dept: FAMILY MEDICINE CLINIC | Facility: CLINIC | Age: 75
End: 2020-05-04
Payer: MEDICARE

## 2020-05-04 ENCOUNTER — TELEPHONE (OUTPATIENT)
Dept: NEPHROLOGY | Facility: CLINIC | Age: 75
End: 2020-05-04

## 2020-05-04 DIAGNOSIS — E87.6 HYPOKALEMIA: Primary | ICD-10-CM

## 2020-05-04 PROCEDURE — 99441 PHONE E/M BY PHYS 5-10 MIN: CPT | Performed by: FAMILY MEDICINE

## 2020-05-04 NOTE — TELEPHONE ENCOUNTER
Spoke to [place] patient's daughter. Consult appointment booked for Monday 5/11/2020 at 3:00 pm per Dr. Aram Fajardo.

## 2020-05-04 NOTE — TELEPHONE ENCOUNTER
Felipe Dias requesting Consult appointment with Dr Capri Louie regarding Hypokalemia. Please call. Thank you.

## 2020-05-04 NOTE — PROGRESS NOTES
Virtual Telephone Check-In    Maximus Robbins verbally consents to a Virtual/Telephone Check-In visit on 05/04/20. Patient understands and accepts financial responsibility for any deductible, co-insurance and/or co-pays associated with this service.     Dur

## 2020-05-11 ENCOUNTER — OFFICE VISIT (OUTPATIENT)
Dept: NEPHROLOGY | Facility: CLINIC | Age: 75
End: 2020-05-11
Payer: MEDICARE

## 2020-05-11 VITALS
DIASTOLIC BLOOD PRESSURE: 65 MMHG | HEART RATE: 73 BPM | BODY MASS INDEX: 23.89 KG/M2 | SYSTOLIC BLOOD PRESSURE: 128 MMHG | HEIGHT: 61.5 IN | WEIGHT: 128.19 LBS

## 2020-05-11 DIAGNOSIS — E87.6 HYPOKALEMIA: Primary | ICD-10-CM

## 2020-05-11 DIAGNOSIS — J44.9 ASTHMA WITH COPD (CHRONIC OBSTRUCTIVE PULMONARY DISEASE) (HCC): Chronic | ICD-10-CM

## 2020-05-11 PROCEDURE — 99203 OFFICE O/P NEW LOW 30 MIN: CPT | Performed by: INTERNAL MEDICINE

## 2020-05-11 NOTE — PATIENT INSTRUCTIONS
Oralia Sandoval you look great  No change in any medications    Around June 1 please do blood and urine tests no need to fast    See me back in the middle of June    Please stay safe and very nice to meet you

## 2020-05-20 ENCOUNTER — APPOINTMENT (OUTPATIENT)
Dept: LAB | Facility: HOSPITAL | Age: 75
End: 2020-05-20
Attending: INTERNAL MEDICINE
Payer: MEDICARE

## 2020-05-20 DIAGNOSIS — E87.6 HYPOKALEMIA: ICD-10-CM

## 2020-05-20 PROCEDURE — 81001 URINALYSIS AUTO W/SCOPE: CPT

## 2020-05-20 PROCEDURE — 82043 UR ALBUMIN QUANTITATIVE: CPT

## 2020-05-20 PROCEDURE — 80048 BASIC METABOLIC PNL TOTAL CA: CPT

## 2020-05-20 PROCEDURE — 36415 COLL VENOUS BLD VENIPUNCTURE: CPT

## 2020-05-20 PROCEDURE — 83735 ASSAY OF MAGNESIUM: CPT

## 2020-05-20 PROCEDURE — 84133 ASSAY OF URINE POTASSIUM: CPT

## 2020-05-20 PROCEDURE — 84300 ASSAY OF URINE SODIUM: CPT

## 2020-05-20 PROCEDURE — 82570 ASSAY OF URINE CREATININE: CPT

## 2020-05-21 RX ORDER — LEVOTHYROXINE SODIUM 0.07 MG/1
75 TABLET ORAL
Qty: 90 TABLET | Refills: 1 | Status: SHIPPED | OUTPATIENT
Start: 2020-05-21 | End: 2020-11-14

## 2020-05-21 NOTE — TELEPHONE ENCOUNTER
This is being sent to you without review by the Triage staff due to the high call volumes created by the COVID-19 virus, per the email sent by Dr. Kimberlyn Solorio on 3/19/20. Thank you for your support.     Centralized Nurse Triage Team

## 2020-06-08 ENCOUNTER — OFFICE VISIT (OUTPATIENT)
Dept: FAMILY MEDICINE CLINIC | Facility: CLINIC | Age: 75
End: 2020-06-08
Payer: MEDICARE

## 2020-06-08 VITALS
RESPIRATION RATE: 16 BRPM | HEART RATE: 74 BPM | DIASTOLIC BLOOD PRESSURE: 73 MMHG | HEIGHT: 61.5 IN | SYSTOLIC BLOOD PRESSURE: 134 MMHG | WEIGHT: 130.5 LBS | BODY MASS INDEX: 24.32 KG/M2

## 2020-06-08 DIAGNOSIS — J44.9 COPD WITH CHRONIC BRONCHITIS AND EMPHYSEMA (HCC): ICD-10-CM

## 2020-06-08 DIAGNOSIS — J44.9 ASTHMA WITH COPD (CHRONIC OBSTRUCTIVE PULMONARY DISEASE) (HCC): Primary | ICD-10-CM

## 2020-06-08 DIAGNOSIS — R91.1 NODULE OF LEFT LUNG: ICD-10-CM

## 2020-06-08 PROCEDURE — 99213 OFFICE O/P EST LOW 20 MIN: CPT | Performed by: FAMILY MEDICINE

## 2020-06-08 NOTE — PATIENT INSTRUCTIONS
Nódulo pulmonar  Un nódulo pulmonar es niko pequeña área de tejido anormal en el pulmón. Generalmente se encuentra en niko radiografía tomada por otras razones.  Es [facility] lesión [place] de hasta niko pulgada de [place], rodeada por tejido pulmonar normal. Fumar cigarrillos sigue siendo bill de los factores de riesgo principales para el cáncer de pulmón. Si usted fuma, es esencial que lo deje para disminuir pardo riesgo de cáncer de pulmón.  Hable con pardo proveedor de [employer] las distintas Brett qu A usted le arellano diagnosticado enfermedad pulmonar obstructiva crónica (EPOC). Mag es el nombre que recibe un tigre de enfermedades que limitan el flujo de aire hacia y desde munira pulmones, lo cual dificulta mucho la respiración.  Si usted tiene EPOC, es [address] [facility] · [place] medicamentos exactamente guille le indiquen, sin saltarse ninguna dosis. Maneje pardo estrés  · El estrés podría empeorar pardo EPOC. Utilice esta técnica para el manejo del estrés:  1.  Busque un lugar tranquilo y siéntese o acuéstese en niko posición c

## 2020-06-12 RX ORDER — MONTELUKAST SODIUM 10 MG/1
10 TABLET ORAL NIGHTLY
Qty: 90 TABLET | Refills: 0 | Status: SHIPPED | OUTPATIENT
Start: 2020-06-12 | End: 2020-09-16 | Stop reason: ALTCHOICE

## 2020-06-15 ENCOUNTER — OFFICE VISIT (OUTPATIENT)
Dept: NEPHROLOGY | Facility: CLINIC | Age: 75
End: 2020-06-15
Payer: MEDICARE

## 2020-06-15 VITALS
BODY MASS INDEX: 24.6 KG/M2 | HEIGHT: 61.5 IN | TEMPERATURE: 98 F | WEIGHT: 132 LBS | SYSTOLIC BLOOD PRESSURE: 128 MMHG | RESPIRATION RATE: 18 BRPM | DIASTOLIC BLOOD PRESSURE: 62 MMHG | HEART RATE: 72 BPM

## 2020-06-15 DIAGNOSIS — E87.6 HYPOKALEMIA: Primary | ICD-10-CM

## 2020-06-15 DIAGNOSIS — I10 ESSENTIAL HYPERTENSION: ICD-10-CM

## 2020-06-15 DIAGNOSIS — J44.9 ASTHMA WITH COPD (CHRONIC OBSTRUCTIVE PULMONARY DISEASE) (HCC): Chronic | ICD-10-CM

## 2020-06-15 PROCEDURE — 99213 OFFICE O/P EST LOW 20 MIN: CPT | Performed by: INTERNAL MEDICINE

## 2020-06-15 NOTE — PATIENT INSTRUCTIONS
Great job Mary    Have a good summer    Make sure you are having high potassium foods such as avocado melon tomatoes and bananas and oranges    Stay well no need to follow-up with me unless any problems  Thank you for coming in and I hope your 's o

## 2020-06-16 ENCOUNTER — HOSPITAL ENCOUNTER (OUTPATIENT)
Dept: CT IMAGING | Age: 75
Discharge: HOME | End: 2020-06-16
Attending: FAMILY MEDICINE
Payer: MEDICARE

## 2020-06-16 ENCOUNTER — LAB ENCOUNTER (OUTPATIENT)
Dept: LAB | Facility: HOSPITAL | Age: 75
End: 2020-06-16
Attending: FAMILY MEDICINE
Payer: MEDICARE

## 2020-06-16 DIAGNOSIS — R91.1 NODULE OF LEFT LUNG: ICD-10-CM

## 2020-06-16 DIAGNOSIS — J44.9 ASTHMA WITH COPD (CHRONIC OBSTRUCTIVE PULMONARY DISEASE) (HCC): ICD-10-CM

## 2020-06-16 PROCEDURE — 71260 CT THORAX DX C+: CPT | Performed by: FAMILY MEDICINE

## 2020-06-16 NOTE — PROGRESS NOTES
Dear Shantel De Souza   thanks for the referral of Ravin Dumont  She is a peter woman of [place] descent who is also from [place]. She came to the [address] around [address] she has been in the ER twice recently for low potassium levels.   Patient has a history of asthma osteoporos

## 2020-06-16 NOTE — PROGRESS NOTES
Dear Halie Valencia     Orvel Degree    Is here for follow-up she saw me a month ago for 2 episodes of hypokalemia. She had been on diuretics in the past but is no longer. She has a history of hypothyroidism and asthma.   She is currently doing w needed for Wheezing.  1 Inhaler 0   • Cholecalciferol (VITAMIN D) 50 MCG (2000 UT) Oral Tab TAKE ONE TABLET BY MOUTH ONE TIME DAILY 90 tablet 11   • ROSUVASTATIN CALCIUM 40 MG Oral Tab TAKE ONE TABLET BY MOUTH IN THE EVENING  90 tablet 1       Allergies: edema  Neurological:  Grossly normal       ASSESSMENT/PLAN:   Assessment   Hypokalemia  (primary encounter diagnosis)  Essential hypertension  Asthma with copd (chronic obstructive pulmonary disease) (hcc)    I did a full renal work-up on her  Knees and is

## 2020-06-18 ENCOUNTER — TELEPHONE (OUTPATIENT)
Dept: RESPIRATORY THERAPY | Facility: HOSPITAL | Age: 75
End: 2020-06-18

## 2020-06-18 DIAGNOSIS — E78.2 MIXED HYPERLIPIDEMIA: ICD-10-CM

## 2020-06-18 RX ORDER — ROSUVASTATIN CALCIUM 40 MG/1
40 TABLET, COATED ORAL EVERY EVENING
Qty: 90 TABLET | Refills: 1 | Status: SHIPPED | OUTPATIENT
Start: 2020-06-18 | End: 2021-03-09

## 2020-06-18 NOTE — PFT
Patient was called and informed that Pulmonary Function test was cancelled, will call her when equipment is repaired.

## 2020-06-22 ENCOUNTER — HOSPITAL ENCOUNTER (OUTPATIENT)
Dept: MAMMOGRAPHY | Age: 75
Discharge: HOME | End: 2020-06-22
Attending: FAMILY MEDICINE
Payer: MEDICARE

## 2020-06-22 DIAGNOSIS — Z12.31 VISIT FOR SCREENING MAMMOGRAM: ICD-10-CM

## 2020-06-22 PROCEDURE — 77067 SCR MAMMO BI INCL CAD: CPT | Performed by: FAMILY MEDICINE

## 2020-06-22 PROCEDURE — 77063 BREAST TOMOSYNTHESIS BI: CPT | Performed by: FAMILY MEDICINE

## 2020-06-23 NOTE — TELEPHONE ENCOUNTER
This is being sent to you without review by the Triage staff due to the high call volumes created by the COVID-19 virus, per the email sent by Dr. Martin Hunt on 3/19/20. Thank you for your support.     Centralized Nurse Triage Team

## 2020-06-29 ENCOUNTER — ORDER TRANSCRIPTION (OUTPATIENT)
Dept: ADMINISTRATIVE | Facility: HOSPITAL | Age: 75
End: 2020-06-29

## 2020-06-29 DIAGNOSIS — Z01.818 PRE-PROCEDURAL EXAMINATION: Primary | ICD-10-CM

## 2020-06-29 DIAGNOSIS — Z11.59 SPECIAL SCREENING EXAMINATION FOR UNSPECIFIED VIRAL DISEASE: ICD-10-CM

## 2020-07-03 ENCOUNTER — LAB ENCOUNTER (OUTPATIENT)
Dept: LAB | Facility: HOSPITAL | Age: 75
End: 2020-07-03
Attending: FAMILY MEDICINE
Payer: MEDICARE

## 2020-07-03 DIAGNOSIS — Z01.818 PRE-PROCEDURAL EXAMINATION: ICD-10-CM

## 2020-07-03 DIAGNOSIS — Z11.59 SPECIAL SCREENING EXAMINATION FOR UNSPECIFIED VIRAL DISEASE: ICD-10-CM

## 2020-07-04 LAB — SARS-COV-2 RNA RESP QL NAA+PROBE: NOT DETECTED

## 2020-07-06 ENCOUNTER — HOSPITAL ENCOUNTER (OUTPATIENT)
Dept: RESPIRATORY THERAPY | Facility: HOSPITAL | Age: 75
Discharge: HOME | End: 2020-07-06
Attending: FAMILY MEDICINE
Payer: MEDICARE

## 2020-07-06 DIAGNOSIS — J44.9 ASTHMA WITH COPD (CHRONIC OBSTRUCTIVE PULMONARY DISEASE) (HCC): ICD-10-CM

## 2020-07-06 PROCEDURE — 94726 PLETHYSMOGRAPHY LUNG VOLUMES: CPT | Performed by: INTERNAL MEDICINE

## 2020-07-06 PROCEDURE — 94729 DIFFUSING CAPACITY: CPT | Performed by: INTERNAL MEDICINE

## 2020-07-06 PROCEDURE — 94060 EVALUATION OF WHEEZING: CPT | Performed by: INTERNAL MEDICINE

## 2020-07-08 ENCOUNTER — OFFICE VISIT (OUTPATIENT)
Dept: FAMILY MEDICINE CLINIC | Facility: CLINIC | Age: 75
End: 2020-07-08
Payer: MEDICARE

## 2020-07-08 VITALS
HEART RATE: 64 BPM | SYSTOLIC BLOOD PRESSURE: 116 MMHG | DIASTOLIC BLOOD PRESSURE: 72 MMHG | RESPIRATION RATE: 18 BRPM | BODY MASS INDEX: 24.44 KG/M2 | HEIGHT: 61.5 IN | WEIGHT: 131.13 LBS

## 2020-07-08 DIAGNOSIS — J44.9 COPD WITH CHRONIC BRONCHITIS AND EMPHYSEMA (HCC): Primary | ICD-10-CM

## 2020-07-08 DIAGNOSIS — F41.9 ANXIETY: ICD-10-CM

## 2020-07-08 PROCEDURE — 99213 OFFICE O/P EST LOW 20 MIN: CPT | Performed by: FAMILY MEDICINE

## 2020-07-08 NOTE — PROGRESS NOTES
Shauna Francis is a 70year old female.   HPI:   To discuss results, patient states that she completed all the evaluation as recommended, saw nephrologist and is a stable, she has been analyzing her symptoms and she has noticed that her chest tightness is onl Negative. Psychiatric/Behavioral: The patient is nervous/anxious. EXAM:     Physical Exam  Constitutional:       General: She is not in acute distress. Appearance: Normal appearance. She is normal weight. She is not ill-appearing.    Cardio

## 2020-07-13 NOTE — ADDENDUM NOTE
Encounter addended by: German Leiva MD on: 7/13/2020 4:18 PM   Actions taken: Clinical Note Signed, Charge Capture section accepted

## 2020-07-13 NOTE — PROCEDURES
Carson Hou     1945 MRN [id number]   Height  61ln Age 70year old   Weight  130 lbs  Sex Female         Spirometry:   FEV1 1.95 L which is 94%  FEV1/FVC 72%  No significant bronchodilator resp

## 2020-08-08 ENCOUNTER — MED REC SCAN ONLY (OUTPATIENT)
Dept: FAMILY MEDICINE CLINIC | Facility: CLINIC | Age: 75
End: 2020-08-08

## 2020-08-26 ENCOUNTER — TELEPHONE (OUTPATIENT)
Dept: CASE MANAGEMENT | Age: 75
End: 2020-08-26

## 2020-08-26 NOTE — TELEPHONE ENCOUNTER
Patient is eligible for a 2020 Medicare Advantage Supervisit. Left message to call back [phone].

## 2020-08-28 ENCOUNTER — TELEMEDICINE (OUTPATIENT)
Dept: FAMILY MEDICINE CLINIC | Facility: CLINIC | Age: 75
End: 2020-08-28
Payer: MEDICARE

## 2020-08-28 ENCOUNTER — NURSE TRIAGE (OUTPATIENT)
Dept: FAMILY MEDICINE CLINIC | Facility: CLINIC | Age: 75
End: 2020-08-28

## 2020-08-28 ENCOUNTER — TELEPHONE (OUTPATIENT)
Dept: FAMILY MEDICINE CLINIC | Facility: CLINIC | Age: 75
End: 2020-08-28

## 2020-08-28 DIAGNOSIS — K64.4 EXTERNAL HEMORRHOID: Primary | ICD-10-CM

## 2020-08-28 PROCEDURE — 99441 PHONE E/M BY PHYS 5-10 MIN: CPT | Performed by: FAMILY MEDICINE

## 2020-08-28 RX ORDER — DOCUSATE SODIUM 100 MG/1
100 CAPSULE, LIQUID FILLED ORAL 2 TIMES DAILY
Qty: 60 CAPSULE | Refills: 0 | Status: SHIPPED | OUTPATIENT
Start: 2020-08-28 | End: 2020-09-16 | Stop reason: ALTCHOICE

## 2020-08-28 NOTE — PROGRESS NOTES
Barbara Prieto verbally consents to a Virtual/Telephone Check-In service on 08/28/20. Duration of Service:  10 minutes    Patient has been referred to the [facility] website at [email or web address] to review the yearly Consent to Treat document.     Patient Sulfate HFA (VENTOLIN HFA) 108 (90 Base) MCG/ACT Inhalation Aero Soln Inhale 2 puffs into the lungs every 4 (four) hours as needed for Wheezing.  1 Inhaler 0   • Cholecalciferol (VITAMIN D) 50 MCG (2000 UT) Oral Tab TAKE ONE TABLET BY MOUTH ONE TIME DAILY 9

## 2020-08-28 NOTE — TELEPHONE ENCOUNTER
Pt states the pharmacy informed her that the suppository Rx that was sent today by Dr Inna Brown is not covered by insurance. Was not informed of what might be covered.     Called [facility] and verified  Hydrocortisone acetate suppositories are not covered and p

## 2020-08-28 NOTE — TELEPHONE ENCOUNTER
Spoke with pt,  verified  Pt informed of MD recommendation, pt stated understanding. Virtual appt made.         Future Appointments   Date Time Provider Golden Marie   2020 11:45 AM Noel Burns MD [facility] Letitia Stack

## 2020-08-29 RX ORDER — HYDROCORTISONE 10 MG/G
1 CREAM TOPICAL 2 TIMES DAILY PRN
Qty: 28.4 G | Refills: 0 | Status: SHIPPED | OUTPATIENT
Start: 2020-08-29 | End: 2021-08-24

## 2020-09-16 ENCOUNTER — OFFICE VISIT (OUTPATIENT)
Dept: FAMILY MEDICINE CLINIC | Facility: CLINIC | Age: 75
End: 2020-09-16
Payer: MEDICARE

## 2020-09-16 VITALS
WEIGHT: 131 LBS | HEART RATE: 67 BPM | DIASTOLIC BLOOD PRESSURE: 61 MMHG | SYSTOLIC BLOOD PRESSURE: 105 MMHG | HEIGHT: 61.5 IN | BODY MASS INDEX: 24.42 KG/M2

## 2020-09-16 DIAGNOSIS — E87.6 HYPOKALEMIA: ICD-10-CM

## 2020-09-16 DIAGNOSIS — J45.40 MODERATE PERSISTENT ASTHMA WITHOUT COMPLICATION: Primary | ICD-10-CM

## 2020-09-16 DIAGNOSIS — Z23 NEEDS FLU SHOT: ICD-10-CM

## 2020-09-16 DIAGNOSIS — R25.2 LEG CRAMP: ICD-10-CM

## 2020-09-16 PROBLEM — J44.9 ASTHMA WITH COPD (CHRONIC OBSTRUCTIVE PULMONARY DISEASE) (HCC): Chronic | Status: RESOLVED | Noted: 2017-05-16 | Resolved: 2020-09-16

## 2020-09-16 PROBLEM — J44.9 ASTHMA WITH COPD (CHRONIC OBSTRUCTIVE PULMONARY DISEASE): Chronic | Status: RESOLVED | Noted: 2017-05-16 | Resolved: 2020-09-16

## 2020-09-16 PROBLEM — J44.89 ASTHMA WITH COPD (CHRONIC OBSTRUCTIVE PULMONARY DISEASE): Chronic | Status: RESOLVED | Noted: 2017-05-16 | Resolved: 2020-09-16

## 2020-09-16 PROBLEM — J44.89 ASTHMA WITH COPD (CHRONIC OBSTRUCTIVE PULMONARY DISEASE) (HCC): Chronic | Status: RESOLVED | Noted: 2017-05-16 | Resolved: 2020-09-16

## 2020-09-16 PROCEDURE — 3074F SYST BP LT 130 MM HG: CPT | Performed by: FAMILY MEDICINE

## 2020-09-16 PROCEDURE — 3008F BODY MASS INDEX DOCD: CPT | Performed by: FAMILY MEDICINE

## 2020-09-16 PROCEDURE — 90662 IIV NO PRSV INCREASED AG IM: CPT | Performed by: FAMILY MEDICINE

## 2020-09-16 PROCEDURE — 3078F DIAST BP <80 MM HG: CPT | Performed by: FAMILY MEDICINE

## 2020-09-16 PROCEDURE — 99213 OFFICE O/P EST LOW 20 MIN: CPT | Performed by: FAMILY MEDICINE

## 2020-09-16 PROCEDURE — G0008 ADMIN INFLUENZA VIRUS VAC: HCPCS | Performed by: FAMILY MEDICINE

## 2020-09-16 NOTE — PATIENT INSTRUCTIONS
[employer] En Las Piernas [Leg Cramps]  Un calambre o espasmo muscular es niko margo contracción de las fibras de un músculo. Mag problema puede presentarse en los pies, las pantorrillas o los muslos por la noche, cuando las piernas están [place].  Max Mina · Si el dolor es intenso, estirar el músculo que tiene el espasmo podría aliviarlo rápidamente. · Si el espasmo le [employer], es posible que se le flexionen los dedos hacia arriba o [place].  Para estirar el músculo que tiene el espasmo, doble los

## 2020-09-16 NOTE — PROGRESS NOTES
Zena Perdomo is a 70year old female.   HPI:   Patient here for follow up,  She has been doing better since last visit, hemorrhoids resolved with treatment, she also states chest tightness and SOB have resolved, patient has continued daily use of Breo and l History:  Social History    Tobacco Use      Smoking status: Never Smoker      Smokeless tobacco: Never Used    Alcohol use: No      Alcohol/week: 0.0 standard drinks    Drug use: No       REVIEW OF SYSTEMS:   Review of Systems   Constitutional: Negative. to stretching, printed information provided, focus on increasing water intake, may start magnesium supplementation       The patient indicates understanding of these issues and agrees to the plan. The patient is asked to return in 4 m.

## 2020-09-17 ENCOUNTER — TELEPHONE (OUTPATIENT)
Dept: CASE MANAGEMENT | Age: 75
End: 2020-09-17

## 2020-09-17 DIAGNOSIS — H52.7 REFRACTION DISORDER: ICD-10-CM

## 2020-09-17 DIAGNOSIS — I10 ESSENTIAL HYPERTENSION: Primary | ICD-10-CM

## 2020-09-17 NOTE — TELEPHONE ENCOUNTER
Modesto Mejia needs a referral to see Dr. Bhanu Arana for her yearly check up and tearing eyes.     If you agree please sign referral.    Thank you  Everardo Albarado

## 2020-09-18 ENCOUNTER — LAB ENCOUNTER (OUTPATIENT)
Dept: LAB | Age: 75
End: 2020-09-18
Attending: FAMILY MEDICINE
Payer: MEDICARE

## 2020-09-18 DIAGNOSIS — E87.6 HYPOKALEMIA: ICD-10-CM

## 2020-09-18 LAB
ANION GAP SERPL CALC-SCNC: 3 MMOL/L (ref 0–18)
BUN BLD-MCNC: 8 MG/DL (ref 7–18)
BUN/CREAT SERPL: 10.5 (ref 10–20)
CALCIUM BLD-MCNC: 9.5 MG/DL (ref 8.5–10.1)
CHLORIDE SERPL-SCNC: 107 MMOL/L (ref 98–112)
CO2 SERPL-SCNC: 31 MMOL/L (ref 21–32)
CREAT BLD-MCNC: 0.76 MG/DL (ref 0.55–1.02)
GLUCOSE BLD-MCNC: 89 MG/DL (ref 70–99)
OSMOLALITY SERPL CALC.SUM OF ELEC: 290 MOSM/KG (ref 275–295)
PATIENT FASTING Y/N/NP: YES
POTASSIUM SERPL-SCNC: 4.2 MMOL/L (ref 3.5–5.1)
SODIUM SERPL-SCNC: 141 MMOL/L (ref 136–145)

## 2020-09-18 PROCEDURE — 80048 BASIC METABOLIC PNL TOTAL CA: CPT

## 2020-09-18 PROCEDURE — 36415 COLL VENOUS BLD VENIPUNCTURE: CPT

## 2020-09-22 ENCOUNTER — TELEPHONE (OUTPATIENT)
Dept: CASE MANAGEMENT | Age: 75
End: 2020-09-22

## 2020-09-22 NOTE — TELEPHONE ENCOUNTER
Patient is eligible for a 2020 Medicare Annual Wellness visit in December. Discussed in detail w/patient. Appt scheduled for 12/2/20.

## 2020-10-08 NOTE — TELEPHONE ENCOUNTER
[facility]. I faxed the referral to .

## 2020-10-27 ENCOUNTER — OFFICE VISIT (OUTPATIENT)
Dept: FAMILY MEDICINE CLINIC | Facility: CLINIC | Age: 75
End: 2020-10-27
Payer: MEDICARE

## 2020-10-27 VITALS
DIASTOLIC BLOOD PRESSURE: 77 MMHG | HEART RATE: 69 BPM | BODY MASS INDEX: 24 KG/M2 | SYSTOLIC BLOOD PRESSURE: 150 MMHG | WEIGHT: 130 LBS

## 2020-10-27 DIAGNOSIS — Z76.89 ENCOUNTER TO ESTABLISH CARE: ICD-10-CM

## 2020-10-27 DIAGNOSIS — I10 ESSENTIAL HYPERTENSION: Primary | ICD-10-CM

## 2020-10-27 DIAGNOSIS — J44.9 ASTHMA WITH COPD (CHRONIC OBSTRUCTIVE PULMONARY DISEASE) (HCC): ICD-10-CM

## 2020-10-27 DIAGNOSIS — E87.6 HYPOKALEMIA: ICD-10-CM

## 2020-10-27 PROBLEM — J44.89 ASTHMA WITH COPD (CHRONIC OBSTRUCTIVE PULMONARY DISEASE) (HCC): Chronic | Status: ACTIVE | Noted: 2020-10-27

## 2020-10-27 PROBLEM — J44.89 ASTHMA WITH COPD (CHRONIC OBSTRUCTIVE PULMONARY DISEASE): Chronic | Status: ACTIVE | Noted: 2020-10-27

## 2020-10-27 PROCEDURE — 3077F SYST BP >= 140 MM HG: CPT | Performed by: FAMILY MEDICINE

## 2020-10-27 PROCEDURE — 99213 OFFICE O/P EST LOW 20 MIN: CPT | Performed by: FAMILY MEDICINE

## 2020-10-27 PROCEDURE — 3078F DIAST BP <80 MM HG: CPT | Performed by: FAMILY MEDICINE

## 2020-10-27 NOTE — PROGRESS NOTES
HPI:   Barbara Ho is a 70year old female who presents for a follow up/est care. Patient has a history of acquired hypothyroidism, hyperlipidemia, essential hypertension, hypokalemia, osteopenia, asthma with questionable COPD.     Reports that she has ha lungs every 4 (four) hours as needed for Wheezing.  1 Inhaler 0   • Cholecalciferol (VITAMIN D) 50 MCG (2000 UT) Oral Tab TAKE ONE TABLET BY MOUTH ONE TIME DAILY 90 tablet 11      Past Medical History:   Diagnosis Date   • Age-related nuclear cataract of thom murmur    ASSESSMENT AND PLAN:     1. Essential hypertension  -Blood pressure was 150/77 upon retake. Recent blood pressures from previous office visit reviewed and were normal. CTM. 2. Hypokalemia  - CTM. Recent CMP reviewed and normal    3.  Asthma

## 2020-11-06 ENCOUNTER — TELEPHONE (OUTPATIENT)
Dept: CASE MANAGEMENT | Age: 75
End: 2020-11-06

## 2020-11-06 NOTE — TELEPHONE ENCOUNTER
Patient is eligible for a 2020 Medicare Annual Wellness visit. Left message to call back [phone].

## 2020-11-09 ENCOUNTER — TELEPHONE (OUTPATIENT)
Dept: CASE MANAGEMENT | Age: 75
End: 2020-11-09

## 2020-11-09 NOTE — TELEPHONE ENCOUNTER
Reached patient for medication adherence consult. Patient is past due for refill on rosuvastatin. She states her  past away 2 months ago and he was on the same medication and same dose.  She has been using up his rosuvastatin 40 mg tablets so has

## 2020-11-13 ENCOUNTER — OFFICE VISIT (OUTPATIENT)
Dept: FAMILY MEDICINE CLINIC | Facility: CLINIC | Age: 75
End: 2020-11-13
Payer: MEDICARE

## 2020-11-13 VITALS
SYSTOLIC BLOOD PRESSURE: 146 MMHG | BODY MASS INDEX: 24.6 KG/M2 | HEART RATE: 73 BPM | HEIGHT: 61.5 IN | WEIGHT: 132 LBS | TEMPERATURE: 98 F | DIASTOLIC BLOOD PRESSURE: 82 MMHG

## 2020-11-13 DIAGNOSIS — E78.2 MIXED HYPERLIPIDEMIA: ICD-10-CM

## 2020-11-13 DIAGNOSIS — R91.1 NODULE OF LEFT LUNG: ICD-10-CM

## 2020-11-13 DIAGNOSIS — M85.80 OSTEOPENIA, UNSPECIFIED LOCATION: ICD-10-CM

## 2020-11-13 DIAGNOSIS — K29.50 CHRONIC GASTRITIS WITHOUT BLEEDING, UNSPECIFIED GASTRITIS TYPE: ICD-10-CM

## 2020-11-13 DIAGNOSIS — K21.9 GASTROESOPHAGEAL REFLUX DISEASE, UNSPECIFIED WHETHER ESOPHAGITIS PRESENT: ICD-10-CM

## 2020-11-13 DIAGNOSIS — E03.9 ACQUIRED HYPOTHYROIDISM: ICD-10-CM

## 2020-11-13 DIAGNOSIS — J44.9 ASTHMA WITH COPD (CHRONIC OBSTRUCTIVE PULMONARY DISEASE) (HCC): Chronic | ICD-10-CM

## 2020-11-13 DIAGNOSIS — F32.9 REACTIVE DEPRESSION: ICD-10-CM

## 2020-11-13 DIAGNOSIS — E87.6 HYPOKALEMIA: ICD-10-CM

## 2020-11-13 DIAGNOSIS — Z00.00 ENCOUNTER FOR ANNUAL HEALTH EXAMINATION: Primary | ICD-10-CM

## 2020-11-13 DIAGNOSIS — K22.2 SCHATZKI'S RING: ICD-10-CM

## 2020-11-13 DIAGNOSIS — I70.0 ATHEROSCLEROSIS OF AORTA (HCC): ICD-10-CM

## 2020-11-13 DIAGNOSIS — K44.9 HIATAL HERNIA: ICD-10-CM

## 2020-11-13 DIAGNOSIS — I10 ESSENTIAL HYPERTENSION: ICD-10-CM

## 2020-11-13 DIAGNOSIS — Z13.1 SCREENING FOR DIABETES MELLITUS (DM): ICD-10-CM

## 2020-11-13 DIAGNOSIS — L23.1 ALLERGIC CONTACT DERMATITIS DUE TO ADHESIVES: ICD-10-CM

## 2020-11-13 DIAGNOSIS — Z13.6 SCREENING FOR CARDIOVASCULAR CONDITION: ICD-10-CM

## 2020-11-13 DIAGNOSIS — K42.9 UMBILICAL HERNIA WITHOUT OBSTRUCTION AND WITHOUT GANGRENE: ICD-10-CM

## 2020-11-13 DIAGNOSIS — Z78.0 POSTMENOPAUSAL: ICD-10-CM

## 2020-11-13 PROBLEM — R06.2 WHEEZING: Status: RESOLVED | Noted: 2020-03-11 | Resolved: 2020-11-13

## 2020-11-13 PROBLEM — R42 DIZZINESS: Status: RESOLVED | Noted: 2019-09-16 | Resolved: 2020-11-13

## 2020-11-13 PROBLEM — R53.1 WEAKNESS GENERALIZED: Status: RESOLVED | Noted: 2019-09-19 | Resolved: 2020-11-13

## 2020-11-13 PROBLEM — R05.9 COUGH: Status: RESOLVED | Noted: 2020-03-11 | Resolved: 2020-11-13

## 2020-11-13 PROCEDURE — 3008F BODY MASS INDEX DOCD: CPT | Performed by: FAMILY MEDICINE

## 2020-11-13 PROCEDURE — 99284 EMERGENCY DEPT VISIT MOD MDM: CPT

## 2020-11-13 PROCEDURE — 3077F SYST BP >= 140 MM HG: CPT | Performed by: FAMILY MEDICINE

## 2020-11-13 PROCEDURE — 36415 COLL VENOUS BLD VENIPUNCTURE: CPT

## 2020-11-13 PROCEDURE — 96160 PT-FOCUSED HLTH RISK ASSMT: CPT | Performed by: FAMILY MEDICINE

## 2020-11-13 PROCEDURE — 93005 ELECTROCARDIOGRAM TRACING: CPT

## 2020-11-13 PROCEDURE — 3079F DIAST BP 80-89 MM HG: CPT | Performed by: FAMILY MEDICINE

## 2020-11-13 PROCEDURE — 93010 ELECTROCARDIOGRAM REPORT: CPT | Performed by: EMERGENCY MEDICINE

## 2020-11-13 PROCEDURE — 99397 PER PM REEVAL EST PAT 65+ YR: CPT | Performed by: FAMILY MEDICINE

## 2020-11-13 PROCEDURE — G0439 PPPS, SUBSEQ VISIT: HCPCS | Performed by: FAMILY MEDICINE

## 2020-11-13 RX ORDER — LISINOPRIL 10 MG/1
10 TABLET ORAL DAILY
Qty: 90 TABLET | Refills: 0 | Status: SHIPPED | OUTPATIENT
Start: 2020-11-13 | End: 2021-01-14

## 2020-11-13 NOTE — PATIENT INSTRUCTIONS
Mary Deea's SCREENING SCHEDULE   Tests on this list are recommended by your physician but may not be covered, or covered at this frequency, by your insurer. Please check with your insurance carrier before scheduling to verify coverage.    PREVENTATIVE Covered every 10 years- more often if abnormal Colonoscopy due on 06/01/2023 Update Health Maintenance if applicable    Flex Sigmoidoscopy Screen  Covered every 5 years No results found for this or any previous visit. No flowsheet data found.      Fecal O Once after 65 Orders placed or performed in visit on 04/05/16   • PNEUMOCOCCAL VACC, 13 ISAMAR IM    Please get once after your 65th birthday    Pneumococcal 23 (Pneumovax)  Covered Once after 65 Orders placed or performed in visit on 04/11/17   • Daisy Winn

## 2020-11-13 NOTE — PROGRESS NOTES
HPI:   Wilmer Vazquez is a 70year old female who presents for a Medicare Subsequent Annual Wellness visit (Pt already had Initial Annual Wellness).     Patient has been checking her blood pressure with her home monitor and has noted blood pressures approxi Last 3 Encounters:  11/13/20 : 132 lb (59.9 kg)  10/27/20 : 130 lb (59 kg)  09/16/20 : 131 lb (59.4 kg)     Last Cholesterol Labs:   Lab Results   Component Value Date    CHOLEST 150 01/07/2020    HDL 52 01/07/2020    LDL 74 01/07/2020    TRIG 120 01/07/20 6/12/17). Her family history includes Heart Disorder in an other family member; Lipids in an other family member. SOCIAL HISTORY:   She  reports that she has never smoked.  She has never used smokeless tobacco. She reports that she does not drink alcoh me they think I may have hearing loss: No               General appearance: alert  HEENT: Normocephalic, without obvious abnormality, atraumatic. PERRL, EOMI, pink conjunctiva.   Neck: supple, symmetrical, trachea midline, no adenopathy, no JVD and thyroid pulmonary disease) ([facility] [address]  - cont inhalers  Nodule of left lung  - routine surveillance    Acquired hypothyroidism  -     TSH W REFLEX TO FREE T4; Future    Hypokalemia  -     COMP METABOLIC PANEL (14);  Future   - stared lisinopril for HTN  Reactive depress Cancer Screening      Colonoscopy Screen every 10 years Colonoscopy due on 06/01/2023 Update [facility] if applicable    Flex Sigmoidoscopy Screen every 10 years No results found for this or any previous visit. No flowsheet data found.      Fecal Oc Medically needed    Zoster  Not covered by Medicare Part B No vaccine history found This may be covered with your pharmacy  prescription benefits      [address] [facility] Internal Lab or Procedure External Lab or Procedure      Annual Monitoring of

## 2020-11-14 ENCOUNTER — APPOINTMENT (OUTPATIENT)
Dept: CT IMAGING | Facility: HOSPITAL | Age: 75
End: 2020-11-14
Attending: EMERGENCY MEDICINE
Payer: MEDICARE

## 2020-11-14 ENCOUNTER — APPOINTMENT (OUTPATIENT)
Dept: GENERAL RADIOLOGY | Facility: HOSPITAL | Age: 75
End: 2020-11-14
Attending: EMERGENCY MEDICINE
Payer: MEDICARE

## 2020-11-14 ENCOUNTER — HOSPITAL ENCOUNTER (EMERGENCY)
Facility: HOSPITAL | Age: 75
Discharge: HOME | End: 2020-11-14
Attending: EMERGENCY MEDICINE
Payer: MEDICARE

## 2020-11-14 VITALS
RESPIRATION RATE: 15 BRPM | HEIGHT: 63 IN | TEMPERATURE: 99 F | WEIGHT: 132 LBS | BODY MASS INDEX: 23.39 KG/M2 | HEART RATE: 58 BPM | SYSTOLIC BLOOD PRESSURE: 115 MMHG | OXYGEN SATURATION: 97 % | DIASTOLIC BLOOD PRESSURE: 72 MMHG

## 2020-11-14 DIAGNOSIS — M54.9 MID BACK PAIN: Primary | ICD-10-CM

## 2020-11-14 DIAGNOSIS — I10 HYPERTENSION, UNSPECIFIED TYPE: ICD-10-CM

## 2020-11-14 DIAGNOSIS — J45.40 MODERATE PERSISTENT ASTHMA WITHOUT COMPLICATION: ICD-10-CM

## 2020-11-14 PROCEDURE — 71260 CT THORAX DX C+: CPT | Performed by: EMERGENCY MEDICINE

## 2020-11-14 PROCEDURE — 81001 URINALYSIS AUTO W/SCOPE: CPT | Performed by: EMERGENCY MEDICINE

## 2020-11-14 PROCEDURE — 84484 ASSAY OF TROPONIN QUANT: CPT | Performed by: EMERGENCY MEDICINE

## 2020-11-14 PROCEDURE — 93010 ELECTROCARDIOGRAM REPORT: CPT | Performed by: EMERGENCY MEDICINE

## 2020-11-14 PROCEDURE — 80048 BASIC METABOLIC PNL TOTAL CA: CPT | Performed by: EMERGENCY MEDICINE

## 2020-11-14 PROCEDURE — 93005 ELECTROCARDIOGRAM TRACING: CPT

## 2020-11-14 PROCEDURE — 71045 X-RAY EXAM CHEST 1 VIEW: CPT | Performed by: EMERGENCY MEDICINE

## 2020-11-14 PROCEDURE — 85025 COMPLETE CBC W/AUTO DIFF WBC: CPT | Performed by: EMERGENCY MEDICINE

## 2020-11-14 RX ORDER — LEVOTHYROXINE SODIUM 0.07 MG/1
TABLET ORAL
Qty: 90 TABLET | Refills: 0 | Status: SHIPPED | OUTPATIENT
Start: 2020-11-14 | End: 2021-11-08

## 2020-11-14 NOTE — ED INITIAL ASSESSMENT (HPI)
Patient reports mid chest pain, pain in the center of her back, a R sided jaw spasm and bilateral hand weakness for the past 30 minutes. AAOx4, ambulatory in triage, active ROM of all extremities in triage.

## 2020-11-14 NOTE — TELEPHONE ENCOUNTER
Refill request for:    •  Fluticasone Furoate (ARNUITY ELLIPTA) 100 MCG/ACT Inhalation Aerosol Powder, Breath Activated, Inhale 1 puff into the lungs daily. , Disp: 30 each, Rfl: 1

## 2020-11-14 NOTE — ED PROVIDER NOTES
Patient Seen in: [facility] AND [facility] Emergency Department      History   Patient presents with:  Chest Pain Angina  Back Pain  Weakness    Stated Complaint: chest pain     HPI    Patient presents to the emergency department complaining of back pain.   She s 98.8 °F (37.1 °C)   Temp src 11/13/20 2355 Temporal   SpO2 11/13/20 2353 94 %   O2 Device 11/13/20 2353 None (Room air)       Current:/72   Pulse 58   Temp 98.8 °F (37.1 °C) (Temporal)   Resp 15   Ht 160 cm (5' 3\")   Wt 59.9 kg   SpO2 97%   BMI 23. 3 DIFFERENTIAL WITH PLATELET.   Procedure                               Abnormality         Status                     ---------                               -----------         ------                     CBC W/ DIFFERENTIAL[[id number]] 0   HTN: Yes   Hypercholesterolemia: Yes   Atherosclerosis/PVD: No   DM: No   BMI>30kg/m2: No   Smoking: No   Family History: No         Other Risk Factor Score: 2           Lab Results   Component Value Date    TROP <0.045 11/14/2020           HEART Score

## 2020-11-16 RX ORDER — FLUTICASONE FUROATE 100 UG/1
1 POWDER RESPIRATORY (INHALATION) DAILY
Qty: 30 EACH | Refills: 1 | Status: SHIPPED | OUTPATIENT
Start: 2020-11-16 | End: 2021-01-14

## 2020-11-19 ENCOUNTER — LAB ENCOUNTER (OUTPATIENT)
Dept: LAB | Age: 75
End: 2020-11-19
Attending: FAMILY MEDICINE
Payer: MEDICARE

## 2020-11-19 DIAGNOSIS — Z00.00 ENCOUNTER FOR ANNUAL HEALTH EXAMINATION: ICD-10-CM

## 2020-11-19 DIAGNOSIS — E87.6 HYPOKALEMIA: ICD-10-CM

## 2020-11-19 DIAGNOSIS — Z13.1 SCREENING FOR DIABETES MELLITUS (DM): ICD-10-CM

## 2020-11-19 DIAGNOSIS — Z13.6 SCREENING FOR CARDIOVASCULAR CONDITION: ICD-10-CM

## 2020-11-19 DIAGNOSIS — E03.9 ACQUIRED HYPOTHYROIDISM: ICD-10-CM

## 2020-11-19 PROCEDURE — 80053 COMPREHEN METABOLIC PANEL: CPT

## 2020-11-19 PROCEDURE — 36415 COLL VENOUS BLD VENIPUNCTURE: CPT

## 2020-11-19 PROCEDURE — 85025 COMPLETE CBC W/AUTO DIFF WBC: CPT

## 2020-11-19 PROCEDURE — 84443 ASSAY THYROID STIM HORMONE: CPT

## 2020-11-19 PROCEDURE — 80061 LIPID PANEL: CPT

## 2020-11-19 PROCEDURE — 83036 HEMOGLOBIN GLYCOSYLATED A1C: CPT

## 2021-01-14 ENCOUNTER — OFFICE VISIT (OUTPATIENT)
Dept: FAMILY MEDICINE CLINIC | Facility: CLINIC | Age: 76
End: 2021-01-14
Payer: MEDICARE

## 2021-01-14 ENCOUNTER — LAB ENCOUNTER (OUTPATIENT)
Dept: LAB | Age: 76
End: 2021-01-14
Attending: FAMILY MEDICINE
Payer: MEDICARE

## 2021-01-14 VITALS
HEIGHT: 63 IN | HEART RATE: 68 BPM | DIASTOLIC BLOOD PRESSURE: 72 MMHG | SYSTOLIC BLOOD PRESSURE: 127 MMHG | TEMPERATURE: 98 F | BODY MASS INDEX: 22.32 KG/M2 | WEIGHT: 126 LBS

## 2021-01-14 DIAGNOSIS — E87.6 HYPOKALEMIA: ICD-10-CM

## 2021-01-14 DIAGNOSIS — E03.9 ACQUIRED HYPOTHYROIDISM: ICD-10-CM

## 2021-01-14 DIAGNOSIS — Z98.49 HISTORY OF CATARACT EXTRACTION, UNSPECIFIED LATERALITY: ICD-10-CM

## 2021-01-14 DIAGNOSIS — I10 ESSENTIAL HYPERTENSION: ICD-10-CM

## 2021-01-14 DIAGNOSIS — E87.6 HYPOKALEMIA: Primary | ICD-10-CM

## 2021-01-14 DIAGNOSIS — J45.40 MODERATE PERSISTENT ASTHMA WITHOUT COMPLICATION: ICD-10-CM

## 2021-01-14 DIAGNOSIS — H04.129 DRY EYE: ICD-10-CM

## 2021-01-14 DIAGNOSIS — G47.9 SLEEP DISTURBANCE: ICD-10-CM

## 2021-01-14 LAB
ANION GAP SERPL CALC-SCNC: 0 MMOL/L (ref 0–18)
BUN BLD-MCNC: 12 MG/DL (ref 7–18)
BUN/CREAT SERPL: 16 (ref 10–20)
CALCIUM BLD-MCNC: 9.9 MG/DL (ref 8.5–10.1)
CHLORIDE SERPL-SCNC: 107 MMOL/L (ref 98–112)
CO2 SERPL-SCNC: 32 MMOL/L (ref 21–32)
CREAT BLD-MCNC: 0.75 MG/DL
GLUCOSE BLD-MCNC: 91 MG/DL (ref 70–99)
OSMOLALITY SERPL CALC.SUM OF ELEC: 287 MOSM/KG (ref 275–295)
PATIENT FASTING Y/N/NP: NO
POTASSIUM SERPL-SCNC: 4.4 MMOL/L (ref 3.5–5.1)
SODIUM SERPL-SCNC: 139 MMOL/L (ref 136–145)
T4 FREE SERPL-MCNC: 1.4 NG/DL (ref 0.8–1.7)
TSI SER-ACNC: 0.27 MIU/ML (ref 0.36–3.74)

## 2021-01-14 PROCEDURE — 3074F SYST BP LT 130 MM HG: CPT | Performed by: FAMILY MEDICINE

## 2021-01-14 PROCEDURE — 84443 ASSAY THYROID STIM HORMONE: CPT

## 2021-01-14 PROCEDURE — 3078F DIAST BP <80 MM HG: CPT | Performed by: FAMILY MEDICINE

## 2021-01-14 PROCEDURE — 36415 COLL VENOUS BLD VENIPUNCTURE: CPT

## 2021-01-14 PROCEDURE — 3008F BODY MASS INDEX DOCD: CPT | Performed by: FAMILY MEDICINE

## 2021-01-14 PROCEDURE — 80048 BASIC METABOLIC PNL TOTAL CA: CPT

## 2021-01-14 PROCEDURE — 84439 ASSAY OF FREE THYROXINE: CPT

## 2021-01-14 PROCEDURE — 99214 OFFICE O/P EST MOD 30 MIN: CPT | Performed by: FAMILY MEDICINE

## 2021-01-14 RX ORDER — LISINOPRIL 10 MG/1
10 TABLET ORAL DAILY
Qty: 90 TABLET | Refills: 1 | Status: SHIPPED | OUTPATIENT
Start: 2021-01-14 | End: 2021-05-13

## 2021-01-14 RX ORDER — FLUTICASONE FUROATE 100 UG/1
1 POWDER RESPIRATORY (INHALATION) DAILY
Qty: 30 EACH | Refills: 1 | Status: SHIPPED | OUTPATIENT
Start: 2021-01-14

## 2021-01-14 RX ORDER — ALBUTEROL SULFATE 90 UG/1
2 AEROSOL, METERED RESPIRATORY (INHALATION) EVERY 4 HOURS PRN
Qty: 1 INHALER | Refills: 1 | Status: SHIPPED | OUTPATIENT
Start: 2021-01-14 | End: 2021-05-13

## 2021-01-14 NOTE — PROGRESS NOTES
Patient presents with: Follow - Up: medication   Referral    HPI:   Laura Moran is a 78year old female who presents to clinic with for BP follow up and lab work. Checks BP at home 125-130/80s. Started on lisinopril last month.   No CP, SOB, or dizzines needed for Wheezing. Dispense: 1 Inhaler; Refill: 1  - Fluticasone Furoate (ARNUITY ELLIPTA) 100 MCG/ACT Inhalation Aerosol Powder, Breath Activated; Inhale 1 puff into the lungs daily. Dispense: 30 each; Refill: 1    2.  Essential hypertension  -Blood pr

## 2021-01-19 RX ORDER — LEVOTHYROXINE SODIUM 0.05 MG/1
50 TABLET ORAL
Qty: 60 TABLET | Refills: 0 | Status: SHIPPED | OUTPATIENT
Start: 2021-01-19 | End: 2021-01-26

## 2021-01-19 NOTE — TELEPHONE ENCOUNTER
TSH decreased. Decreasing levothyroxine dosing to 50 mcg daily from 75 mcg daily. Patient informed. New lab work orders to be done in 6 weeks ordered for patient.

## 2021-01-23 ENCOUNTER — NURSE TRIAGE (OUTPATIENT)
Dept: FAMILY MEDICINE CLINIC | Facility: CLINIC | Age: 76
End: 2021-01-23

## 2021-01-23 ENCOUNTER — LAB ENCOUNTER (OUTPATIENT)
Dept: LAB | Age: 76
End: 2021-01-23
Attending: PHYSICIAN ASSISTANT
Payer: MEDICARE

## 2021-01-23 ENCOUNTER — VIRTUAL PHONE E/M (OUTPATIENT)
Dept: INTERNAL MEDICINE CLINIC | Facility: CLINIC | Age: 76
End: 2021-01-23
Payer: MEDICARE

## 2021-01-23 DIAGNOSIS — Z20.822 SUSPECTED COVID-19 VIRUS INFECTION: ICD-10-CM

## 2021-01-23 DIAGNOSIS — E03.9 ACQUIRED HYPOTHYROIDISM: ICD-10-CM

## 2021-01-23 DIAGNOSIS — R53.83 FATIGUE, UNSPECIFIED TYPE: ICD-10-CM

## 2021-01-23 DIAGNOSIS — R53.83 FATIGUE, UNSPECIFIED TYPE: Primary | ICD-10-CM

## 2021-01-23 PROCEDURE — 99441 PHONE E/M BY PHYS 5-10 MIN: CPT | Performed by: PHYSICIAN ASSISTANT

## 2021-01-23 NOTE — PROGRESS NOTES
Telephone Check-In    Ravin Dumont verbally consents to a Virtual/Telephone Check-In service on 01/23/21. Patient understands and accepts financial responsibility for any deductible, co-insurance and/or co-pays associated with this service.     Duration of

## 2021-01-23 NOTE — TELEPHONE ENCOUNTER
Action Requested: Summary for Provider     []  Critical Lab, Recommendations Needed  [] Need Additional Advice  [x]   FYI    []   Need Orders  [] Need Medications Sent to Pharmacy  []  Other     SUMMARY: Patient calling with complaint of fatigue that start

## 2021-01-25 LAB — SARS-COV-2 RNA RESP QL NAA+PROBE: NOT DETECTED

## 2021-01-26 RX ORDER — LEVOTHYROXINE SODIUM 0.07 MG/1
75 TABLET ORAL
Qty: 90 TABLET | Refills: 1 | COMMUNITY
Start: 2021-01-26 | End: 2021-05-13

## 2021-01-26 NOTE — TELEPHONE ENCOUNTER
Patient calling back regarding her MyChart message sent from [facility]. Had virtual visit on 1/23/21. She is still having weakness/fatigue. She is concerned about her potassium and would like a call from Dr. Avtar Parrish.     Viewed by Robert Nagy on 1/26/2021

## 2021-01-26 NOTE — TELEPHONE ENCOUNTER
Spoke to patient and advised her to return to original dosing of 75 mcg levothyroxine. This should help the fatigue.

## 2021-02-03 DIAGNOSIS — Z23 NEED FOR VACCINATION: ICD-10-CM

## 2021-02-05 ENCOUNTER — IMMUNIZATION (OUTPATIENT)
Dept: LAB | Age: 76
End: 2021-02-05
Attending: HOSPITALIST
Payer: MEDICARE

## 2021-02-05 DIAGNOSIS — Z23 NEED FOR VACCINATION: Primary | ICD-10-CM

## 2021-02-05 PROCEDURE — 0001A SARSCOV2 VAC 30MCG/0.3ML IM: CPT

## 2021-02-26 ENCOUNTER — IMMUNIZATION (OUTPATIENT)
Dept: LAB | Age: 76
End: 2021-02-26
Attending: HOSPITALIST
Payer: MEDICARE

## 2021-02-26 DIAGNOSIS — Z23 NEED FOR VACCINATION: Primary | ICD-10-CM

## 2021-02-26 PROCEDURE — 0002A SARSCOV2 VAC 30MCG/0.3ML IM: CPT

## 2021-03-07 DIAGNOSIS — E78.2 MIXED HYPERLIPIDEMIA: ICD-10-CM

## 2021-03-09 RX ORDER — ROSUVASTATIN CALCIUM 40 MG/1
40 TABLET, COATED ORAL EVERY EVENING
Qty: 90 TABLET | Refills: 0 | Status: SHIPPED | OUTPATIENT
Start: 2021-03-09 | End: 2021-05-13

## 2021-05-13 ENCOUNTER — OFFICE VISIT (OUTPATIENT)
Dept: FAMILY MEDICINE CLINIC | Facility: CLINIC | Age: 76
End: 2021-05-13
Payer: MEDICARE

## 2021-05-13 ENCOUNTER — LAB ENCOUNTER (OUTPATIENT)
Dept: LAB | Age: 76
End: 2021-05-13
Attending: FAMILY MEDICINE
Payer: MEDICARE

## 2021-05-13 VITALS
HEART RATE: 71 BPM | DIASTOLIC BLOOD PRESSURE: 68 MMHG | WEIGHT: 125 LBS | HEIGHT: 63 IN | SYSTOLIC BLOOD PRESSURE: 115 MMHG | BODY MASS INDEX: 22.15 KG/M2 | TEMPERATURE: 98 F

## 2021-05-13 DIAGNOSIS — E03.9 ACQUIRED HYPOTHYROIDISM: ICD-10-CM

## 2021-05-13 DIAGNOSIS — E78.2 MIXED HYPERLIPIDEMIA: ICD-10-CM

## 2021-05-13 DIAGNOSIS — I10 ESSENTIAL HYPERTENSION: ICD-10-CM

## 2021-05-13 DIAGNOSIS — R41.3 MEMORY LOSS: Primary | ICD-10-CM

## 2021-05-13 DIAGNOSIS — G47.9 SLEEP DISTURBANCE: ICD-10-CM

## 2021-05-13 DIAGNOSIS — K21.9 GASTROESOPHAGEAL REFLUX DISEASE, UNSPECIFIED WHETHER ESOPHAGITIS PRESENT: ICD-10-CM

## 2021-05-13 DIAGNOSIS — R41.3 MEMORY LOSS: ICD-10-CM

## 2021-05-13 DIAGNOSIS — J45.40 MODERATE PERSISTENT ASTHMA WITHOUT COMPLICATION: ICD-10-CM

## 2021-05-13 PROCEDURE — 86780 TREPONEMA PALLIDUM: CPT

## 2021-05-13 PROCEDURE — 99214 OFFICE O/P EST MOD 30 MIN: CPT | Performed by: FAMILY MEDICINE

## 2021-05-13 PROCEDURE — 82306 VITAMIN D 25 HYDROXY: CPT

## 2021-05-13 PROCEDURE — 82607 VITAMIN B-12: CPT

## 2021-05-13 PROCEDURE — 84443 ASSAY THYROID STIM HORMONE: CPT

## 2021-05-13 PROCEDURE — 82746 ASSAY OF FOLIC ACID SERUM: CPT

## 2021-05-13 PROCEDURE — 84439 ASSAY OF FREE THYROXINE: CPT

## 2021-05-13 PROCEDURE — 80053 COMPREHEN METABOLIC PANEL: CPT

## 2021-05-13 PROCEDURE — 3008F BODY MASS INDEX DOCD: CPT | Performed by: FAMILY MEDICINE

## 2021-05-13 PROCEDURE — 3074F SYST BP LT 130 MM HG: CPT | Performed by: FAMILY MEDICINE

## 2021-05-13 PROCEDURE — 36415 COLL VENOUS BLD VENIPUNCTURE: CPT

## 2021-05-13 PROCEDURE — 85025 COMPLETE CBC W/AUTO DIFF WBC: CPT

## 2021-05-13 PROCEDURE — 3078F DIAST BP <80 MM HG: CPT | Performed by: FAMILY MEDICINE

## 2021-05-13 PROCEDURE — 80061 LIPID PANEL: CPT

## 2021-05-13 RX ORDER — PREDNISOLONE ACETATE 10 MG/ML
SUSPENSION/ DROPS OPHTHALMIC
COMMUNITY
Start: 2021-03-17

## 2021-05-13 RX ORDER — LISINOPRIL 10 MG/1
10 TABLET ORAL DAILY
Qty: 90 TABLET | Refills: 1 | Status: SHIPPED | OUTPATIENT
Start: 2021-05-13 | End: 2021-12-30

## 2021-05-13 RX ORDER — ALBUTEROL SULFATE 90 UG/1
2 AEROSOL, METERED RESPIRATORY (INHALATION) EVERY 4 HOURS PRN
Qty: 1 INHALER | Refills: 1 | Status: SHIPPED | OUTPATIENT
Start: 2021-05-13

## 2021-05-13 RX ORDER — ROSUVASTATIN CALCIUM 40 MG/1
40 TABLET, COATED ORAL EVERY EVENING
Qty: 90 TABLET | Refills: 0 | Status: SHIPPED | OUTPATIENT
Start: 2021-05-13 | End: 2021-09-30

## 2021-05-13 RX ORDER — FAMOTIDINE 40 MG/1
40 TABLET, FILM COATED ORAL DAILY
Qty: 60 TABLET | Refills: 1 | Status: SHIPPED | OUTPATIENT
Start: 2021-05-13 | End: 2021-08-24

## 2021-05-13 RX ORDER — LEVOTHYROXINE SODIUM 0.07 MG/1
75 TABLET ORAL
Qty: 90 TABLET | Refills: 1 | Status: SHIPPED | OUTPATIENT
Start: 2021-05-13 | End: 2021-06-08

## 2021-05-13 NOTE — PROGRESS NOTES
Patient presents with:   Follow - Up: bp, medications, allergies  Memory Loss  Sleep Problem    HPI:   Zena Perdomo is a 78year old female who presents to clinic with complaints of memory changes, sleep disturbance and need for medication refill as well as B12; Future  - TSH W REFLEX TO FREE T4; Future  - T PALLIDUM SCREENING CASCADE; Future  - LIPID PANEL; Future  - FOLIC ACID SERUM(FOLATE); Future  - COMP METABOLIC PANEL (14); Future  - CBC WITH DIFFERENTIAL WITH PLATELET; Future  - NEURO - INTERNAL    2.

## 2021-05-17 ENCOUNTER — TELEPHONE (OUTPATIENT)
Dept: FAMILY MEDICINE CLINIC | Facility: CLINIC | Age: 76
End: 2021-05-17

## 2021-05-19 ENCOUNTER — HOSPITAL ENCOUNTER (OUTPATIENT)
Dept: CT IMAGING | Age: 76
Discharge: HOME | End: 2021-05-19
Attending: FAMILY MEDICINE
Payer: MEDICARE

## 2021-05-19 DIAGNOSIS — R41.3 MEMORY LOSS: ICD-10-CM

## 2021-05-19 PROCEDURE — 70470 CT HEAD/BRAIN W/O & W/DYE: CPT | Performed by: FAMILY MEDICINE

## 2021-06-05 ENCOUNTER — NURSE TRIAGE (OUTPATIENT)
Dept: FAMILY MEDICINE CLINIC | Facility: CLINIC | Age: 76
End: 2021-06-05

## 2021-06-05 ENCOUNTER — HOSPITAL ENCOUNTER (EMERGENCY)
Facility: HOSPITAL | Age: 76
Discharge: HOME | End: 2021-06-05
Attending: EMERGENCY MEDICINE
Payer: MEDICARE

## 2021-06-05 ENCOUNTER — APPOINTMENT (OUTPATIENT)
Dept: CT IMAGING | Facility: HOSPITAL | Age: 76
End: 2021-06-05
Attending: EMERGENCY MEDICINE
Payer: MEDICARE

## 2021-06-05 VITALS
SYSTOLIC BLOOD PRESSURE: 148 MMHG | DIASTOLIC BLOOD PRESSURE: 92 MMHG | BODY MASS INDEX: 23 KG/M2 | TEMPERATURE: 97 F | WEIGHT: 125 LBS | HEART RATE: 74 BPM | RESPIRATION RATE: 18 BRPM | HEIGHT: 62 IN | OXYGEN SATURATION: 98 %

## 2021-06-05 DIAGNOSIS — R10.13 ABDOMINAL PAIN, EPIGASTRIC: Primary | ICD-10-CM

## 2021-06-05 DIAGNOSIS — K44.9 HIATAL HERNIA: ICD-10-CM

## 2021-06-05 DIAGNOSIS — N30.90 CYSTITIS: ICD-10-CM

## 2021-06-05 PROCEDURE — 80076 HEPATIC FUNCTION PANEL: CPT | Performed by: EMERGENCY MEDICINE

## 2021-06-05 PROCEDURE — 83690 ASSAY OF LIPASE: CPT | Performed by: EMERGENCY MEDICINE

## 2021-06-05 PROCEDURE — 80048 BASIC METABOLIC PNL TOTAL CA: CPT | Performed by: EMERGENCY MEDICINE

## 2021-06-05 PROCEDURE — 85025 COMPLETE CBC W/AUTO DIFF WBC: CPT | Performed by: EMERGENCY MEDICINE

## 2021-06-05 PROCEDURE — C9113 INJ PANTOPRAZOLE SODIUM, VIA: HCPCS | Performed by: EMERGENCY MEDICINE

## 2021-06-05 PROCEDURE — 99284 EMERGENCY DEPT VISIT MOD MDM: CPT

## 2021-06-05 PROCEDURE — 81001 URINALYSIS AUTO W/SCOPE: CPT | Performed by: EMERGENCY MEDICINE

## 2021-06-05 PROCEDURE — 87086 URINE CULTURE/COLONY COUNT: CPT | Performed by: EMERGENCY MEDICINE

## 2021-06-05 PROCEDURE — 96374 THER/PROPH/DIAG INJ IV PUSH: CPT

## 2021-06-05 PROCEDURE — 74177 CT ABD & PELVIS W/CONTRAST: CPT | Performed by: EMERGENCY MEDICINE

## 2021-06-05 RX ORDER — PANTOPRAZOLE SODIUM 20 MG/1
20 TABLET, DELAYED RELEASE ORAL DAILY
Qty: 14 TABLET | Refills: 0 | Status: SHIPPED | OUTPATIENT
Start: 2021-06-05 | End: 2021-06-19

## 2021-06-05 RX ORDER — CEPHALEXIN 500 MG/1
500 CAPSULE ORAL 3 TIMES DAILY
Qty: 21 CAPSULE | Refills: 0 | Status: SHIPPED | OUTPATIENT
Start: 2021-06-05 | End: 2021-06-12

## 2021-06-05 NOTE — ED INITIAL ASSESSMENT (HPI)
Nette Braun arrived through triage with her daughter for c/o epigastric pain x1 week. Associated with decreased appetite and abdominal bloating. States pain will sometimes travel to her mid-back and into shoulder blades. No c/o nausea.  Denies urinary or bowel sy

## 2021-06-05 NOTE — ED PROVIDER NOTES
Patient Seen in: [facility] AND [facility] Emergency Department      History   Patient presents with:  Abdominal Pain    Stated Complaint: abd and back pain.      HPI/Subjective:   HPI    Patient is a 33-year-old female that complains of abdominal distention for Exam    Constitutional: Oriented to person, place, and time. Appears well-developed and well-nourished. HEENT:   Head: Normocephalic and atraumatic.    Right Ear: External ear normal.   Left Ear: External ear normal.   Nose: Nose normal.   Mouth/Throat: O DIFFERENTIAL[[id number]]                              Final result                 Please view results for these tests on the individual orders.    URINE CULTURE, ROUTINE   CBC W/ DIFFERENTIAL          CT ABDOMEN+PELVIS(CONTRAST ONLY)(CPT=74177)    Result Da

## 2021-06-05 NOTE — TELEPHONE ENCOUNTER
Action Requested: Summary for Provider     []  Critical Lab, Recommendations Needed  [x] Need Additional Advice  []   FYI    []   Need Orders  [] Need Medications Sent to Pharmacy  []  Other     SUMMARY: Patient states for past 1 1/2 weeks she has been hav

## 2021-06-07 ENCOUNTER — TELEPHONE (OUTPATIENT)
Dept: FAMILY MEDICINE CLINIC | Facility: CLINIC | Age: 76
End: 2021-06-07

## 2021-06-07 NOTE — TELEPHONE ENCOUNTER
Spoke with pt,  verified. Pt stated she was seen in ER on 21 for abd and back pain.    Pt stated she is a little better but still has pain under her breast, bloated, mid back pain but her sx is better than before  Pt was given abx for UTI, pantopraz

## 2021-06-08 ENCOUNTER — OFFICE VISIT (OUTPATIENT)
Dept: FAMILY MEDICINE CLINIC | Facility: CLINIC | Age: 76
End: 2021-06-08
Payer: MEDICARE

## 2021-06-08 VITALS
HEART RATE: 66 BPM | TEMPERATURE: 98 F | HEIGHT: 62 IN | DIASTOLIC BLOOD PRESSURE: 63 MMHG | WEIGHT: 123 LBS | SYSTOLIC BLOOD PRESSURE: 113 MMHG | BODY MASS INDEX: 22.63 KG/M2

## 2021-06-08 DIAGNOSIS — R14.0 ABDOMINAL BLOATING: ICD-10-CM

## 2021-06-08 DIAGNOSIS — R10.13 EPIGASTRIC PAIN: ICD-10-CM

## 2021-06-08 DIAGNOSIS — Z12.31 ENCOUNTER FOR SCREENING MAMMOGRAM FOR MALIGNANT NEOPLASM OF BREAST: Primary | ICD-10-CM

## 2021-06-08 PROCEDURE — 3008F BODY MASS INDEX DOCD: CPT | Performed by: FAMILY MEDICINE

## 2021-06-08 PROCEDURE — 3078F DIAST BP <80 MM HG: CPT | Performed by: FAMILY MEDICINE

## 2021-06-08 PROCEDURE — 99214 OFFICE O/P EST MOD 30 MIN: CPT | Performed by: FAMILY MEDICINE

## 2021-06-08 PROCEDURE — 3074F SYST BP LT 130 MM HG: CPT | Performed by: FAMILY MEDICINE

## 2021-06-08 RX ORDER — SACCHAROMYCES BOULARDII 250 MG
250 CAPSULE ORAL 2 TIMES DAILY
Qty: 90 CAPSULE | Refills: 1 | Status: SHIPPED | OUTPATIENT
Start: 2021-06-08 | End: 2021-08-24

## 2021-06-08 NOTE — PATIENT INSTRUCTIONS
-Okay to stop taking the antibiotic for the urinary tract infection. Start taking probiotics. Stop eating yogurt. Please switch from 2%/cow's milk to a plant-based milk that is unsweetened such as oat milk or almond milk.     Can try taking simethicone f

## 2021-06-08 NOTE — PROGRESS NOTES
HPI:   Wilmer Vazquez is a 78year old female who presents for follow-up after being discharged from [facility] emergency room on 6/5/2021.   She presented to the ER complaining of epigastric abdominal pain and distention, abdominal bloating, poor appe Activated Inhale 1 puff into the lungs daily.  30 each 1   • LEVOTHYROXINE SODIUM 75 MCG Oral Tab Take 1 tablet by mouth every morning before breakfast 90 tablet 0   • Cholecalciferol (VITAMIN D) 50 MCG (2000 UT) Oral Tab TAKE ONE TABLET BY MOUTH ONE TIME D lesions  EYES:denies blurred vision or double vision  HEENT: denies nasal congestion, sinus pain or ST  LUNGS: denies shortness of breath with exertion, denies orthopnea  CARDIOVASCULAR: denies chest pain on exertion  GI: Epigastric abdominal pain and bloa MD  6/8/2021  2:04 PM

## 2021-06-12 RX ORDER — CHOLECALCIFEROL (VITAMIN D3) 125 MCG
CAPSULE ORAL
Qty: 90 TABLET | Refills: 0 | Status: SHIPPED | OUTPATIENT
Start: 2021-06-12 | End: 2021-10-02

## 2021-06-17 ENCOUNTER — TELEPHONE (OUTPATIENT)
Dept: GASTROENTEROLOGY | Facility: CLINIC | Age: 76
End: 2021-06-17

## 2021-06-17 ENCOUNTER — TELEPHONE (OUTPATIENT)
Dept: FAMILY MEDICINE CLINIC | Facility: CLINIC | Age: 76
End: 2021-06-17

## 2021-06-17 DIAGNOSIS — R19.7 DIARRHEA, UNSPECIFIED TYPE: Primary | ICD-10-CM

## 2021-06-17 NOTE — TELEPHONE ENCOUNTER
Patient states she has gone to the bathroom 6 times last night with diarrhea. Please call. Thank you.

## 2021-06-17 NOTE — TELEPHONE ENCOUNTER
Dr. Dudley Matson    I spoke to [facility] she has been having diarrhea, poor appetite, bloating, and pain that is like a pressure in the abdomen that radiates to the back.     Reports she was recently in ED for the same thing and followed up with PCP but nothing is hel

## 2021-06-17 NOTE — TELEPHONE ENCOUNTER
Spoke with pt,  verified, pt was seen by Dr. Jose Mckeon  21 and was in ER on 21.   Pt stated she was given meds for GI issue, she followed all MD recommendations but her sx still has no improvement, she has bloating, belly ache, everything is the same

## 2021-06-17 NOTE — TELEPHONE ENCOUNTER
Bloating and pain chest/pain in back and now diarrhea ( last night)  - smaller meals, bland foods  - check for c dif if ongoing diarrhea  - RN to set up appointment next week in office, ok to add and will need to arrange EGD with MAC to check the hiatal he

## 2021-06-17 NOTE — TELEPHONE ENCOUNTER
Advised pt of providers note below. States today she hasn't had any diarrhea yet. She is also waiting for Dr Nahomi Banks office to call back and see if they can get her in sooner.  Pt verbalized understanding below instructions and no further questions or dianna

## 2021-06-17 NOTE — TELEPHONE ENCOUNTER
Would have patient give a stool culture if she is having diarrhea now. Orders in the system. May need antibiotics. Will await stool culture result. Okay to see different GI if scheduling can help her. I can generate a new referral if needed.

## 2021-06-18 NOTE — TELEPHONE ENCOUNTER
Dr. Jeana Dai    When would you like patient added to schedule? Would Wednesday at 8:30am be ok?     Thank you

## 2021-06-20 DIAGNOSIS — R14.0 ABDOMINAL BLOATING: ICD-10-CM

## 2021-06-21 RX ORDER — SIMETHICONE 80 MG/1
TABLET, CHEWABLE ORAL
Qty: 45 TABLET | Refills: 0 | Status: SHIPPED | OUTPATIENT
Start: 2021-06-21 | End: 2021-08-24

## 2021-06-21 NOTE — TELEPHONE ENCOUNTER
Patient called back-reports she feels the same. I reviewed below complete message from Dr. Melba Jones. Patient accepted appointment Wednesday at 8:30am with Dr. Melba Jones. Given detailed directions to [facility] office.   I am just waiting for the spot to be opened on

## 2021-06-21 NOTE — TELEPHONE ENCOUNTER
Patient added to Dr. Bernie Antonio schedule this Wednesday.     Future Appointments   Date Time Provider Golden Salazar   6/23/2021  8:30 [facility]   6/24/2021 10:00 AM [facility] [place] [address] [facility] EM [place]   7/22/2021 10:30 AM H

## 2021-06-23 ENCOUNTER — LAB ENCOUNTER (OUTPATIENT)
Dept: LAB | Age: 76
End: 2021-06-23
Attending: FAMILY MEDICINE
Payer: MEDICARE

## 2021-06-23 ENCOUNTER — TELEPHONE (OUTPATIENT)
Dept: GASTROENTEROLOGY | Facility: CLINIC | Age: 76
End: 2021-06-23

## 2021-06-23 ENCOUNTER — OFFICE VISIT (OUTPATIENT)
Dept: GASTROENTEROLOGY | Facility: CLINIC | Age: 76
End: 2021-06-23
Payer: MEDICARE

## 2021-06-23 VITALS
BODY MASS INDEX: 22.78 KG/M2 | HEIGHT: 62 IN | HEART RATE: 59 BPM | SYSTOLIC BLOOD PRESSURE: 127 MMHG | DIASTOLIC BLOOD PRESSURE: 72 MMHG | WEIGHT: 123.81 LBS

## 2021-06-23 DIAGNOSIS — R19.7 DIARRHEA, UNSPECIFIED TYPE: ICD-10-CM

## 2021-06-23 DIAGNOSIS — K44.9 HIATAL HERNIA: ICD-10-CM

## 2021-06-23 DIAGNOSIS — R10.13 EPIGASTRIC ABDOMINAL PAIN: ICD-10-CM

## 2021-06-23 DIAGNOSIS — K83.8 DILATED BILE DUCT: ICD-10-CM

## 2021-06-23 DIAGNOSIS — R10.13 EPIGASTRIC PAIN: Primary | ICD-10-CM

## 2021-06-23 DIAGNOSIS — R11.0 NAUSEA: Primary | ICD-10-CM

## 2021-06-23 DIAGNOSIS — R10.13 EPIGASTRIC PAIN: ICD-10-CM

## 2021-06-23 DIAGNOSIS — R14.0 BLOATING: ICD-10-CM

## 2021-06-23 PROCEDURE — 87045 FECES CULTURE AEROBIC BACT: CPT

## 2021-06-23 PROCEDURE — 87046 STOOL CULTR AEROBIC BACT EA: CPT

## 2021-06-23 PROCEDURE — 3008F BODY MASS INDEX DOCD: CPT | Performed by: INTERNAL MEDICINE

## 2021-06-23 PROCEDURE — 99213 OFFICE O/P EST LOW 20 MIN: CPT | Performed by: INTERNAL MEDICINE

## 2021-06-23 PROCEDURE — 3074F SYST BP LT 130 MM HG: CPT | Performed by: INTERNAL MEDICINE

## 2021-06-23 PROCEDURE — 87493 C DIFF AMPLIFIED PROBE: CPT

## 2021-06-23 PROCEDURE — 87427 SHIGA-LIKE TOXIN AG IA: CPT

## 2021-06-23 PROCEDURE — 87507 IADNA-DNA/RNA PROBE TQ 12-25: CPT

## 2021-06-23 PROCEDURE — 3078F DIAST BP <80 MM HG: CPT | Performed by: INTERNAL MEDICINE

## 2021-06-23 RX ORDER — OMEPRAZOLE 20 MG/1
20 CAPSULE, DELAYED RELEASE ORAL EVERY MORNING
Qty: 30 CAPSULE | Refills: 3 | Status: SHIPPED | OUTPATIENT
Start: 2021-06-23 | End: 2021-10-15

## 2021-06-23 NOTE — TELEPHONE ENCOUNTER
I called patient back. She wanted to know where to go the day of the procedure, but she already spoke to someone and does not have any further questions.

## 2021-06-23 NOTE — PAT NURSING NOTE
Per Ic, pt does not need to be covid tested prior to procedure due to verified fully vaccinated (2/5/21, 2/26/21), asymptomatic, not immunocompromised, and greater than 2 weeks post second dose of vaccine. Health history reviewed.

## 2021-06-23 NOTE — TELEPHONE ENCOUNTER
Scheduled for: ,Julian Nunes [id number]   Provider Name:    Date:  6/25/21  Location:  [facility]   Sedation:  Mac  Time:  2:00 Pm(pt is aware to arrive at 1:00 Pm)   Prep: Egd  Prep instructions were given to pt in the office, pt verbalized understanding. Npo after 8 A

## 2021-06-23 NOTE — PROGRESS NOTES
Ravin Dumont is a 78year old female.     HPI:   Patient presents with:  Abdominal Pain: bloating, poor appetite ,diarrhea    The patient is a 68-year-old female who has a history of hypothyroidism, prior cholecystectomy who presents with a 3-week history o History: Social History    Tobacco Use      Smoking status: Never Smoker      Smokeless tobacco: Never Used    Vaping Use      Vaping Use: Never used    Alcohol use: No      Alcohol/week: 0.0 standard drinks    Drug use: No       Medications (Active prior Allergies:    Adhesive Tape           RASH    Comment:ekg pads. Codeine                 SWELLING  Triamazide [Hydroch*    OTHER (SEE COMMENTS)    Comment:Hypokalemia.   Nystatin                RASH, ITCHING      ROS:   The patient denies any chest pa instructed if she had does have solid stool she cannot run the stool testing.       Plan  Abdominal pain and bloating  - hiatal hernia   - EGD with MAC  - smaller more frequent meals  - omeprazole daily   - MRI-MRCP to rule out bile duct  - see surgeon /

## 2021-06-23 NOTE — PATIENT INSTRUCTIONS
Abdominal pain and bloating  - hiatal hernia   - EGD with MAC  - smaller more frequent meals  - omeprazole daily   - MRI-MRCP to rule out bile duct  - see surgeon /Dr. Warren Forbes [phone]     Diarrhea/constipation  - check stool tests if diarrhea continues  -

## 2021-06-24 ENCOUNTER — TELEPHONE (OUTPATIENT)
Dept: GASTROENTEROLOGY | Facility: CLINIC | Age: 76
End: 2021-06-24

## 2021-06-24 ENCOUNTER — HOSPITAL ENCOUNTER (OUTPATIENT)
Dept: MAMMOGRAPHY | Age: 76
Discharge: HOME | End: 2021-06-24
Attending: FAMILY MEDICINE
Payer: MEDICARE

## 2021-06-24 DIAGNOSIS — Z12.31 ENCOUNTER FOR SCREENING MAMMOGRAM FOR MALIGNANT NEOPLASM OF BREAST: ICD-10-CM

## 2021-06-24 PROCEDURE — 77067 SCR MAMMO BI INCL CAD: CPT | Performed by: FAMILY MEDICINE

## 2021-06-24 PROCEDURE — 77063 BREAST TOMOSYNTHESIS BI: CPT | Performed by: FAMILY MEDICINE

## 2021-06-24 NOTE — TELEPHONE ENCOUNTER
The patient's daughter contact me this evening as the on-call physician. She has an upper endoscopy scheduled tomorrow with Dr. Mando Grajeda. She was concerned with regards to no orders for a bowel preparation.   We discussed that this is not necessary for an up

## 2021-06-25 ENCOUNTER — ANESTHESIA (OUTPATIENT)
Dept: ENDOSCOPY | Facility: HOSPITAL | Age: 76
End: 2021-06-25
Payer: MEDICARE

## 2021-06-25 ENCOUNTER — HOSPITAL ENCOUNTER (OUTPATIENT)
Facility: HOSPITAL | Age: 76
Setting detail: HOSPITAL OUTPATIENT SURGERY
Discharge: HOME | End: 2021-06-25
Attending: INTERNAL MEDICINE | Admitting: INTERNAL MEDICINE
Payer: MEDICARE

## 2021-06-25 ENCOUNTER — ANESTHESIA EVENT (OUTPATIENT)
Dept: ENDOSCOPY | Facility: HOSPITAL | Age: 76
End: 2021-06-25
Payer: MEDICARE

## 2021-06-25 VITALS
SYSTOLIC BLOOD PRESSURE: 116 MMHG | RESPIRATION RATE: 16 BRPM | HEIGHT: 63 IN | OXYGEN SATURATION: 97 % | TEMPERATURE: 97 F | HEART RATE: 60 BPM | WEIGHT: 124 LBS | BODY MASS INDEX: 21.97 KG/M2 | DIASTOLIC BLOOD PRESSURE: 68 MMHG

## 2021-06-25 DIAGNOSIS — R11.0 NAUSEA: ICD-10-CM

## 2021-06-25 DIAGNOSIS — R10.13 EPIGASTRIC ABDOMINAL PAIN: ICD-10-CM

## 2021-06-25 DIAGNOSIS — R14.0 BLOATING: ICD-10-CM

## 2021-06-25 PROCEDURE — 43239 EGD BIOPSY SINGLE/MULTIPLE: CPT | Performed by: INTERNAL MEDICINE

## 2021-06-25 PROCEDURE — 0DB48ZX EXCISION OF ESOPHAGOGASTRIC JUNCTION, VIA NATURAL OR ARTIFICIAL OPENING ENDOSCOPIC, DIAGNOSTIC: ICD-10-PCS | Performed by: INTERNAL MEDICINE

## 2021-06-25 RX ORDER — SODIUM CHLORIDE, SODIUM LACTATE, POTASSIUM CHLORIDE, CALCIUM CHLORIDE 600; 310; 30; 20 MG/100ML; MG/100ML; MG/100ML; MG/100ML
INJECTION, SOLUTION INTRAVENOUS CONTINUOUS
Status: CANCELLED | OUTPATIENT
Start: 2021-06-25

## 2021-06-25 RX ORDER — SODIUM CHLORIDE, SODIUM LACTATE, POTASSIUM CHLORIDE, CALCIUM CHLORIDE 600; 310; 30; 20 MG/100ML; MG/100ML; MG/100ML; MG/100ML
INJECTION, SOLUTION INTRAVENOUS CONTINUOUS
Status: DISCONTINUED | OUTPATIENT
Start: 2021-06-25 | End: 2021-06-25

## 2021-06-25 RX ORDER — NALOXONE HYDROCHLORIDE 0.4 MG/ML
80 INJECTION, SOLUTION INTRAMUSCULAR; INTRAVENOUS; SUBCUTANEOUS AS NEEDED
Status: CANCELLED | OUTPATIENT
Start: 2021-06-25 | End: 2021-06-25

## 2021-06-25 RX ADMIN — SODIUM CHLORIDE, SODIUM LACTATE, POTASSIUM CHLORIDE, CALCIUM CHLORIDE: 600; 310; 30; 20 INJECTION, SOLUTION INTRAVENOUS at 14:07:00

## 2021-06-25 RX ADMIN — SODIUM CHLORIDE, SODIUM LACTATE, POTASSIUM CHLORIDE, CALCIUM CHLORIDE: 600; 310; 30; 20 INJECTION, SOLUTION INTRAVENOUS at 13:56:00

## 2021-06-25 NOTE — OPERATIVE REPORT
[facility] Endoscopy Report      Preoperative Diagnosis:  -Abdominal pain  -History of hiatal hernia    Postoperative Diagnosis:  -Hiatal hernia x5 cm  -Schatzki's ring    Procedure:    Esophagogastroduodenoscopy      Surgeon:  Tess Ch

## 2021-06-25 NOTE — H&P
History & Physical Examination    Patient Name: Orlando Shields  MRN: [id number]  CSN: [id number]  YOB: 1945    Diagnosis:     Abdominal pain      omeprazole 20 MG Oral Capsule Delayed Release, Take 1 capsule (20 mg total) by mouth every morning. total) rectally 2 (two) times daily.  (Patient not taking: Reported on 9/16/2020 ), Disp: 30 suppository, Rfl: 0      lactated ringers infusion, , Intravenous, Continuous    propofol (DIPRIVAN) injection, , Intravenous, PRN  propofol (DIPRIVAN) infusion, , care.  [ x ] I have reviewed the History and Physical done within the last 30 days. Any changes noted above. Nichelle Cleveland  6/25/2021  2:10 PM

## 2021-06-25 NOTE — ANESTHESIA POSTPROCEDURE EVALUATION
Patient: Shauna Francis    Procedure Summary     Date: 06/25/21 Room / Location: [facility] ENDOSCOPY 01 / [facility] ENDOSCOPY    Anesthesia Start: 7249 Anesthesia Stop: 1838    Procedure: ESOPHAGOGASTRODUODENOSCOPY (EGD) (N/A ) Diagnosis:       Nausea      Bloating

## 2021-06-25 NOTE — ANESTHESIA PREPROCEDURE EVALUATION
Anesthesia PreOp Note    HPI:     Luanne Macdonald is a 78year old female who presents for preoperative consultation requested by: Jackalyn Duane, MD    Date of Surgery: 6/25/2021    Procedure(s):  ESOPHAGOGASTRODUODENOSCOPY (EGD)  Indication: Nausea, Bloat APPENDECTOMY     • CATARACT EXTRACTION W/  INTRAOCULAR LENS IMPLANT Right 3/2/17    Dr. Melquiades River @ [address]    • CATARACT EXTRACTION W/  INTRAOCULAR LENS IMPLANT Left 6/12/17    Dr. Melquiades River @ [address] [place] [address]     • COLONOSCOPY     • EGD  2019   • HYSTERECTOM 11/13/2020 ), Disp: 28.4 g, Rfl: 0  Hydrocortisone Acetate 25 MG Rectal Suppos, Place 1 suppository (25 mg total) rectally 2 (two) times daily.  (Patient not taking: Reported on 9/16/2020 ), Disp: 30 suppository, Rfl: 0      lactated ringers infusion, , Int pacemaker: No        Pt has a defibrillator: No        Breast feeding: Not Asked        Reaction to local anesthetic: No    Social History Narrative      Not on file    Social Determinants of Health  Financial Resource Strain:       [facility] of [facility]ing [facility] Height: 1.6 m (5' 3\")        Anesthesia Evaluation     Patient summary reviewed and Nursing notes reviewed    No history of anesthetic complications   Airway   Mallampati: II  TM distance: >3 FB  Neck ROM: full  Dental          Pulmonary - normal exam   (

## 2021-07-01 ENCOUNTER — OFFICE VISIT (OUTPATIENT)
Dept: SURGERY | Facility: CLINIC | Age: 76
End: 2021-07-01
Payer: MEDICARE

## 2021-07-01 ENCOUNTER — TELEPHONE (OUTPATIENT)
Dept: SURGERY | Facility: CLINIC | Age: 76
End: 2021-07-01

## 2021-07-01 VITALS — WEIGHT: 124 LBS | HEIGHT: 63 IN | BODY MASS INDEX: 21.97 KG/M2

## 2021-07-01 DIAGNOSIS — K44.9 PARAESOPHAGEAL HERNIA: Primary | ICD-10-CM

## 2021-07-01 PROCEDURE — 99204 OFFICE O/P NEW MOD 45 MIN: CPT | Performed by: SURGERY

## 2021-07-01 PROCEDURE — 3008F BODY MASS INDEX DOCD: CPT | Performed by: SURGERY

## 2021-07-01 NOTE — TELEPHONE ENCOUNTER
Per yumiko called to schedule a gi emptying study and there are no openings until the day of her surgery. Per daughter was told by central scheduling that the order needs to be changed to a stat order.  Please advise

## 2021-07-02 NOTE — H&P
HPI:    Patient ID: Darwin Lao is a 78year old female presenting with Patient presents with:  Hernia: Pt referred by Dr. Duke Living regarding hernia for yrs. Pt states over the past month she's been bloated,diarrhea and back pain.   Pt has been on soft di Sleep Concern: Not Asked        Stress Concern: Not Asked        Weight Concern: Not Asked        Special Diet: Not Asked        Back Care: Not Asked        Exercise: Not Asked        Bike Helmet: Not Asked        Seat Belt: Not Asked        Self-Exams: No Outpatient Medications   Medication Sig Dispense Refill   • omeprazole 20 MG Oral Capsule Delayed Release Take 1 capsule (20 mg total) by mouth every morning.  30 capsule 3   • MI-ACID GAS RELIEF 80 MG Oral Chew Tab CHEW AND SWALLOW ONE TABLET BY MOUTH THRE Head: Normocephalic and atraumatic. Cardiovascular:      Rate and Rhythm: Normal rate and regular rhythm. Pulmonary:      Effort: Pulmonary effort is normal.      Breath sounds: Normal breath sounds. Abdominal:      General: There is no distension.

## 2021-07-03 ENCOUNTER — LAB ENCOUNTER (OUTPATIENT)
Dept: LAB | Facility: HOSPITAL | Age: 76
End: 2021-07-03
Attending: SURGERY
Payer: MEDICARE

## 2021-07-03 DIAGNOSIS — K44.9 PARAESOPHAGEAL HERNIA: ICD-10-CM

## 2021-07-03 LAB — SARS-COV-2 RNA RESP QL NAA+PROBE: NOT DETECTED

## 2021-07-06 ENCOUNTER — HOSPITAL ENCOUNTER (OUTPATIENT)
Dept: GENERAL RADIOLOGY | Facility: HOSPITAL | Age: 76
Discharge: HOME | End: 2021-07-06
Attending: SURGERY
Payer: MEDICARE

## 2021-07-06 DIAGNOSIS — K44.9 PARAESOPHAGEAL HERNIA: ICD-10-CM

## 2021-07-06 PROCEDURE — 74240 X-RAY XM UPR GI TRC 1CNTRST: CPT | Performed by: SURGERY

## 2021-07-19 ENCOUNTER — HOSPITAL ENCOUNTER (OUTPATIENT)
Dept: NUCLEAR MEDICINE | Facility: HOSPITAL | Age: 76
Discharge: HOME | End: 2021-07-19
Attending: SURGERY
Payer: MEDICARE

## 2021-07-19 ENCOUNTER — TELEPHONE (OUTPATIENT)
Dept: SURGERY | Facility: CLINIC | Age: 76
End: 2021-07-19

## 2021-07-19 DIAGNOSIS — K44.9 PARAESOPHAGEAL HERNIA: ICD-10-CM

## 2021-07-19 DIAGNOSIS — K44.9 HIATAL HERNIA: Primary | ICD-10-CM

## 2021-07-19 PROCEDURE — 78264 GASTRIC EMPTYING IMG STUDY: CPT | Performed by: SURGERY

## 2021-07-20 ENCOUNTER — TELEPHONE (OUTPATIENT)
Dept: GASTROENTEROLOGY | Facility: CLINIC | Age: 76
End: 2021-07-20

## 2021-07-20 NOTE — TELEPHONE ENCOUNTER
Patient states she was scheduled for a hernia repair tomorrow with Dr. Farhan Reyes but he cancelled her surgery after seeing her gastric emptying  study results and advised her to follow up with Dr. Cata Ridley instead to start on a new medication.   I scheduled a follow

## 2021-07-20 NOTE — TELEPHONE ENCOUNTER
Pt states that she was supposed to have surgery with Dr. Keegan Sr tomorrow but it was cancelled and pt was told to speak to Dr. Mark Munoz about a problem with stomach emptying. Please call. Aware Dr. Mark Munoz out of office.

## 2021-07-20 NOTE — TELEPHONE ENCOUNTER
Yep this should be ok to discuss at a follow up. Not urgent or worrisome findings.     Yani Samuel MD  [facility] - Gastroenterology  7/20/2021  11:40 AM

## 2021-08-02 NOTE — TELEPHONE ENCOUNTER
Noted.  Thank you.     Future Appointments   Date Time Provider Golden Salazar   8/4/2021  8:30 AM Rommel Asher MD [facility]   11/16/2021  9:30 AM [facility]

## 2021-08-03 ENCOUNTER — TELEPHONE (OUTPATIENT)
Dept: FAMILY MEDICINE CLINIC | Facility: CLINIC | Age: 76
End: 2021-08-03

## 2021-08-03 DIAGNOSIS — K21.9 GASTROESOPHAGEAL REFLUX DISEASE, UNSPECIFIED WHETHER ESOPHAGITIS PRESENT: ICD-10-CM

## 2021-08-03 DIAGNOSIS — R10.13 EPIGASTRIC PAIN: ICD-10-CM

## 2021-08-03 DIAGNOSIS — R14.0 ABDOMINAL BLOATING: Primary | ICD-10-CM

## 2021-08-03 DIAGNOSIS — R19.7 DIARRHEA, UNSPECIFIED TYPE: ICD-10-CM

## 2021-08-04 ENCOUNTER — OFFICE VISIT (OUTPATIENT)
Dept: GASTROENTEROLOGY | Facility: CLINIC | Age: 76
End: 2021-08-04
Payer: MEDICARE

## 2021-08-04 VITALS
BODY MASS INDEX: 21.73 KG/M2 | TEMPERATURE: 99 F | WEIGHT: 122.63 LBS | HEART RATE: 62 BPM | HEIGHT: 63 IN | DIASTOLIC BLOOD PRESSURE: 57 MMHG | SYSTOLIC BLOOD PRESSURE: 113 MMHG

## 2021-08-04 DIAGNOSIS — K21.9 GASTROESOPHAGEAL REFLUX DISEASE, UNSPECIFIED WHETHER ESOPHAGITIS PRESENT: ICD-10-CM

## 2021-08-04 DIAGNOSIS — K44.9 HIATAL HERNIA: Primary | ICD-10-CM

## 2021-08-04 PROCEDURE — 3074F SYST BP LT 130 MM HG: CPT | Performed by: INTERNAL MEDICINE

## 2021-08-04 PROCEDURE — 99213 OFFICE O/P EST LOW 20 MIN: CPT | Performed by: INTERNAL MEDICINE

## 2021-08-04 PROCEDURE — 3078F DIAST BP <80 MM HG: CPT | Performed by: INTERNAL MEDICINE

## 2021-08-04 PROCEDURE — 3008F BODY MASS INDEX DOCD: CPT | Performed by: INTERNAL MEDICINE

## 2021-08-04 NOTE — PROGRESS NOTES
Orvel Degree is a 78year old female. HPI:   Patient presents with:   Follow - Up    The patient is a 19-year-old female who has a history of asthma, thyroid disorder, high blood pressure and cholesterol who also has a history of hiatal hernia with progr drinks    Drug use: No       Medications (Active prior to today's visit):  Current Outpatient Medications   Medication Sig Dispense Refill   • omeprazole 20 MG Oral Capsule Delayed Release Take 1 capsule (20 mg total) by mouth every morning.  30 capsule 3 denies any chest pain or shortness of breath,  No neurologic or dermatologic symptoms.      PHYSICAL EXAM:   Blood pressure 113/57, pulse 62, temperature 98.6 °F (37 °C), temperature source Oral, height 5' 3\" (1.6 m), weight 122 lb 9.6 oz (55.6 kg), not cu

## 2021-08-06 ENCOUNTER — TELEPHONE (OUTPATIENT)
Dept: GASTROENTEROLOGY | Facility: CLINIC | Age: 76
End: 2021-08-06

## 2021-08-06 DIAGNOSIS — K31.84 GASTROPARESIS: Primary | ICD-10-CM

## 2021-08-06 RX ORDER — METOCLOPRAMIDE 5 MG/1
5 TABLET ORAL
Qty: 60 TABLET | Refills: 0 | Status: SHIPPED | OUTPATIENT
Start: 2021-08-06 | End: 2021-08-24

## 2021-08-06 NOTE — TELEPHONE ENCOUNTER
Patient requesting to speak with nurse, patient was seen in clinic on 8/4 and was informed to call today for plan of care regarding surgery needed. Please call at [phone],DEANDRA.

## 2021-08-06 NOTE — TELEPHONE ENCOUNTER
The patient was contacted, I did discuss her case with Dr. Jade Broderick her surgeon. Dr. Jade Broderick would like a trial of treatment of the gastroparesis before consideration for surgery of the hiatal hernia.   We discussed options for treatment and plan a trial of low-dos

## 2021-08-06 NOTE — TELEPHONE ENCOUNTER
Dr. Yao Etienne    Patient told me that she was instructed to call you on Friday. Wanting to know if you spoke to Dr. Victoria Lieberman about plan of care.     Patient reports she has been miserable for over a month because her surgery was canceled the day before because

## 2021-08-09 NOTE — TELEPHONE ENCOUNTER
Patient contacted. She reports no side effects - she started reglan on Saturday. She is not seeing much improvement in symptoms yet. I reviewed foods to avoid with gastroparesis and patient had been eating some of these.   I advised that she provide

## 2021-08-11 ENCOUNTER — TELEPHONE (OUTPATIENT)
Dept: GASTROENTEROLOGY | Facility: CLINIC | Age: 76
End: 2021-08-11

## 2021-08-25 ENCOUNTER — LAB ENCOUNTER (OUTPATIENT)
Dept: LAB | Age: 76
End: 2021-08-25
Attending: FAMILY MEDICINE
Payer: MEDICARE

## 2021-08-25 DIAGNOSIS — R10.13 EPIGASTRIC PAIN: ICD-10-CM

## 2021-08-25 PROCEDURE — 83013 H PYLORI (C-13) BREATH: CPT

## 2021-08-27 LAB — H. PYLORI BREATH TEST: NEGATIVE

## 2021-09-29 DIAGNOSIS — E78.2 MIXED HYPERLIPIDEMIA: ICD-10-CM

## 2021-09-30 RX ORDER — ROSUVASTATIN CALCIUM 40 MG/1
40 TABLET, COATED ORAL EVERY EVENING
Qty: 90 TABLET | Refills: 1 | Status: SHIPPED | OUTPATIENT
Start: 2021-09-30 | End: 2021-11-16

## 2021-09-30 NOTE — TELEPHONE ENCOUNTER
Refill passed per [address] protocol. Requested Prescriptions   Pending Prescriptions Disp Refills    ROSUVASTATIN 40 MG Oral Tab [Pharmacy Med Name: Rosuvastatin Calcium 40 Mg Tab Nort] 90 tablet 0     Sig: Take 1 tablet by mouth every evening.

## 2021-10-02 RX ORDER — CHOLECALCIFEROL (VITAMIN D3) 125 MCG
CAPSULE ORAL
Qty: 90 TABLET | Refills: 0 | Status: SHIPPED | OUTPATIENT
Start: 2021-10-02 | End: 2021-12-31

## 2021-10-04 ENCOUNTER — OFFICE VISIT (OUTPATIENT)
Dept: FAMILY MEDICINE CLINIC | Facility: CLINIC | Age: 76
End: 2021-10-04
Payer: MEDICARE

## 2021-10-04 VITALS
BODY MASS INDEX: 20.83 KG/M2 | WEIGHT: 122 LBS | DIASTOLIC BLOOD PRESSURE: 66 MMHG | HEART RATE: 65 BPM | HEIGHT: 64 IN | SYSTOLIC BLOOD PRESSURE: 104 MMHG | TEMPERATURE: 97 F

## 2021-10-04 DIAGNOSIS — M67.471 GANGLION CYST OF RIGHT FOOT: Primary | ICD-10-CM

## 2021-10-04 DIAGNOSIS — R14.0 ABDOMINAL BLOATING: ICD-10-CM

## 2021-10-04 PROCEDURE — 3008F BODY MASS INDEX DOCD: CPT | Performed by: FAMILY MEDICINE

## 2021-10-04 PROCEDURE — 3078F DIAST BP <80 MM HG: CPT | Performed by: FAMILY MEDICINE

## 2021-10-04 PROCEDURE — 3074F SYST BP LT 130 MM HG: CPT | Performed by: FAMILY MEDICINE

## 2021-10-04 PROCEDURE — 99213 OFFICE O/P EST LOW 20 MIN: CPT | Performed by: FAMILY MEDICINE

## 2021-10-04 NOTE — PROGRESS NOTES
Patient presents with: Foot Injury: c/o hard mass on right foot, tender to palpation    HPI:   Yousif Pizarro is a 78year old female who presents to clinic with complaints of two day history of painful cyst/ lump on border of R foot.  Only painful with appl

## 2021-10-15 RX ORDER — OMEPRAZOLE 20 MG/1
20 CAPSULE, DELAYED RELEASE ORAL EVERY MORNING
Qty: 30 CAPSULE | Refills: 0 | Status: SHIPPED | OUTPATIENT
Start: 2021-10-15 | End: 2021-11-16

## 2021-10-26 ENCOUNTER — OFFICE VISIT (OUTPATIENT)
Dept: PODIATRY CLINIC | Facility: CLINIC | Age: 76
End: 2021-10-26
Payer: MEDICARE

## 2021-10-26 ENCOUNTER — HOSPITAL ENCOUNTER (OUTPATIENT)
Dept: GENERAL RADIOLOGY | Age: 76
Discharge: HOME | End: 2021-10-26
Attending: PODIATRIST
Payer: MEDICARE

## 2021-10-26 DIAGNOSIS — M77.51 BURSITIS OF RIGHT FOOT: ICD-10-CM

## 2021-10-26 DIAGNOSIS — M79.671 RIGHT FOOT PAIN: ICD-10-CM

## 2021-10-26 DIAGNOSIS — M79.671 RIGHT FOOT PAIN: Primary | ICD-10-CM

## 2021-10-26 DIAGNOSIS — M77.41 METATARSALGIA OF RIGHT FOOT: ICD-10-CM

## 2021-10-26 PROCEDURE — 73630 X-RAY EXAM OF FOOT: CPT | Performed by: PODIATRIST

## 2021-10-26 PROCEDURE — 99203 OFFICE O/P NEW LOW 30 MIN: CPT | Performed by: PODIATRIST

## 2021-10-26 PROCEDURE — L4386 NON-PNEUM WALK BOOT PRE CST: HCPCS | Performed by: PODIATRIST

## 2021-10-26 NOTE — PROGRESS NOTES
[facility]  Progress Note    Soomaryam Garcia is a 78year old female.  Patient presents with:  Consult: Pt here w/ c/o bump for 1mo on lateral Right foot, painful to touch,         HPI:     This is a pleasant female that presents to clinic today • CATARACT EXTRACTION W/  INTRAOCULAR LENS IMPLANT Right 3/2/17    Dr. Anjel Anne @ [place]   • CATARACT EXTRACTION W/  INTRAOCULAR LENS IMPLANT Left 6/12/17    Dr. Anjel Anne @ [address] [place] [address]     • COLONOSCOPY     • EGD  2019   • HYSTERECTOMY        Famil examination:  Muscle Strength is 5/5 Right foot without pain    Moderate POP to right 5th met base      LABS & IMAGING:     Lab Results   Component Value Date    GLU 91 06/05/2021    BUN 10 06/05/2021    CREATSERUM 0.74 06/05/2021    BUNCREA 13.5 06/05/202

## 2021-11-08 RX ORDER — LEVOTHYROXINE SODIUM 0.07 MG/1
75 TABLET ORAL
Qty: 90 TABLET | Refills: 1 | Status: SHIPPED | OUTPATIENT
Start: 2021-11-08

## 2021-11-08 NOTE — TELEPHONE ENCOUNTER
Please review.  Protocol failed/No protocol      Requested Prescriptions   Pending Prescriptions Disp Refills    LEVOTHYROXINE 75 MCG Oral Tab [Pharmacy Med Name: Levothyroxine Sodium 75 Mcg Tab Lupi] 90 tablet 0     Sig: Take 1 tablet by mouth before break

## 2021-11-12 NOTE — TELEPHONE ENCOUNTER
Advancement-Rotation Flap Text: The defect edges were debeveled with a #15 scalpel blade.  Given the location of the defect, shape of the defect and the proximity to free margins an advancement-rotation flap was deemed most appropriate.  Using a sterile surgical marker, an appropriate flap was drawn incorporating the defect and placing the expected incisions within the relaxed skin tension lines where possible. The area thus outlined was incised deep to adipose tissue with a #15 scalpel blade.  The skin margins were undermined to an appropriate distance in all directions utilizing iris scissors. Rec'd call from Dr. Jose Fontaine to cancel surgery d/t nuc med results. Dr. Jose Fontaine spoke to Patient.

## 2021-11-16 ENCOUNTER — OFFICE VISIT (OUTPATIENT)
Dept: FAMILY MEDICINE CLINIC | Facility: CLINIC | Age: 76
End: 2021-11-16
Payer: MEDICARE

## 2021-11-16 VITALS
SYSTOLIC BLOOD PRESSURE: 112 MMHG | WEIGHT: 119 LBS | BODY MASS INDEX: 20.32 KG/M2 | HEART RATE: 69 BPM | TEMPERATURE: 98 F | DIASTOLIC BLOOD PRESSURE: 67 MMHG | HEIGHT: 64 IN

## 2021-11-16 DIAGNOSIS — K21.9 GASTROESOPHAGEAL REFLUX DISEASE, UNSPECIFIED WHETHER ESOPHAGITIS PRESENT: ICD-10-CM

## 2021-11-16 DIAGNOSIS — I10 ESSENTIAL HYPERTENSION: ICD-10-CM

## 2021-11-16 DIAGNOSIS — L23.1 ALLERGIC CONTACT DERMATITIS DUE TO ADHESIVES: ICD-10-CM

## 2021-11-16 DIAGNOSIS — Z13.1 SCREENING FOR DIABETES MELLITUS (DM): ICD-10-CM

## 2021-11-16 DIAGNOSIS — J44.9 ASTHMA WITH COPD (CHRONIC OBSTRUCTIVE PULMONARY DISEASE) (HCC): ICD-10-CM

## 2021-11-16 DIAGNOSIS — K42.9 UMBILICAL HERNIA WITHOUT OBSTRUCTION AND WITHOUT GANGRENE: ICD-10-CM

## 2021-11-16 DIAGNOSIS — Z23 NEEDS FLU SHOT: ICD-10-CM

## 2021-11-16 DIAGNOSIS — E03.9 HYPOTHYROIDISM, UNSPECIFIED TYPE: ICD-10-CM

## 2021-11-16 DIAGNOSIS — Z23 NEED FOR SHINGLES VACCINE: ICD-10-CM

## 2021-11-16 DIAGNOSIS — Z00.00 ENCOUNTER FOR ANNUAL HEALTH EXAMINATION: Primary | ICD-10-CM

## 2021-11-16 DIAGNOSIS — K44.9 HIATAL HERNIA: ICD-10-CM

## 2021-11-16 DIAGNOSIS — E46 PROTEIN-CALORIE MALNUTRITION, UNSPECIFIED SEVERITY (HCC): ICD-10-CM

## 2021-11-16 DIAGNOSIS — K22.2 SCHATZKI'S RING: ICD-10-CM

## 2021-11-16 DIAGNOSIS — M85.80 OSTEOPENIA, UNSPECIFIED LOCATION: ICD-10-CM

## 2021-11-16 DIAGNOSIS — Z13.6 SCREENING FOR CARDIOVASCULAR CONDITION: ICD-10-CM

## 2021-11-16 DIAGNOSIS — K44.9 PARAESOPHAGEAL HERNIA: ICD-10-CM

## 2021-11-16 DIAGNOSIS — F32.9 REACTIVE DEPRESSION: ICD-10-CM

## 2021-11-16 DIAGNOSIS — K29.50 CHRONIC GASTRITIS WITHOUT BLEEDING, UNSPECIFIED GASTRITIS TYPE: ICD-10-CM

## 2021-11-16 DIAGNOSIS — I70.0 ATHEROSCLEROSIS OF AORTA (HCC): ICD-10-CM

## 2021-11-16 DIAGNOSIS — E87.6 HYPOKALEMIA: ICD-10-CM

## 2021-11-16 DIAGNOSIS — R91.1 NODULE OF LEFT LUNG: ICD-10-CM

## 2021-11-16 DIAGNOSIS — E78.2 MIXED HYPERLIPIDEMIA: ICD-10-CM

## 2021-11-16 PROCEDURE — 96160 PT-FOCUSED HLTH RISK ASSMT: CPT | Performed by: FAMILY MEDICINE

## 2021-11-16 PROCEDURE — 3008F BODY MASS INDEX DOCD: CPT | Performed by: FAMILY MEDICINE

## 2021-11-16 PROCEDURE — 90662 IIV NO PRSV INCREASED AG IM: CPT | Performed by: FAMILY MEDICINE

## 2021-11-16 PROCEDURE — G0008 ADMIN INFLUENZA VIRUS VAC: HCPCS | Performed by: FAMILY MEDICINE

## 2021-11-16 PROCEDURE — 99397 PER PM REEVAL EST PAT 65+ YR: CPT | Performed by: FAMILY MEDICINE

## 2021-11-16 PROCEDURE — 3078F DIAST BP <80 MM HG: CPT | Performed by: FAMILY MEDICINE

## 2021-11-16 PROCEDURE — G0439 PPPS, SUBSEQ VISIT: HCPCS | Performed by: FAMILY MEDICINE

## 2021-11-16 PROCEDURE — 3074F SYST BP LT 130 MM HG: CPT | Performed by: FAMILY MEDICINE

## 2021-11-16 RX ORDER — OMEPRAZOLE 20 MG/1
20 CAPSULE, DELAYED RELEASE ORAL EVERY MORNING
Qty: 90 CAPSULE | Refills: 1 | Status: SHIPPED | OUTPATIENT
Start: 2021-11-16

## 2021-11-16 RX ORDER — ZOSTER VACCINE RECOMBINANT, ADJUVANTED 50 MCG/0.5
0.5 KIT INTRAMUSCULAR ONCE
Qty: 1 EACH | Refills: 0 | Status: SHIPPED | OUTPATIENT
Start: 2021-11-16 | End: 2021-11-16

## 2021-11-16 RX ORDER — ROSUVASTATIN CALCIUM 5 MG/1
5 TABLET, COATED ORAL NIGHTLY
Qty: 90 TABLET | Refills: 1 | Status: SHIPPED | OUTPATIENT
Start: 2021-11-16

## 2021-11-16 NOTE — PROGRESS NOTES
HPI:   Pro Pastrana is a 78year old female who presents for a Medicare Subsequent Annual Wellness visit (Pt already had Initial Annual Wellness). Patient currently doing well and is on her baseline. Her GI symptoms have improved significantly.   Thin Chronic gastritis without bleeding     Schatzki's ring     Hiatal hernia     Esophageal reflux     Asthma with COPD (chronic obstructive pulmonary disease) (HCC)     Paraesophageal hernia     Protein-calorie malnutrition, unspecified severity ([facility].)    Wt R subcapsular cataract), right (1/6/2015). She  has a past surgical history that includes hysterectomy; Cataract extraction w/  intraocular lens implant (Right, 3/2/17); appendectomy; cholecystectomy;  Cataract extraction w/  intraocular lens implant (Left get annoyed because I misunderstand what they say: No   I misunderstand what others are saying and make inappropriate responses: No I avoid social activities because I cannot hear well and fear I will reply improperly: No   Family members and friends have Refill: 1    3. Protein-calorie malnutrition, unspecified severity ([facility]  -Improved    4. Asthma with COPD (chronic obstructive pulmonary disease) (HCC)  - asthma is mild persistent  Stable, continue present management    5.  Atherosclerosis of aorta ([facility] may decrease if TSH continues to be persistently elevated  - TSH W REFLEX TO FREE T4; Future    14. Hypokalemia  -Stable, will recheck CMP. Lisinopril was started which should help with the potassium  - COMP METABOLIC PANEL (14); Future    15.  Reactive de screening every 12 months if never tested or if previously tested but not diagnosed with pre-diabetes   One screening every 6 months if diagnosed with pre-diabetes Lab Results   Component Value Date    GLU 91 06/05/2021        Cardiovascular Disease Screen for patients aged 35-39 06/24/2021    Mammogram due on 06/24/2022    Immunizations    Influenza Covered once per flu season  Please get every year -  Influenza Vaccine(1) due on 10/01/2021    Pneumococcal Each vaccine ([id number] & [id number]) covered once

## 2021-11-16 NOTE — PATIENT INSTRUCTIONS
Mary Robledo's SCREENING SCHEDULE   Tests on this list are recommended by your physician but may not be covered, or covered at this frequency, by your insurer. Please check with your insurance carrier before scheduling to verify coverage.    PREVENTATIV 02/04/2020      No recommendations at this time   Pap and Pelvic    Pap   Covered every 2 years for women at normal risk;  Annually if at high risk -  No recommendations at this time    Chlamydia Annually if high risk -  No recommendations at this time   Sc the [employer]. http://www. idph.[facility]. [place]/public/books/advin.htm  A link to the [employer]. This site has a lot of good information including definitions of the different types of Advance Directives.  It

## 2021-11-22 ENCOUNTER — LAB ENCOUNTER (OUTPATIENT)
Dept: LAB | Age: 76
End: 2021-11-22
Attending: FAMILY MEDICINE
Payer: MEDICARE

## 2021-11-22 DIAGNOSIS — E87.6 HYPOKALEMIA: ICD-10-CM

## 2021-11-22 DIAGNOSIS — Z13.6 SCREENING FOR CARDIOVASCULAR CONDITION: ICD-10-CM

## 2021-11-22 DIAGNOSIS — Z13.1 SCREENING FOR DIABETES MELLITUS (DM): ICD-10-CM

## 2021-11-22 DIAGNOSIS — E78.2 MIXED HYPERLIPIDEMIA: ICD-10-CM

## 2021-11-22 DIAGNOSIS — I10 ESSENTIAL HYPERTENSION: ICD-10-CM

## 2021-11-22 DIAGNOSIS — Z00.00 ENCOUNTER FOR ANNUAL HEALTH EXAMINATION: ICD-10-CM

## 2021-11-22 DIAGNOSIS — E03.9 HYPOTHYROIDISM, UNSPECIFIED TYPE: ICD-10-CM

## 2021-11-22 PROCEDURE — 36415 COLL VENOUS BLD VENIPUNCTURE: CPT

## 2021-11-22 PROCEDURE — 84481 FREE ASSAY (FT-3): CPT

## 2021-11-22 PROCEDURE — 84443 ASSAY THYROID STIM HORMONE: CPT

## 2021-11-22 PROCEDURE — 84439 ASSAY OF FREE THYROXINE: CPT

## 2021-11-22 PROCEDURE — 83036 HEMOGLOBIN GLYCOSYLATED A1C: CPT

## 2021-11-22 PROCEDURE — 85025 COMPLETE CBC W/AUTO DIFF WBC: CPT

## 2021-11-22 PROCEDURE — 80053 COMPREHEN METABOLIC PANEL: CPT

## 2021-11-22 PROCEDURE — 80061 LIPID PANEL: CPT

## 2021-12-29 DIAGNOSIS — I10 ESSENTIAL HYPERTENSION: ICD-10-CM

## 2021-12-30 RX ORDER — LISINOPRIL 10 MG/1
TABLET ORAL
Qty: 90 TABLET | Refills: 0 | Status: SHIPPED | OUTPATIENT
Start: 2021-12-30

## 2021-12-31 ENCOUNTER — HOSPITAL ENCOUNTER (OUTPATIENT)
Age: 76
Discharge: HOME | End: 2021-12-31
Payer: MEDICARE

## 2021-12-31 VITALS
TEMPERATURE: 98 F | HEART RATE: 70 BPM | OXYGEN SATURATION: 97 % | SYSTOLIC BLOOD PRESSURE: 144 MMHG | DIASTOLIC BLOOD PRESSURE: 71 MMHG | RESPIRATION RATE: 18 BRPM

## 2021-12-31 DIAGNOSIS — J06.9 UPPER RESPIRATORY VIRUS: Primary | ICD-10-CM

## 2021-12-31 DIAGNOSIS — Z20.822 ENCOUNTER FOR LABORATORY TESTING FOR COVID-19 VIRUS: ICD-10-CM

## 2021-12-31 PROCEDURE — 99213 OFFICE O/P EST LOW 20 MIN: CPT

## 2021-12-31 RX ORDER — CHOLECALCIFEROL (VITAMIN D3) 125 MCG
CAPSULE ORAL
Qty: 90 TABLET | Refills: 0 | Status: SHIPPED | OUTPATIENT
Start: 2021-12-31

## 2021-12-31 NOTE — ED PROVIDER NOTES
Patient presents with: Body ache and/or chills      HPI:     Jaswinder November is a 78year old female who presents for dry cough for the past 2 days. No fevers. She has had some chills. No difficulty breathing. No chest pain.   No swelling to her lower ext Not Asked        Seat Belt: Not Asked        Self-Exams: Not Asked        Grew up on a farm: Not Asked        History of tanning: Not Asked        Outdoor occupation: Not Asked        Pt has a pacemaker: No        Pt has a defibrillator: No        Breast f result. Diagnosis:    ICD-10-CM    1. Upper respiratory virus  J06.9    2. Encounter for laboratory testing for COVID-19 virus  Z20.822        All results reviewed and discussed with patient.   See AVS for detailed discharge instructions for your conditi

## 2022-01-04 ENCOUNTER — TELEMEDICINE (OUTPATIENT)
Dept: FAMILY MEDICINE CLINIC | Facility: CLINIC | Age: 77
End: 2022-01-04
Payer: MEDICARE

## 2022-01-04 DIAGNOSIS — U07.1 COVID: Primary | ICD-10-CM

## 2022-01-04 LAB — SARS-COV-2 RNA,QUAL, RT-PCR: DETECTED

## 2022-01-04 PROCEDURE — 99213 OFFICE O/P EST LOW 20 MIN: CPT | Performed by: FAMILY MEDICINE

## 2022-01-04 NOTE — PROGRESS NOTES
Virtual Telephone Check-In    Yessenia Blancas verbally consents to a Virtual/Telephone Check-In service on 01/04/22. Patient understands and accepts financial responsibility for any deductible, co-insurance and/or co-pays associated with this service.     D

## 2022-01-24 PROBLEM — I73.9 PAD (PERIPHERAL ARTERY DISEASE) (HCC): Status: ACTIVE | Noted: 2022-01-24

## 2022-01-24 PROBLEM — I73.9 PAD (PERIPHERAL ARTERY DISEASE): Status: ACTIVE | Noted: 2022-01-24

## 2022-02-04 ENCOUNTER — LAB ENCOUNTER (OUTPATIENT)
Dept: LAB | Age: 77
End: 2022-02-04
Attending: FAMILY MEDICINE
Payer: MEDICARE

## 2022-02-04 ENCOUNTER — HOSPITAL ENCOUNTER (OUTPATIENT)
Dept: GENERAL RADIOLOGY | Age: 77
Discharge: HOME | End: 2022-02-04
Attending: FAMILY MEDICINE
Payer: MEDICARE

## 2022-02-04 ENCOUNTER — OFFICE VISIT (OUTPATIENT)
Dept: FAMILY MEDICINE CLINIC | Facility: CLINIC | Age: 77
End: 2022-02-04
Payer: MEDICARE

## 2022-02-04 VITALS
BODY MASS INDEX: 20.49 KG/M2 | HEART RATE: 63 BPM | SYSTOLIC BLOOD PRESSURE: 107 MMHG | TEMPERATURE: 98 F | WEIGHT: 120 LBS | HEIGHT: 64 IN | DIASTOLIC BLOOD PRESSURE: 68 MMHG

## 2022-02-04 DIAGNOSIS — M25.551 RIGHT HIP PAIN: Primary | ICD-10-CM

## 2022-02-04 DIAGNOSIS — E78.2 MIXED HYPERLIPIDEMIA: ICD-10-CM

## 2022-02-04 DIAGNOSIS — M79.604 RIGHT LEG PAIN: ICD-10-CM

## 2022-02-04 DIAGNOSIS — E03.9 HYPOTHYROIDISM, UNSPECIFIED TYPE: ICD-10-CM

## 2022-02-04 DIAGNOSIS — I73.9 PAD (PERIPHERAL ARTERY DISEASE) (HCC): ICD-10-CM

## 2022-02-04 DIAGNOSIS — M25.551 RIGHT HIP PAIN: ICD-10-CM

## 2022-02-04 LAB
CHOLEST SERPL-MCNC: 211 MG/DL (ref ?–200)
FASTING PATIENT LIPID ANSWER: NO
HDLC SERPL-MCNC: 54 MG/DL (ref 40–59)
LDLC SERPL CALC-MCNC: 123 MG/DL (ref ?–100)
NONHDLC SERPL-MCNC: 157 MG/DL (ref ?–130)
T4 FREE SERPL-MCNC: 1 NG/DL (ref 0.8–1.7)
TRIGL SERPL-MCNC: 193 MG/DL (ref 30–149)
TSI SER-ACNC: 4.01 MIU/ML (ref 0.36–3.74)
VLDLC SERPL CALC-MCNC: 34 MG/DL (ref 0–30)

## 2022-02-04 PROCEDURE — 84443 ASSAY THYROID STIM HORMONE: CPT

## 2022-02-04 PROCEDURE — 3008F BODY MASS INDEX DOCD: CPT | Performed by: FAMILY MEDICINE

## 2022-02-04 PROCEDURE — 3074F SYST BP LT 130 MM HG: CPT | Performed by: FAMILY MEDICINE

## 2022-02-04 PROCEDURE — 99214 OFFICE O/P EST MOD 30 MIN: CPT | Performed by: FAMILY MEDICINE

## 2022-02-04 PROCEDURE — 84439 ASSAY OF FREE THYROXINE: CPT

## 2022-02-04 PROCEDURE — 36415 COLL VENOUS BLD VENIPUNCTURE: CPT

## 2022-02-04 PROCEDURE — 73502 X-RAY EXAM HIP UNI 2-3 VIEWS: CPT | Performed by: FAMILY MEDICINE

## 2022-02-04 PROCEDURE — 3078F DIAST BP <80 MM HG: CPT | Performed by: FAMILY MEDICINE

## 2022-02-04 PROCEDURE — 80061 LIPID PANEL: CPT

## 2022-02-07 ENCOUNTER — TELEPHONE (OUTPATIENT)
Dept: FAMILY MEDICINE CLINIC | Facility: CLINIC | Age: 77
End: 2022-02-07

## 2022-02-07 NOTE — TELEPHONE ENCOUNTER
Please let pt know she has a bone spur in the affected hip that could be contributing to pain but otherwise no significant arthritis. Would have her see Dr. Yulisa Vigil as I think this is more musculoskeletal. Please provide with referral info. He can help us determine if PT or MRI would be helpful.

## 2022-02-07 NOTE — TELEPHONE ENCOUNTER
Pt stated she was seen Friday , had lab test and hip xray , reviewed but don't understand results, advised Dr have not address them yet, stated her hip still hurts and want to know the next step

## 2022-02-10 NOTE — TELEPHONE ENCOUNTER
Please inform patient that her cholesterol is elevated, should be taking her statin nightly. This can also help prevent worsening of the peripheral artery disease. Her thyroid testing shows that she could use a little bit extra medication. I think she should go back to taking 75 mcg every other day, on the other days she can continue to take half of the tablet. She is currently only taking half of the 75 mcg tablet.

## 2022-03-15 ENCOUNTER — TELEPHONE (OUTPATIENT)
Dept: FAMILY MEDICINE CLINIC | Facility: CLINIC | Age: 77
End: 2022-03-15

## 2022-03-15 NOTE — TELEPHONE ENCOUNTER
Patient is requesting a referral for general surgeon Dr. Nora Nicolas.  Patient will be scheduling an appointment for ongoing treatment of her hernia

## 2022-03-31 RX ORDER — CHOLECALCIFEROL (VITAMIN D3) 125 MCG
CAPSULE ORAL
Qty: 90 TABLET | Refills: 1 | Status: SHIPPED | OUTPATIENT
Start: 2022-03-31

## 2022-03-31 NOTE — TELEPHONE ENCOUNTER
Please review. Protocol failed / No protocol. Requested Prescriptions   Pending Prescriptions Disp Refills    VITAMIN D3, CHOLECALCIFEROL, 50 MCG (2000 UT) Oral Tab [Pharmacy Med Name: Vitamin D3 2,000 Unit Tab Romina] 90 tablet 0     Sig: TAKE ONE TABLET BY MOUTH ONE TIME DAILY        There is no refill protocol information for this order             Recent Outpatient Visits              1 month ago Right hip pain    [address] Terri Bishop MD    Office Visit    2 months ago [facility], Terri Hayes MD    Telemedicine    4 months ago Encounter for annual health examination    [address] MD    Office Visit    5 months ago Right foot pain    [facility].  [facility] [place] Brigida Cramer [facility]    Office Visit    5 months ago Ganglion cyst of right foot    [address] Terri Bishop MD    Office Visit

## 2022-04-04 NOTE — TELEPHONE ENCOUNTER
This RN spoke with patient who indicates she has been waiting 3 weeks for update on referral for Dr. Luh Ma needs to schedule an appointment for follow up on hiatial hernia as symptoms are worsening. Despite documented call on 3/15 regarding referral request, no referral previously entered/pended.     Routing to provider as high priority as patient has been unable to schedule follow up with specialist without referral.

## 2022-04-04 NOTE — TELEPHONE ENCOUNTER
Marco Antonio Burdick MD [facility] 71. [address] [phone]     Called patient and advised her of referral placed. Verbalized understanding.

## 2022-04-05 ENCOUNTER — TELEPHONE (OUTPATIENT)
Dept: FAMILY MEDICINE CLINIC | Facility: CLINIC | Age: 77
End: 2022-04-05

## 2022-04-05 NOTE — TELEPHONE ENCOUNTER
Senait Castellon pt saw Dr. Jason Mendoza today and he wants her to see GI . Pt stated that she needs a referral so she can see  pt was not sure the reason she needs to see . Pt said that all she know is that Dr. Jason Mendoza told her he can not do anything until she sees Dr Brad Saini.

## 2022-04-06 NOTE — TELEPHONE ENCOUNTER
Call received from daughter of patient who is calling to follow up on urgent referral request due to mother's ongoing symptoms: diarrhea, nausea, abdominal discomfort, etc.   Patient was advised of need for evaluation by GI (urgently) by the general surgeon Dr. Carmen Capone she saw yesterday. Daughter and patient eager to have referral processed and appreciate assistance. Routing to in -office provider as Dr. Britany Pedro is not in office today. Pended referral updated to reflect diagnosis of gastroparesis as noted in Dr. Carmen Capone office visit note.

## 2022-04-08 ENCOUNTER — TELEPHONE (OUTPATIENT)
Dept: GASTROENTEROLOGY | Facility: CLINIC | Age: 77
End: 2022-04-08

## 2022-04-08 NOTE — TELEPHONE ENCOUNTER
Noted-  Staff who can open/approve spots gone for the day. Will ask Everett Hubbard on Monday to assist with opening appointment on Tuesday for this patient.

## 2022-04-08 NOTE — TELEPHONE ENCOUNTER
Pts daughter Paul Edwards states that pt is having problems with her hiatel hernia. Pt is having a lot of bloating and abdomen and back pain. Please call.

## 2022-04-08 NOTE — TELEPHONE ENCOUNTER
Pt daughter was called and informed to call Gi as the referral has been approved. Daughter stated that she will give them a call on her lunch break as she is at work right now. GI Clinical staff Please see Sonia Mancilla message below. Pt needs to be evaluated urgently advised by the General Surgeon Dr. Kanwal Pablo.

## 2022-04-08 NOTE — TELEPHONE ENCOUNTER
Dr. Mariah Ochoa    I spoke to [place]. She told em that patient has had bloating and decreased appetite for a year now. She was going to have surgery with Dr. Nandini Weathers, but it was canceled. She just saw Hai Dsouza in office who referred her back to Gi. She wants to know what her mother can do next because she has been pretty miserable. She has some nausea, but no vomiting. Sometimes she will have diarrhea at night. We received a request from PCP for sooner appointment-please let me know when patient may be added. Per Dr. Luh Ma office note patient diagnosed with gastroparesis. I reviewed to eat smaller more frequent meals. Avoid dairy, carbonated drinks, raw veggies like brussels sprouts, lettuce, green beans, raw fruits that are more difficult to digest like apples/oranges, nuts and seeds. Given suggestions of foods that are ok to eat. Advised to avoid eating before bedtime as well.     Please advise    Thank you

## 2022-04-08 NOTE — TELEPHONE ENCOUNTER
Dr. Corky Nuñez    PCP/Dr. Amadeo Oconnor requesting patient be see by Gi ASAP. Please advise when she may be added. First available MD approval for Wed am isn't until 4/20/22.     Thank you

## 2022-04-11 NOTE — TELEPHONE ENCOUNTER
Patient contacted. Accepted appointment with Dr. Nikhil Brand tomorrow in office. Given detailed directions to [employer] office.     Your Appointments    Tuesday April 12, 2022  3:45 PM  Follow Up Visit with Mahsa Valencia MD  [facility], [facility], [address] [place] [address],[address], [facility] ([address]) [address] [facility]  [phone]

## 2022-04-12 ENCOUNTER — OFFICE VISIT (OUTPATIENT)
Dept: GASTROENTEROLOGY | Facility: CLINIC | Age: 77
End: 2022-04-12
Payer: MEDICARE

## 2022-04-12 VITALS
DIASTOLIC BLOOD PRESSURE: 63 MMHG | HEIGHT: 63 IN | SYSTOLIC BLOOD PRESSURE: 116 MMHG | BODY MASS INDEX: 20.77 KG/M2 | HEART RATE: 62 BPM | WEIGHT: 117.19 LBS

## 2022-04-12 DIAGNOSIS — K31.84 GASTROPARESIS: ICD-10-CM

## 2022-04-12 DIAGNOSIS — R10.13 EPIGASTRIC PAIN: ICD-10-CM

## 2022-04-12 DIAGNOSIS — K44.9 HIATAL HERNIA: Primary | ICD-10-CM

## 2022-04-12 PROCEDURE — 99213 OFFICE O/P EST LOW 20 MIN: CPT | Performed by: INTERNAL MEDICINE

## 2022-04-12 PROCEDURE — 3078F DIAST BP <80 MM HG: CPT | Performed by: INTERNAL MEDICINE

## 2022-04-12 PROCEDURE — 3008F BODY MASS INDEX DOCD: CPT | Performed by: INTERNAL MEDICINE

## 2022-04-12 PROCEDURE — 3074F SYST BP LT 130 MM HG: CPT | Performed by: INTERNAL MEDICINE

## 2022-04-12 RX ORDER — ASPIRIN 81 MG/1
81 TABLET ORAL DAILY
COMMUNITY

## 2022-04-12 NOTE — PATIENT INSTRUCTIONS
Abdominal pain/hiatal hernia  - discussed pain could be from hiatal hernia or gastroparesis  - smaller more frequent meals  - antacid medication as needed /omeprazole  - see GI at [facility] [place] or [facility]    Umbilical hernia  - currently asymptomatic

## 2022-04-13 ENCOUNTER — TELEPHONE (OUTPATIENT)
Dept: GASTROENTEROLOGY | Facility: CLINIC | Age: 77
End: 2022-04-13

## 2022-04-13 NOTE — TELEPHONE ENCOUNTER
RN to assist in setting up appointment with either [facility] ( Dr. Pelon Mullins or Dr. Saran Card ) or [facility] ( Dr. Angela Leslie) for evaluation of abdominal pain and possible hiatal hernia repair.    Unsure if insurance issue

## 2022-04-13 NOTE — TELEPHONE ENCOUNTER
Agree to have the pt seen at [facility] and would advise surgical consultation ( ok to ask primary about referral to Dr. Isaac Godfrey) as well as evaluation by GI group there /Dr. Luisito Jeffery. Please see if both referrals can be placed.

## 2022-04-13 NOTE — TELEPHONE ENCOUNTER
Dr. Janet Vega     Patient requesting to see Dr. Lissette Fernandes       Phone: [phone]; Fax: [phone]       Address: [address] [facility] [address]        Would it be ok to ask PCP for referral to this provider since [facility] can't be placed by our office?     Thank you

## 2022-04-13 NOTE — TELEPHONE ENCOUNTER
Dr. Deepak Daley    Patient has a HMO so referrals need to go through PCP. Dr. Laura Victor is recommending that patient see a surgeon Dr. Milli Mary for evaluation of abdominal pain and possible hiatal hernia repair and Dr. Jag Basurto with Gastroenterology for abdominal pain and hiatal hernia repair. Would you be able to assist with these referrals?     Thank you

## 2022-04-13 NOTE — TELEPHONE ENCOUNTER
Pt's daughter Abbi Schumacher is calling with the name of the surgeon from [place] that they would like pt to be referred to is Dr Tulio Mauro .

## 2022-04-13 NOTE — TELEPHONE ENCOUNTER
Dr. Rae Payan    Patient has an HMO so PCP needs to place referral to the specialist you want her to see and then they would get authorization for the referral.    Please let me know which one you would like her to see (need provider name since HMO can't just put a group). I could then assist with giving information about how to make an appointment.     Thank you

## 2022-04-18 ENCOUNTER — PATIENT MESSAGE (OUTPATIENT)
Dept: GASTROENTEROLOGY | Facility: CLINIC | Age: 77
End: 2022-04-18

## 2022-04-19 NOTE — TELEPHONE ENCOUNTER
Patient contacted and advised to call her insurance to see if those doctors from [address] [place] [address] are in her network and then call her PCP to get referrals. Patient voiced understanding.

## 2022-04-19 NOTE — TELEPHONE ENCOUNTER
From: Etelvina James  To: Joe Sanchez. Linnea Reeves MD  Sent: 4/18/2022 8:20 PM CDT  Subject: Lucas Hull or Jaimie Reeves,    After our visit on Tuesday, April 12th I have not heard back from anyone from your office in regards to an appointment with the specialists that you recommended from Lucas Hull and/or [facility]. I believe we were waiting to see if we can have an appointment with one of the doctors and that your office was going to check and see if insurance would cover that visit. Can you please let me know what the status is on this? Thank you!      Francisco Will

## 2022-04-20 NOTE — TELEPHONE ENCOUNTER
RN Triage    Specialist unable to place referrals for HMO patients. I see encounter routed to managed care, but I do not see that any referrals placed as requested.     Thank you

## 2022-04-20 NOTE — TELEPHONE ENCOUNTER
Triage support, since this message is 9days old and Managed Care has not assisted yet, can you please pend the 2 referrals and route to Dr. Evelin Anthony for sign off?

## 2022-04-21 NOTE — TELEPHONE ENCOUNTER
Thank you for pending. Referral signed    1. Hiatal hernia  - SURGERY - EXTERNAL    2.  Abdominal pain, unspecified abdominal location  - GASTRO - EXTERNAL

## 2022-04-25 RX ORDER — LISINOPRIL 10 MG/1
10 TABLET ORAL DAILY
Qty: 90 TABLET | Refills: 1 | Status: SHIPPED | OUTPATIENT
Start: 2022-04-25

## 2022-04-27 NOTE — TELEPHONE ENCOUNTER
Hello,    Email was sent to Lavonne, referral specialist, to work referrals ASAP.     Thank you  John Moreno

## 2022-04-28 NOTE — TELEPHONE ENCOUNTER
Per Epic referrals authorized. Patient informed. I provided phone number to set up appointment with Dr. Symone Yip which patient wrote down and will call. She already had the number to Dr. Dejan Farrsi and her daughter has her scheduled to see him on 6/29/2022. I faxed clinicals to Dr. Dejan Farris at [phone] so he has them for her upcoming appointment. I faxed referral to Dr. Symone Yip at fax [phone].

## 2022-04-29 NOTE — TELEPHONE ENCOUNTER
Calling to let office know that Dr Sergo Farley does not do hiatal hernias - they do have Dr Sherrie Barros- the office can change the name on referral to that Dr vasquez pt can be seen there

## 2022-04-29 NOTE — TELEPHONE ENCOUNTER
Dr. Perez Pa    Patient requesting referral to Dr. Libby Allen instead of Dr. Ivory Miller as requested previously since he does not do hiatal hernias.     Thank you

## 2022-04-29 NOTE — TELEPHONE ENCOUNTER
Managed Care    Please assist with PA for new referral to Dr. Ivet Staples since Dr. Celia Rivera does not do hiatal hernias.     Thank you

## 2022-05-02 ENCOUNTER — OFFICE VISIT (OUTPATIENT)
Dept: FAMILY MEDICINE CLINIC | Facility: CLINIC | Age: 77
End: 2022-05-02
Payer: MEDICARE

## 2022-05-02 VITALS
HEART RATE: 63 BPM | TEMPERATURE: 97 F | BODY MASS INDEX: 20.91 KG/M2 | DIASTOLIC BLOOD PRESSURE: 74 MMHG | HEIGHT: 63 IN | WEIGHT: 118 LBS | SYSTOLIC BLOOD PRESSURE: 119 MMHG

## 2022-05-02 DIAGNOSIS — K44.9 HIATAL HERNIA: ICD-10-CM

## 2022-05-02 DIAGNOSIS — E03.9 HYPOTHYROIDISM, UNSPECIFIED TYPE: ICD-10-CM

## 2022-05-02 DIAGNOSIS — I10 ESSENTIAL HYPERTENSION: Primary | ICD-10-CM

## 2022-05-02 DIAGNOSIS — E78.2 MIXED HYPERLIPIDEMIA: ICD-10-CM

## 2022-05-02 PROCEDURE — 99214 OFFICE O/P EST MOD 30 MIN: CPT | Performed by: FAMILY MEDICINE

## 2022-05-02 PROCEDURE — 3074F SYST BP LT 130 MM HG: CPT | Performed by: FAMILY MEDICINE

## 2022-05-02 PROCEDURE — 3008F BODY MASS INDEX DOCD: CPT | Performed by: FAMILY MEDICINE

## 2022-05-02 PROCEDURE — 3078F DIAST BP <80 MM HG: CPT | Performed by: FAMILY MEDICINE

## 2022-05-07 RX ORDER — OMEPRAZOLE 20 MG/1
20 CAPSULE, DELAYED RELEASE ORAL EVERY MORNING
Qty: 90 CAPSULE | Refills: 1 | Status: SHIPPED | OUTPATIENT
Start: 2022-05-07

## 2022-05-07 NOTE — TELEPHONE ENCOUNTER
Refill passed per [place] [employer], [facility] protocol.   Requested Prescriptions   Pending Prescriptions Disp Refills    OMEPRAZOLE 20 MG Oral Capsule Delayed Release [Pharmacy Med Name: Omeprazole Dr 20 Mg Cap Nort] 90 capsule 0     Sig: TAKE ONE CAPSULE BY MOUTH IN THE MORNING        Gastrointestional Medication Protocol Passed - 5/7/2022 10:25 AM        Passed - Appointment in past 12 or next 3 months            Recent Outpatient Visits              5 days ago Essential hypertension    Davion Pimentel MD    Office Visit    3 weeks ago Hiatal hernia    Marchelle Lesch, Thalia De La Cruz MD    Office Visit    1 month ago Epigastric pain    Surgery - [address] [place] [address], Iza Ross MD    Office Visit    3 months ago Right hip pain    Davion Pimentel MD    Office Visit    4 months ago Yas Ny, Davion Mendenhall MD    Telemedicine

## 2022-05-09 NOTE — TELEPHONE ENCOUNTER
Refill passed per [address] [place] protocol.     Requested Prescriptions   Pending Prescriptions Disp Refills    LISINOPRIL 10 MG Oral Tab [Pharmacy Med Name: Lisinopril 10 Mg Tab Solc] 90 tablet 0     Sig: TAKE ONE TABLET BY MOUTH ONE TIME DAILY        Hypertensive Medications Protocol Passed - 4/24/2022 10:36 AM        Passed - CMP or BMP in past 12 months        Passed - Appointment in past 6 or next 3 months        Passed - GFR Non- > 50     Lab Results   Component Value Date    GFRNAA 79 11/22/2021                     Recent Outpatient Visits              1 week ago Hiatal hernia    Brea Delarosa MD    Office Visit    2 weeks ago Epigastric pain    Surgery - Sharita Snowden MD    Office Visit    2 months ago Right hip pain    [address] MD    Office Visit    3 months ago [address], Nadya Beasley MD    Telemedicine    5 months ago Encounter for annual health examination    [address] MD    Office Visit          Future Appointments         Provider Department Appt Notes    In 1 week Tressia Barthel, MD [address], [place] follow up called pt to let her know her lab orders are in. Pt said she will schedule herself at the [facility] in [place]

## 2022-05-10 ENCOUNTER — LAB ENCOUNTER (OUTPATIENT)
Dept: LAB | Age: 77
End: 2022-05-10
Attending: FAMILY MEDICINE
Payer: MEDICARE

## 2022-05-10 DIAGNOSIS — E03.9 HYPOTHYROIDISM, UNSPECIFIED TYPE: ICD-10-CM

## 2022-05-10 LAB
CHOLEST SERPL-MCNC: 181 MG/DL (ref ?–200)
FASTING PATIENT LIPID ANSWER: YES
HDLC SERPL-MCNC: 47 MG/DL (ref 40–59)
LDLC SERPL CALC-MCNC: 101 MG/DL (ref ?–100)
NONHDLC SERPL-MCNC: 134 MG/DL (ref ?–130)
TRIGL SERPL-MCNC: 189 MG/DL (ref 30–149)
TSI SER-ACNC: 1.51 MIU/ML (ref 0.36–3.74)
VLDLC SERPL CALC-MCNC: 32 MG/DL (ref 0–30)

## 2022-05-10 PROCEDURE — 80061 LIPID PANEL: CPT | Performed by: FAMILY MEDICINE

## 2022-05-10 PROCEDURE — 36415 COLL VENOUS BLD VENIPUNCTURE: CPT | Performed by: FAMILY MEDICINE

## 2022-05-10 PROCEDURE — 84443 ASSAY THYROID STIM HORMONE: CPT

## 2022-05-12 RX ORDER — LEVOTHYROXINE SODIUM 0.07 MG/1
75 TABLET ORAL
Qty: 90 TABLET | Refills: 1 | Status: SHIPPED | OUTPATIENT
Start: 2022-05-12

## 2022-05-12 RX ORDER — ROSUVASTATIN CALCIUM 5 MG/1
5 TABLET, COATED ORAL NIGHTLY
Qty: 90 TABLET | Refills: 0 | Status: SHIPPED | OUTPATIENT
Start: 2022-05-12

## 2022-05-12 NOTE — TELEPHONE ENCOUNTER
Refill passed per [facility], [facility] protocol    Requested Prescriptions   Pending Prescriptions Disp Refills    LEVOTHYROXINE 76 MCG Oral Tab [Pharmacy Med Name: Levothyroxine Sodium 75 Mcg Tab Lupi] 90 tablet 0     Sig: TAKE ONE TABLET BY MOUTH BEFORE BREAKFAST        Thyroid Medication Protocol Passed - 5/12/2022  1:32 AM        Passed - TSH in past 12 months        Passed - Last TSH value is normal     Lab Results   Component Value Date    TSH 1.510 05/10/2022                 Passed - Appointment in past 12 or next 3 months           ROSUVASTATIN 5 MG Oral Tab [Pharmacy Med Name: Rosuvastatin Calcium 5 Mg Tab Nort] 90 tablet 0     Sig: take 1 tablet by mouth nightly        Cholesterol Medication Protocol Passed - 5/12/2022  1:32 AM        Passed - ALT in past 12 months        Passed - LDL in past 12 months        Passed - Last ALT < 80       Lab Results   Component Value Date    ALT 23 11/22/2021             Passed - Last LDL < 130     Lab Results   Component Value Date     (H) 05/10/2022               Passed - Appointment in past 12 or next 3 months                  Recent Outpatient Visits              1 week ago Essential hypertension    Debora Clifford MD    Office Visit    1 month ago Hiatal hernia    Roberta Chavez, Mert Starks MD    Office Visit    1 month ago Epigastric pain    Surgery - Marycruz Ross MD    Office Visit    3 months ago Right hip pain    Debora Clifford MD    Office Visit    4 months ago Nieves Adame MD    Telemedicine

## 2022-07-18 NOTE — PROGRESS NOTES
Maximus Robbins is a 70year old female.   HPI:   Patient comes for long history of chest tightness, states about 7 years ago had PFT done and was told she had COPD, she had long history of asthma in childhood that was overall stable, ever since has been using On Levothyroxine 25 mcg    Recheck labs Negative for cough, choking, shortness of breath, wheezing and stridor. Cardiovascular: Negative. Negative for palpitations. Gastrointestinal: Negative. Skin: Negative. Neurological: Negative.            EXAM:     Physical Exam  Vitals signs and chest.  - 2019 NOVEL CORONAVIRUS SARS-COV-2 BY PCR([facility]); Future  - COMPLETE PFT; Future    2. Nodule of left lung    See above  - CT CHEST (W+WO) (CPT=71270); Future  - 2019 NOVEL CORONAVIRUS SARS-COV-2 BY PCR([facility]);  Future     Patient to schedule wellnes

## 2022-07-28 ENCOUNTER — TELEPHONE (OUTPATIENT)
Dept: FAMILY MEDICINE CLINIC | Facility: CLINIC | Age: 77
End: 2022-07-28

## 2022-07-28 DIAGNOSIS — K57.90 DIVERTICULOSIS: Primary | ICD-10-CM

## 2022-07-28 DIAGNOSIS — K64.8 INTERNAL HEMORRHOIDS: ICD-10-CM

## 2022-07-28 NOTE — TELEPHONE ENCOUNTER
Patient is calling to advise to PCPs office that she has been having appointments with Dr. Luis Saxena, and would like to know if you have received results. Please advise.

## 2022-07-28 NOTE — TELEPHONE ENCOUNTER
Spoke, with the patient and informed her of the message below. Pt will call back to get the fax number. Please, provide the fax number when the call gets returned.

## 2022-07-28 NOTE — TELEPHONE ENCOUNTER
I recently reviewed the most recent records sent to me and nothing from her GI yet.   Can fax to [phone]

## 2022-07-29 ENCOUNTER — MED REC SCAN ONLY (OUTPATIENT)
Dept: FAMILY MEDICINE CLINIC | Facility: CLINIC | Age: 77
End: 2022-07-29

## 2022-07-29 PROBLEM — K64.8 INTERNAL HEMORRHOIDS: Status: ACTIVE | Noted: 2022-07-29

## 2022-07-29 PROBLEM — K57.90 DIVERTICULOSIS: Status: ACTIVE | Noted: 2022-07-29

## 2022-08-15 DIAGNOSIS — I70.0 ATHEROSCLEROSIS OF AORTA (HCC): ICD-10-CM

## 2022-08-15 DIAGNOSIS — E78.2 MIXED HYPERLIPIDEMIA: ICD-10-CM

## 2022-08-16 RX ORDER — ROSUVASTATIN CALCIUM 5 MG/1
5 TABLET, COATED ORAL NIGHTLY
Qty: 90 TABLET | Refills: 0 | Status: SHIPPED | OUTPATIENT
Start: 2022-08-16

## 2022-08-25 ENCOUNTER — TELEPHONE (OUTPATIENT)
Dept: CASE MANAGEMENT | Age: 77
End: 2022-08-25

## 2022-08-25 DIAGNOSIS — R10.9 ABDOMINAL PAIN, UNSPECIFIED ABDOMINAL LOCATION: Primary | ICD-10-CM

## 2022-08-25 NOTE — TELEPHONE ENCOUNTER
Dr. Efren oHrner is requesting a referral for follow up visits with Dr. Lorri Zhou. Patient will be seeing the ANA Gan on 9/2/22. Pended referral please review diagnosis and sign off if you agree. Thank you.   Carson Keith

## 2022-08-26 ENCOUNTER — APPOINTMENT (OUTPATIENT)
Dept: CT IMAGING | Facility: HOSPITAL | Age: 77
End: 2022-08-26
Attending: EMERGENCY MEDICINE
Payer: MEDICARE

## 2022-08-26 ENCOUNTER — APPOINTMENT (OUTPATIENT)
Dept: GENERAL RADIOLOGY | Facility: HOSPITAL | Age: 77
End: 2022-08-26
Payer: MEDICARE

## 2022-08-26 ENCOUNTER — HOSPITAL ENCOUNTER (EMERGENCY)
Facility: HOSPITAL | Age: 77
Discharge: HOME | End: 2022-08-26
Payer: MEDICARE

## 2022-08-26 VITALS
RESPIRATION RATE: 18 BRPM | DIASTOLIC BLOOD PRESSURE: 59 MMHG | TEMPERATURE: 98 F | OXYGEN SATURATION: 98 % | BODY MASS INDEX: 21 KG/M2 | WEIGHT: 116 LBS | SYSTOLIC BLOOD PRESSURE: 123 MMHG | HEART RATE: 59 BPM

## 2022-08-26 DIAGNOSIS — J40 BRONCHITIS: ICD-10-CM

## 2022-08-26 DIAGNOSIS — R06.00 DYSPNEA, UNSPECIFIED TYPE: Primary | ICD-10-CM

## 2022-08-26 DIAGNOSIS — J45.901 ASTHMA EXACERBATION, MILD: ICD-10-CM

## 2022-08-26 LAB
ANION GAP SERPL CALC-SCNC: 4 MMOL/L (ref 0–18)
BASOPHILS # BLD AUTO: 0.04 X10(3) UL (ref 0–0.2)
BASOPHILS NFR BLD AUTO: 0.7 %
BILIRUB UR QL: NEGATIVE
BUN BLD-MCNC: 17 MG/DL (ref 7–18)
BUN/CREAT SERPL: 23.3 (ref 10–20)
CALCIUM BLD-MCNC: 9.2 MG/DL (ref 8.5–10.1)
CHLORIDE SERPL-SCNC: 106 MMOL/L (ref 98–112)
CLARITY UR: CLEAR
CO2 SERPL-SCNC: 30 MMOL/L (ref 21–32)
COLOR UR: YELLOW
CREAT BLD-MCNC: 0.73 MG/DL
D DIMER PPP FEU-MCNC: 1.91 UG/ML FEU (ref ?–0.75)
DEPRECATED RDW RBC AUTO: 41.5 FL (ref 35.1–46.3)
EOSINOPHIL # BLD AUTO: 0.23 X10(3) UL (ref 0–0.7)
EOSINOPHIL NFR BLD AUTO: 4 %
ERYTHROCYTE [DISTWIDTH] IN BLOOD BY AUTOMATED COUNT: 11.9 % (ref 11–15)
GFR SERPLBLD BASED ON 1.73 SQ M-ARVRAT: 86 ML/MIN/1.73M2 (ref 60–?)
GLUCOSE BLD-MCNC: 86 MG/DL (ref 70–99)
GLUCOSE UR-MCNC: NEGATIVE MG/DL
HCT VFR BLD AUTO: 39.5 %
HGB BLD-MCNC: 13.1 G/DL
IMM GRANULOCYTES # BLD AUTO: 0.01 X10(3) UL (ref 0–1)
IMM GRANULOCYTES NFR BLD: 0.2 %
LEUKOCYTE ESTERASE UR QL STRIP.AUTO: NEGATIVE
LYMPHOCYTES # BLD AUTO: 1.91 X10(3) UL (ref 1–4)
LYMPHOCYTES NFR BLD AUTO: 33.2 %
MCH RBC QN AUTO: 31.6 PG (ref 26–34)
MCHC RBC AUTO-ENTMCNC: 33.2 G/DL (ref 31–37)
MCV RBC AUTO: 95.2 FL
MONOCYTES # BLD AUTO: 0.81 X10(3) UL (ref 0.1–1)
MONOCYTES NFR BLD AUTO: 14.1 %
NEUTROPHILS # BLD AUTO: 2.76 X10 (3) UL (ref 1.5–7.7)
NEUTROPHILS # BLD AUTO: 2.76 X10(3) UL (ref 1.5–7.7)
NEUTROPHILS NFR BLD AUTO: 47.8 %
NITRITE UR QL STRIP.AUTO: NEGATIVE
NT-PROBNP SERPL-MCNC: 62 PG/ML (ref ?–450)
OSMOLALITY SERPL CALC.SUM OF ELEC: 291 MOSM/KG (ref 275–295)
PH UR: 5.5 [PH] (ref 5–8)
PLATELET # BLD AUTO: 214 10(3)UL (ref 150–450)
POTASSIUM SERPL-SCNC: 3.5 MMOL/L (ref 3.5–5.1)
PROT UR-MCNC: NEGATIVE MG/DL
RBC # BLD AUTO: 4.15 X10(6)UL
SARS-COV-2 RNA RESP QL NAA+PROBE: NOT DETECTED
SODIUM SERPL-SCNC: 140 MMOL/L (ref 136–145)
SP GR UR STRIP: >=1.03 (ref 1–1.03)
TROPONIN I HIGH SENSITIVITY: 5 NG/L
UROBILINOGEN UR STRIP-ACNC: 0.2
WBC # BLD AUTO: 5.8 X10(3) UL (ref 4–11)

## 2022-08-26 PROCEDURE — 85025 COMPLETE CBC W/AUTO DIFF WBC: CPT | Performed by: EMERGENCY MEDICINE

## 2022-08-26 PROCEDURE — 93010 ELECTROCARDIOGRAM REPORT: CPT | Performed by: STUDENT IN AN ORGANIZED HEALTH CARE EDUCATION/TRAINING PROGRAM

## 2022-08-26 PROCEDURE — 36415 COLL VENOUS BLD VENIPUNCTURE: CPT

## 2022-08-26 PROCEDURE — 81015 MICROSCOPIC EXAM OF URINE: CPT | Performed by: EMERGENCY MEDICINE

## 2022-08-26 PROCEDURE — 93005 ELECTROCARDIOGRAM TRACING: CPT

## 2022-08-26 PROCEDURE — 99285 EMERGENCY DEPT VISIT HI MDM: CPT

## 2022-08-26 PROCEDURE — 83880 ASSAY OF NATRIURETIC PEPTIDE: CPT | Performed by: EMERGENCY MEDICINE

## 2022-08-26 PROCEDURE — 80048 BASIC METABOLIC PNL TOTAL CA: CPT | Performed by: EMERGENCY MEDICINE

## 2022-08-26 PROCEDURE — 85379 FIBRIN DEGRADATION QUANT: CPT | Performed by: EMERGENCY MEDICINE

## 2022-08-26 PROCEDURE — 71045 X-RAY EXAM CHEST 1 VIEW: CPT

## 2022-08-26 PROCEDURE — 71260 CT THORAX DX C+: CPT | Performed by: EMERGENCY MEDICINE

## 2022-08-26 PROCEDURE — 81001 URINALYSIS AUTO W/SCOPE: CPT | Performed by: EMERGENCY MEDICINE

## 2022-08-26 PROCEDURE — 84484 ASSAY OF TROPONIN QUANT: CPT | Performed by: EMERGENCY MEDICINE

## 2022-08-26 RX ORDER — PREDNISONE 20 MG/1
40 TABLET ORAL DAILY
Qty: 8 TABLET | Refills: 0 | Status: SHIPPED | OUTPATIENT
Start: 2022-08-26 | End: 2022-08-30

## 2022-08-26 RX ORDER — AZITHROMYCIN 250 MG/1
TABLET, FILM COATED ORAL
Qty: 6 TABLET | Refills: 0 | Status: SHIPPED | OUTPATIENT
Start: 2022-08-26 | End: 2022-08-31

## 2022-08-26 RX ORDER — POTASSIUM CHLORIDE 20 MEQ/1
40 TABLET, EXTENDED RELEASE ORAL ONCE
Status: COMPLETED | OUTPATIENT
Start: 2022-08-26 | End: 2022-08-26

## 2022-08-26 NOTE — ED QUICK NOTES
Pt presents to ED with c/o chest pressure last night and has since resolved. Pt sts brief SOB. Pt denies pain last night and current. Pt also sts she has tingling in her arms and legs x 1 day. Pt is in bed, bed in the lowest position, call light within reach and daughter at bedside.

## 2022-08-26 NOTE — ED INITIAL ASSESSMENT (HPI)
Patient presents with:  Difficulty Breathing: Aox4. Arrives ambulatory. Complaints of SOB xs last night with chest pressure. Afebrile. spo2 96%. Also complaining of parasthesias to all digits; hx hypokalemia. No chest pain, states pressure.

## 2022-10-15 DIAGNOSIS — I10 ESSENTIAL HYPERTENSION: ICD-10-CM

## 2022-10-17 RX ORDER — LISINOPRIL 10 MG/1
10 TABLET ORAL DAILY
Qty: 90 TABLET | Refills: 1 | Status: SHIPPED | OUTPATIENT
Start: 2022-10-17

## 2022-10-17 RX ORDER — CHOLECALCIFEROL (VITAMIN D3) 50 MCG
TABLET ORAL
Qty: 90 TABLET | Refills: 0 | Status: SHIPPED | OUTPATIENT
Start: 2022-10-17

## 2022-10-17 NOTE — TELEPHONE ENCOUNTER
Please review. Protocol failed/ No protocol      Requested Prescriptions   Pending Prescriptions Disp Refills    Cholecalciferol (VITAMIN D) 50 MCG (2000 UT) Oral Tab [Pharmacy Med Name: Vitamin D 50 Mcg Tab Rugb] 90 tablet 0     Sig: TAKE ONE TABLET BY MOUTH ONE TIME DAILY        There is no refill protocol information for this order       Signed Prescriptions Disp Refills    lisinopril 10 MG Oral Tab 90 tablet 1     Sig: Take 1 tablet (10 mg total) by mouth daily.         Hypertensive Medications Protocol Passed - 10/15/2022  9:43 AM        Passed - In person appointment in the past 12 or next 3 months       Recent Outpatient Visits              5 months ago Essential hypertension    [address], Marcela Arredondo MD    Office Visit    6 months ago Hiatal hernia    [address], [address], [facility] MD Ary    Office Visit    6 months ago Epigastric pain    Surgery - [address] [place] [address], [place] Berenice Cuba MD    Office Visit    8 months ago Right hip pain    [address] [place] Marcela Walton MD    Office Visit    9 months ago Marcela Gastelum MD    Telemedicine     Future Appointments         Provider Department Appt Notes    In 1 month Adelina Arroyo MD [address], [place] Last THE [facility] 28/0/66 Policy informed               Passed - Last BP reading less than 140/90     BP Readings from Last 1 Encounters:  08/26/22 : 123/59                Passed - CMP or BMP in past 6 months     Recent Results (from the past 4392 hour(s))   Basic Metabolic Panel (8)    Collection Time: 08/26/22  4:19 PM   Result Value Ref Range    Glucose 86 70 - 99 mg/dL    Sodium 140 136 - 145 mmol/L    Potassium 3.5 3.5 - 5.1 mmol/L    Chloride 106 98 - 112 mmol/L    CO2 30.0 21.0 - 32.0 mmol/L    Anion Gap 4 0 - 18 mmol/L    BUN 17 7 - 18 mg/dL    Creatinine 0.73 0.55 - 1.02 mg/dL    BUN/CREA Ratio 23.3 (H) 10.0 - 20.0 Calcium, Total 9.2 8.5 - 10.1 mg/dL    Calculated Osmolality 291 275 - 295 mOsm/kg    eGFR-Cr 86 >=60 mL/min/1.73m2     *Note: Due to a large number of results and/or encounters for the requested time period, some results have not been displayed. A complete set of results can be found in Results Review.                  Passed - In person appointment or virtual visit in the past 6 months       Recent Outpatient Visits              5 months ago Essential hypertension    [address] [place] [address] [place], Clarice Snellen, MD    Office Visit    6 months ago Hiatal hernia    [address] [place] [address], [address], Young March MD    Office Visit    6 months ago Epigastric pain    Surgery - [address], Joshua Gomez MD    Office Visit    8 months ago Right hip pain    [address], Clarice Snellen, MD    Office Visit    9 months ago [address] [facility] [place], Clarice Snellen, MD    Telemedicine     Future Appointments         Provider Department Appt Notes    In 1 month Clari Keene MD [address] [place] [address] 44/4/34 Policy informed               Passed - EGFRCR or GFRNAA > 50     GFR Evaluation  EGFRCR: 86 , resulted on 8/26/2022                  Future Appointments         Provider Department Appt Notes    In 1 month Clari Kenee MD [address] [place] [address] 63/9/54 Policy informed             Recent Outpatient Visits              5 months ago Essential hypertension    [address], Clarice Snellen, MD    Office Visit    6 months ago Hiatal hernia    Malinda Johnson MD    Office Visit    6 months ago Epigastric pain    Surgery - Ethan Zafar MD    Office Visit    8 months ago Right hip pain    [address], Clarice Snellen, MD    Office Visit    9 months ago [address] Stu Tamayo MD    Telemedicine

## 2022-10-17 NOTE — TELEPHONE ENCOUNTER
Refill passed per [employer] protocol.       Requested Prescriptions   Pending Prescriptions Disp Refills    VITAMIN D 50 MCG (2000 UT) Oral Tab [Pharmacy Med Name: Vitamin D 50 Mcg Tab Rugb] 90 tablet 0     Sig: TAKE ONE TABLET BY MOUTH ONE TIME DAILY        There is no refill protocol information for this order        LISINOPRIL 10 MG Oral Tab [Pharmacy Med Name: Lisinopril 10 Mg Tab Solc] 90 tablet 0     Sig: TAKE ONE TABLET BY MOUTH ONE TIME DAILY        Hypertensive Medications Protocol Passed - 10/15/2022  9:43 AM        Passed - In person appointment in the past 12 or next 3 months       Recent Outpatient Visits              5 months ago Essential hypertension    [facility], [address], Radha Espana MD    Office Visit    6 months ago Hiatal hernia    [employer] [place], [facility], [address], Lyle March MD    Office Visit    6 months ago Epigastric pain    Surgery - [address], [place] Dacia Gómez MD    Office Visit    8 months ago Right hip pain    [address] [facility], Radha Espana MD    Office Visit    9 months ago [address] [facility], [address], Radha Espana, [facility]    Telemedicine     Future Appointments         Provider Department Appt Notes    In 1 month Ap Alamo MD [facility], [facility], [address], [facility] 80/7/71 Policy informed               Passed - Last BP reading less than 140/90     BP Readings from Last 1 Encounters:  08/26/22 : 123/59                Passed - CMP or BMP in past 6 months     Recent Results (from the past 4392 hour(s))   Basic Metabolic Panel (8)    Collection Time: 08/26/22  4:19 PM   Result Value Ref Range    Glucose 86 70 - 99 mg/dL    Sodium 140 136 - 145 mmol/L    Potassium 3.5 3.5 - 5.1 mmol/L    Chloride 106 98 - 112 mmol/L    CO2 30.0 21.0 - 32.0 mmol/L    Anion Gap 4 0 - 18 mmol/L    BUN 17 7 - 18 mg/dL    Creatinine 0.73 0.55 - 1.02 mg/dL    BUN/CREA Ratio 23.3 (H) 10.0 - 20.0    Calcium, Total 9.2 8.5 - 10.1 mg/dL    Calculated Osmolality 291 275 - 295 mOsm/kg    eGFR-Cr 86 >=60 mL/min/1.73m2     *Note: Due to a large number of results and/or encounters for the requested time period, some results have not been displayed. A complete set of results can be found in Results Review.                  Passed - In person appointment or virtual visit in the past 6 months       Recent Outpatient Visits              5 months ago Essential hypertension    [address], Suzy Siddiqui MD    Office Visit    6 months ago Hiatal hernia    [address] [place] [address], [address], [place], Yovany Cordero MD    Office Visit    6 months ago Epigastric pain    Surgery - [address] [place] [address], Cherrei House MD    Office Visit    8 months ago Right hip pain    Suzy Sarah MD    Office Visit    9 months ago [address] [place] [address], Suzy Siddiqui MD    Telemedicine     Future Appointments         Provider Department Appt Notes    In 1 month Mynor Norris MD [address] [place] [address], [facility] 40/3/84 Policy informed               Passed - EGFRCR or GFRNAA > 50     GFR Evaluation  EGFRCR: 86 , resulted on 8/26/2022                   Future Appointments         Provider Department Appt Notes    In 1 month Mynor Norris MD [address] [place] [address], [facility] 60/0/81 Policy informed            Recent Outpatient Visits              5 months ago Essential hypertension    Suzy Sarah MD    Office Visit    6 months ago Hiatal hernia    [address] [place], Aury Francis MD    Office Visit    6 months ago Epigastric pain    Surgery - Boogie Motta MD    Office Visit    8 months ago Right hip pain    Suzy Sarah MD    Office Visit    9 months ago [address] [place] [address], Swati Maki MD    Telemedicine

## 2022-11-13 RX ORDER — LEVOTHYROXINE SODIUM 0.07 MG/1
75 TABLET ORAL
Qty: 90 TABLET | Refills: 1 | Status: SHIPPED | OUTPATIENT
Start: 2022-11-13

## 2022-11-13 NOTE — TELEPHONE ENCOUNTER
Refill passed per [employer] [facility] protocol.     Requested Prescriptions   Pending Prescriptions Disp Refills    LEVOTHYROXINE 75 MCG Oral Tab [Pharmacy Med Name: Levothyroxine Sodium 75 Mcg Tab Lupi] 90 tablet 0     Sig: Take 1 tablet (75 mcg total) by mouth before breakfast.       Thyroid Medication Protocol Passed - 11/13/2022  1:31 AM        Passed - TSH in past 12 months        Passed - Last TSH value is normal     Lab Results   Component Value Date    TSH 1.510 05/10/2022                 Passed - In person appointment or virtual visit in the past 12 mos or appointment in next 3 mos     Recent Outpatient Visits              6 months ago Essential hypertension    [place] [facility], [address], Terri Hayes MD    Office Visit    7 months ago Hiatal hernia    [place] [facility], [address], Brianne March MD    Office Visit    7 months ago Epigastric pain    Surgery - [address] [place], Apple Gilbert MD    Office Visit    9 months ago Right hip pain    [address] Terri Bishop MD    Office Visit    10 months ago [address] [place] [address], Terri Hayes MD    Telemedicine          Future Appointments         Provider Department Appt Notes    In 1 week Livier Buitrago MD [place] [employer] [facility], [address], [place] THE [facility] 61/5/55 Policy informed                  Recent Outpatient Visits              6 months ago Essential hypertension    [address] Terri Bishop MD    Office Visit    7 months ago Hiatal hernia    Kaity Tang MD    Office Visit    7 months ago Epigastric pain    Surgery - [address], Apple Gilbert MD    Office Visit    9 months ago Right hip pain    [address] Terri Bishop MD    Office Visit    10 months ago [address], [address], Terri Hayes MD    Telemedicine          Future Appointments         Provider Department Appt Notes    In 1 week Mynor Norris MD [place] [facility], [address], [place] Last THE [facility] 73/4/43 Policy informed

## 2022-11-13 NOTE — TELEPHONE ENCOUNTER
Refill passed per [employer] [facility] protocol.     Requested Prescriptions   Pending Prescriptions Disp Refills    LEVOTHYROXINE 75 MCG Oral Tab [Pharmacy Med Name: Levothyroxine Sodium 75 Mcg Tab Lupi] 90 tablet 0     Sig: Take 1 tablet (75 mcg total) by mouth before breakfast.       Thyroid Medication Protocol Passed - 11/13/2022  1:31 AM        Passed - TSH in past 12 months        Passed - Last TSH value is normal     Lab Results   Component Value Date    TSH 1.510 05/10/2022                 Passed - In person appointment or virtual visit in the past 12 mos or appointment in next 3 mos     Recent Outpatient Visits              6 months ago Essential hypertension    [facility], [address], Alia Mata MD    Office Visit    7 months ago Hiatal hernia    [place] [facility], [address], Raymundo March MD    Office Visit    7 months ago Epigastric pain    Surgery - [address] [place] MD    Office Visit    9 months ago Right hip pain    [address] Alia Bishop MD    Office Visit    10 months ago [address] [place] [address] Chari Skene, MD    Telemedicine          Future Appointments         Provider Department Appt Notes    In 1 week Tracy Moore MD [facility], [address], [place] THE [facility] 12/5/91 Policy informed                  Recent Outpatient Visits              6 months ago Essential hypertension    [address] Al[address] MD    Office Visit    7 months ago Hiatal hernia    Ken[facility] Looney, Palma Gil MD    Office Visit    7 months ago Epigastric pain    Surgery - [address] [place], Pau Montes MD    Office Visit    9 months ago Right hip pain    [address] MD    Office Visit    10 months ago [address] Chari Skene, MD    Telemedicine          Future Appointments         Provider Department Appt Notes    In 1 week Danny Barrera MD [address], [facility] [address], [place] Last THE [facility] 51/6/98 Policy informed

## 2022-11-16 ENCOUNTER — PATIENT MESSAGE (OUTPATIENT)
Dept: FAMILY MEDICINE CLINIC | Facility: CLINIC | Age: 77
End: 2022-11-16

## 2022-11-16 ENCOUNTER — TELEPHONE (OUTPATIENT)
Dept: CASE MANAGEMENT | Age: 77
End: 2022-11-16

## 2022-11-16 DIAGNOSIS — K44.9 HIATAL HERNIA: Primary | ICD-10-CM

## 2022-11-16 NOTE — TELEPHONE ENCOUNTER
Dr. Mannie Riedel,    Patient has appointment with Dr. Edson Bass for a consult for surgery for a hiatial hernia, appointment is 11/22/22. Pended referral please review diagnosis and sign off if you agree. Thank you.   Carson Keith

## 2022-11-17 NOTE — TELEPHONE ENCOUNTER
Please sign off if you agree with plan of care.      Thank you,  [facility]  Referral specialist

## 2022-11-17 NOTE — TELEPHONE ENCOUNTER
See other encounter from today. [facility] has sent referral request to PCP for review.

## 2022-11-26 ENCOUNTER — LAB ENCOUNTER (OUTPATIENT)
Dept: LAB | Age: 77
End: 2022-11-26
Attending: FAMILY MEDICINE
Payer: MEDICARE

## 2022-11-26 ENCOUNTER — OFFICE VISIT (OUTPATIENT)
Dept: FAMILY MEDICINE CLINIC | Facility: CLINIC | Age: 77
End: 2022-11-26
Payer: MEDICARE

## 2022-11-26 VITALS
WEIGHT: 122 LBS | SYSTOLIC BLOOD PRESSURE: 111 MMHG | HEART RATE: 71 BPM | BODY MASS INDEX: 22 KG/M2 | TEMPERATURE: 97 F | DIASTOLIC BLOOD PRESSURE: 67 MMHG

## 2022-11-26 DIAGNOSIS — K42.9 UMBILICAL HERNIA WITHOUT OBSTRUCTION AND WITHOUT GANGRENE: ICD-10-CM

## 2022-11-26 DIAGNOSIS — K44.9 PARAESOPHAGEAL HERNIA: ICD-10-CM

## 2022-11-26 DIAGNOSIS — R10.84 ABDOMINAL PAIN, GENERALIZED: ICD-10-CM

## 2022-11-26 DIAGNOSIS — J44.9 ASTHMA WITH COPD (CHRONIC OBSTRUCTIVE PULMONARY DISEASE) (HCC): ICD-10-CM

## 2022-11-26 DIAGNOSIS — R14.3 FLATULENCE, ERUCTATION, AND GAS PAIN: ICD-10-CM

## 2022-11-26 DIAGNOSIS — R14.2 FLATULENCE, ERUCTATION, AND GAS PAIN: ICD-10-CM

## 2022-11-26 DIAGNOSIS — R91.1 NODULE OF LEFT LUNG: ICD-10-CM

## 2022-11-26 DIAGNOSIS — I10 ESSENTIAL HYPERTENSION: ICD-10-CM

## 2022-11-26 DIAGNOSIS — I70.0 ATHEROSCLEROSIS OF AORTA (HCC): ICD-10-CM

## 2022-11-26 DIAGNOSIS — E87.6 HYPOKALEMIA: ICD-10-CM

## 2022-11-26 DIAGNOSIS — L23.1 ALLERGIC CONTACT DERMATITIS DUE TO ADHESIVES: ICD-10-CM

## 2022-11-26 DIAGNOSIS — Z00.00 ENCOUNTER FOR ANNUAL HEALTH EXAMINATION: Primary | ICD-10-CM

## 2022-11-26 DIAGNOSIS — K57.90 DIVERTICULOSIS: ICD-10-CM

## 2022-11-26 DIAGNOSIS — E78.2 MIXED HYPERLIPIDEMIA: ICD-10-CM

## 2022-11-26 DIAGNOSIS — M85.80 OSTEOPENIA, UNSPECIFIED LOCATION: ICD-10-CM

## 2022-11-26 DIAGNOSIS — I73.9 PAD (PERIPHERAL ARTERY DISEASE) (HCC): ICD-10-CM

## 2022-11-26 DIAGNOSIS — K44.9 HIATAL HERNIA: ICD-10-CM

## 2022-11-26 DIAGNOSIS — Z23 NEEDS FLU SHOT: ICD-10-CM

## 2022-11-26 DIAGNOSIS — E03.9 HYPOTHYROIDISM, UNSPECIFIED TYPE: ICD-10-CM

## 2022-11-26 DIAGNOSIS — K64.8 INTERNAL HEMORRHOIDS: ICD-10-CM

## 2022-11-26 DIAGNOSIS — R14.1 FLATULENCE, ERUCTATION, AND GAS PAIN: ICD-10-CM

## 2022-11-26 DIAGNOSIS — K22.2 SCHATZKI'S RING: ICD-10-CM

## 2022-11-26 DIAGNOSIS — K29.50 CHRONIC GASTRITIS WITHOUT BLEEDING, UNSPECIFIED GASTRITIS TYPE: ICD-10-CM

## 2022-11-26 DIAGNOSIS — E46 PROTEIN-CALORIE MALNUTRITION, UNSPECIFIED SEVERITY (HCC): ICD-10-CM

## 2022-11-26 DIAGNOSIS — K21.9 GASTROESOPHAGEAL REFLUX DISEASE, UNSPECIFIED WHETHER ESOPHAGITIS PRESENT: ICD-10-CM

## 2022-11-26 LAB
ALBUMIN SERPL-MCNC: 3.6 G/DL (ref 3.4–5)
ALBUMIN/GLOB SERPL: 1.2 {RATIO} (ref 1–2)
ALP LIVER SERPL-CCNC: 74 U/L
ALT SERPL-CCNC: 17 U/L
ANION GAP SERPL CALC-SCNC: 4 MMOL/L (ref 0–18)
AST SERPL-CCNC: 16 U/L (ref 15–37)
BASOPHILS # BLD AUTO: 0.05 X10(3) UL (ref 0–0.2)
BASOPHILS NFR BLD AUTO: 0.9 %
BILIRUB SERPL-MCNC: 0.3 MG/DL (ref 0.1–2)
BUN BLD-MCNC: 12 MG/DL (ref 7–18)
BUN/CREAT SERPL: 18.5 (ref 10–20)
CALCIUM BLD-MCNC: 9.6 MG/DL (ref 8.5–10.1)
CHLORIDE SERPL-SCNC: 107 MMOL/L (ref 98–112)
CHOLEST SERPL-MCNC: 167 MG/DL (ref ?–200)
CO2 SERPL-SCNC: 31 MMOL/L (ref 21–32)
CREAT BLD-MCNC: 0.65 MG/DL
DEPRECATED RDW RBC AUTO: 41.2 FL (ref 35.1–46.3)
EOSINOPHIL # BLD AUTO: 0.28 X10(3) UL (ref 0–0.7)
EOSINOPHIL NFR BLD AUTO: 5 %
ERYTHROCYTE [DISTWIDTH] IN BLOOD BY AUTOMATED COUNT: 11.5 % (ref 11–15)
EST. AVERAGE GLUCOSE BLD GHB EST-MCNC: 117 MG/DL (ref 68–126)
FASTING PATIENT LIPID ANSWER: NO
FASTING STATUS PATIENT QL REPORTED: NO
GFR SERPLBLD BASED ON 1.73 SQ M-ARVRAT: 92 ML/MIN/1.73M2 (ref 60–?)
GLOBULIN PLAS-MCNC: 3 G/DL (ref 2.8–4.4)
GLUCOSE BLD-MCNC: 68 MG/DL (ref 70–99)
HBA1C MFR BLD: 5.7 % (ref ?–5.7)
HCT VFR BLD AUTO: 39.2 %
HDLC SERPL-MCNC: 47 MG/DL (ref 40–59)
HGB BLD-MCNC: 12.9 G/DL
IMM GRANULOCYTES # BLD AUTO: 0.01 X10(3) UL (ref 0–1)
IMM GRANULOCYTES NFR BLD: 0.2 %
LDLC SERPL CALC-MCNC: 90 MG/DL (ref ?–100)
LYMPHOCYTES # BLD AUTO: 1.9 X10(3) UL (ref 1–4)
LYMPHOCYTES NFR BLD AUTO: 34.1 %
MCH RBC QN AUTO: 31.7 PG (ref 26–34)
MCHC RBC AUTO-ENTMCNC: 32.9 G/DL (ref 31–37)
MCV RBC AUTO: 96.3 FL
MONOCYTES # BLD AUTO: 0.71 X10(3) UL (ref 0.1–1)
MONOCYTES NFR BLD AUTO: 12.7 %
NEUTROPHILS # BLD AUTO: 2.62 X10 (3) UL (ref 1.5–7.7)
NEUTROPHILS # BLD AUTO: 2.62 X10(3) UL (ref 1.5–7.7)
NEUTROPHILS NFR BLD AUTO: 47.1 %
NONHDLC SERPL-MCNC: 120 MG/DL (ref ?–130)
OSMOLALITY SERPL CALC.SUM OF ELEC: 292 MOSM/KG (ref 275–295)
PLATELET # BLD AUTO: 223 10(3)UL (ref 150–450)
POTASSIUM SERPL-SCNC: 4.3 MMOL/L (ref 3.5–5.1)
PROT SERPL-MCNC: 6.6 G/DL (ref 6.4–8.2)
RBC # BLD AUTO: 4.07 X10(6)UL
SODIUM SERPL-SCNC: 142 MMOL/L (ref 136–145)
TRIGL SERPL-MCNC: 175 MG/DL (ref 30–149)
TSI SER-ACNC: 1.39 MIU/ML (ref 0.36–3.74)
VLDLC SERPL CALC-MCNC: 28 MG/DL (ref 0–30)
WBC # BLD AUTO: 5.6 X10(3) UL (ref 4–11)

## 2022-11-26 PROCEDURE — 85025 COMPLETE CBC W/AUTO DIFF WBC: CPT | Performed by: FAMILY MEDICINE

## 2022-11-26 PROCEDURE — 80061 LIPID PANEL: CPT | Performed by: FAMILY MEDICINE

## 2022-11-26 PROCEDURE — 80053 COMPREHEN METABOLIC PANEL: CPT | Performed by: FAMILY MEDICINE

## 2022-11-26 PROCEDURE — 36415 COLL VENOUS BLD VENIPUNCTURE: CPT | Performed by: FAMILY MEDICINE

## 2022-11-26 PROCEDURE — 84443 ASSAY THYROID STIM HORMONE: CPT | Performed by: FAMILY MEDICINE

## 2022-11-26 PROCEDURE — 83036 HEMOGLOBIN GLYCOSYLATED A1C: CPT | Performed by: FAMILY MEDICINE

## 2023-01-12 RX ORDER — CHOLECALCIFEROL (VITAMIN D3) 125 MCG
CAPSULE ORAL
Qty: 90 TABLET | Refills: 0 | Status: SHIPPED | OUTPATIENT
Start: 2023-01-12

## 2023-02-23 ENCOUNTER — TELEPHONE (OUTPATIENT)
Dept: CASE MANAGEMENT | Age: 78
End: 2023-02-23

## 2023-02-23 DIAGNOSIS — K44.9 HIATAL HERNIA: Primary | ICD-10-CM

## 2023-02-23 NOTE — TELEPHONE ENCOUNTER
Dr. Thao Acosta is requesting a follow up referral for   Dr. Jonathan Obregon for a hernia. Pended referral please review diagnosis and sign off if you agree. Thank you.   Carson Keith

## 2023-04-07 DIAGNOSIS — I10 ESSENTIAL HYPERTENSION: ICD-10-CM

## 2023-04-07 RX ORDER — LISINOPRIL 10 MG/1
10 TABLET ORAL DAILY
Qty: 90 TABLET | Refills: 3 | Status: SHIPPED | OUTPATIENT
Start: 2023-04-07

## 2023-04-07 NOTE — TELEPHONE ENCOUNTER
Refill passed per [employer] protocol.     Requested Prescriptions   Pending Prescriptions Disp Refills    LISINOPRIL 10 MG Oral Tab [Pharmacy Med Name: Lisinopril 10 Mg Tab Lupi] 90 tablet 0     Sig: TAKE ONE TABLET BY MOUTH ONE TIME DAILY       Hypertensive Medications Protocol Passed - 4/7/2023  1:32 AM        Passed - In person appointment in the past 12 or next 3 months     Recent Outpatient Visits              4 months ago Encounter for annual health examination    [place] Nataliia Gallagher MD    Office Visit    11 months ago Essential hypertension    [facility] Nataliia Gallagher MD    Office Visit    12 months ago Hiatal hernia    [facility] Silviano Gallagher MD    Office Visit    1 year ago Epigastric pain    Surgery - [address] [place] [address], [place] Beatriz Dsouza MD    Office Visit    1 year ago Right hip pain    [facility] Aide Gallagher Craig Gill, MD    Office Visit                      Passed - Last BP reading less than 140/90     BP Readings from Last 1 Encounters:  11/26/22 : 111/67              Passed - CMP or BMP in past 6 months     Recent Results (from the past 4392 hour(s))   COMP METABOLIC PANEL (14)    Collection Time: 11/26/22 10:27 AM   Result Value Ref Range    Glucose 68 (L) 70 - 99 mg/dL    Sodium 142 136 - 145 mmol/L    Potassium 4.3 3.5 - 5.1 mmol/L    Chloride 107 98 - 112 mmol/L    CO2 31.0 21.0 - 32.0 mmol/L    Anion Gap 4 0 - 18 mmol/L    BUN 12 7 - 18 mg/dL    Creatinine 0.65 0.55 - 1.02 mg/dL    BUN/CREA Ratio 18.5 10.0 - 20.0    Calcium, Total 9.6 8.5 - 10.1 mg/dL    Calculated Osmolality 292 275 - 295 mOsm/kg    eGFR-Cr 92 >=60 mL/min/1.73m2    ALT 17 13 - 56 U/L    AST 16 15 - 37 U/L    Alkaline Phosphatase 74 55 - 142 U/L    Bilirubin, Total 0.3 0.1 - 2.0 mg/dL    Total Protein 6.6 6.4 - 8.2 g/dL    Albumin 3.6 3.4 - 5.0 g/dL Globulin  3.0 2.8 - 4.4 g/dL    A/G Ratio 1.2 1.0 - 2.0    Patient Fasting for CMP? No      *Note: Due to a large number of results and/or encounters for the requested time period, some results have not been displayed. A complete set of results can be found in Results Review.                Passed - In person appointment or virtual visit in the past 6 months     Recent Outpatient Visits              4 months ago Encounter for annual health examination    Nadya Degroot MD    Office Visit    11 months ago Essential hypertension    Nadya Degroot MD    Office Visit    12 months ago Hiatal hernia    Brea Degroot MD    Office Visit    1 year ago Epigastric pain    Surgery - [address] [place] [address], Abdulaziz Bhardwaj MD    Office Visit    1 year ago Right hip pain    Nadya Degroot MD    Office Visit                      Passed - EGFRCR or GFRNAA > 50     GFR Evaluation  EGFRCR: 92 , resulted on 11/26/2022             Recent Outpatient Visits              4 months ago Encounter for annual health examination    Nadya Degroot MD    Office Visit    11 months ago Essential hypertension    Nadya Degroot MD    Office Visit    12 months ago Hiatal hernia    [facility] Brea Ames MD    Office Visit    1 year ago Epigastric pain    Surgery - Sharita Snowden MD    Office Visit    1 year ago Right hip pain    [facility] Nadya Ames MD    Office Visit

## 2023-04-28 ENCOUNTER — MED REC SCAN ONLY (OUTPATIENT)
Dept: FAMILY MEDICINE CLINIC | Facility: CLINIC | Age: 78
End: 2023-04-28

## 2023-05-01 NOTE — TELEPHONE ENCOUNTER
Routed to [facility] for Dr Jemal Reeves for advise, thanks.       Please review refill protocol failed/ no protocol  Requested Prescriptions   Pending Prescriptions Disp Refills    CHOLECALCIFEROL 50 MCG (2000 UT) Oral Tab [Pharmacy Med Name: D3 2,000 Unit Tab Phar] 90 tablet 0     Sig: TAKE 1 TABLET BY MOUTH 1 TIME DAILY       There is no refill protocol information for this order

## 2023-05-02 RX ORDER — CHOLECALCIFEROL (VITAMIN D3) 125 MCG
CAPSULE ORAL
Qty: 90 TABLET | Refills: 0 | Status: SHIPPED | OUTPATIENT
Start: 2023-05-02

## 2023-05-18 ENCOUNTER — OFFICE VISIT (OUTPATIENT)
Dept: FAMILY MEDICINE CLINIC | Facility: CLINIC | Age: 78
End: 2023-05-18

## 2023-05-18 VITALS
BODY MASS INDEX: 19.49 KG/M2 | DIASTOLIC BLOOD PRESSURE: 68 MMHG | WEIGHT: 110 LBS | HEIGHT: 63 IN | SYSTOLIC BLOOD PRESSURE: 126 MMHG | HEART RATE: 65 BPM

## 2023-05-18 DIAGNOSIS — Z12.11 SCREENING FOR MALIGNANT NEOPLASM OF COLON: ICD-10-CM

## 2023-05-18 DIAGNOSIS — Z12.31 ENCOUNTER FOR SCREENING MAMMOGRAM FOR BREAST CANCER: ICD-10-CM

## 2023-05-18 DIAGNOSIS — M25.512 ACUTE PAIN OF LEFT SHOULDER: Primary | ICD-10-CM

## 2023-05-18 PROCEDURE — 99213 OFFICE O/P EST LOW 20 MIN: CPT | Performed by: PHYSICIAN ASSISTANT

## 2023-05-18 PROCEDURE — 1159F MED LIST DOCD IN RCRD: CPT | Performed by: PHYSICIAN ASSISTANT

## 2023-05-18 PROCEDURE — 1126F AMNT PAIN NOTED NONE PRSNT: CPT | Performed by: PHYSICIAN ASSISTANT

## 2023-05-18 PROCEDURE — 3078F DIAST BP <80 MM HG: CPT | Performed by: PHYSICIAN ASSISTANT

## 2023-05-18 PROCEDURE — 3008F BODY MASS INDEX DOCD: CPT | Performed by: PHYSICIAN ASSISTANT

## 2023-05-18 PROCEDURE — 3074F SYST BP LT 130 MM HG: CPT | Performed by: PHYSICIAN ASSISTANT

## 2023-05-18 RX ORDER — SIMETHICONE 125 MG
1 CAPSULE ORAL
COMMUNITY

## 2023-07-06 ENCOUNTER — OFFICE VISIT (OUTPATIENT)
Dept: FAMILY MEDICINE CLINIC | Facility: CLINIC | Age: 78
End: 2023-07-06

## 2023-07-06 VITALS
BODY MASS INDEX: 19.67 KG/M2 | HEIGHT: 63 IN | WEIGHT: 111 LBS | DIASTOLIC BLOOD PRESSURE: 65 MMHG | HEART RATE: 60 BPM | SYSTOLIC BLOOD PRESSURE: 103 MMHG

## 2023-07-06 DIAGNOSIS — R21 RASH: Primary | ICD-10-CM

## 2023-07-06 PROCEDURE — 1159F MED LIST DOCD IN RCRD: CPT | Performed by: PHYSICIAN ASSISTANT

## 2023-07-06 PROCEDURE — 1126F AMNT PAIN NOTED NONE PRSNT: CPT | Performed by: PHYSICIAN ASSISTANT

## 2023-07-06 PROCEDURE — 3074F SYST BP LT 130 MM HG: CPT | Performed by: PHYSICIAN ASSISTANT

## 2023-07-06 PROCEDURE — 99212 OFFICE O/P EST SF 10 MIN: CPT | Performed by: PHYSICIAN ASSISTANT

## 2023-07-06 PROCEDURE — 3078F DIAST BP <80 MM HG: CPT | Performed by: PHYSICIAN ASSISTANT

## 2023-07-06 PROCEDURE — 3008F BODY MASS INDEX DOCD: CPT | Performed by: PHYSICIAN ASSISTANT

## 2023-07-20 ENCOUNTER — HOSPITAL ENCOUNTER (OUTPATIENT)
Dept: MAMMOGRAPHY | Age: 78
Discharge: HOME | End: 2023-07-20
Attending: PHYSICIAN ASSISTANT
Payer: MEDICARE

## 2023-07-20 DIAGNOSIS — Z12.31 ENCOUNTER FOR SCREENING MAMMOGRAM FOR BREAST CANCER: ICD-10-CM

## 2023-07-20 PROCEDURE — 77063 BREAST TOMOSYNTHESIS BI: CPT | Performed by: PHYSICIAN ASSISTANT

## 2023-07-20 PROCEDURE — 77067 SCR MAMMO BI INCL CAD: CPT | Performed by: PHYSICIAN ASSISTANT

## 2023-07-23 NOTE — TELEPHONE ENCOUNTER
Daily vitamin D, no protocol. Please advise on refill request.    Requested Prescriptions     Pending Prescriptions Disp Refills    CHOLECALCIFEROL 50 MCG (2000 UT) Oral Tab [Pharmacy Med Name: Vitamin D3 2,000 Unit Tab Romina] 90 tablet 0     Sig: TAKE 1 TABLET BY MOUTH 1 TIME DAILY      Recent Visits  Date Type Provider Dept   07/06/23 Office Visit Kelly Dias PA-C [facility]   05/18/23 Office Visit Kelly Dias PA-C [facility]   11/26/22 Office Visit Dolly De Souza MD [facility]   05/02/22 Office Visit MD [facility]   02/04/22 Office Visit Dolly De Souza MD [facility]   Showing recent visits within past 540 days with a meds authorizing provider and meeting all other requirements  Future Appointments  No visits were found meeting these conditions.   Showing future appointments within next 150 days with a meds authorizing provider and meeting all other requirements     Requested Prescriptions   Pending Prescriptions Disp Refills    CHOLECALCIFEROL 50 MCG (2000 UT) Oral Tab [Pharmacy Med Name: Vitamin D3 2,000 Unit Tab Romina] 90 tablet 0     Sig: TAKE 1 TABLET BY MOUTH 1 TIME DAILY       There is no refill protocol information for this order         Future Appointments         Provider Department Appt Notes    In 1 week [address] [facility]            Recent Outpatient Visits              2 weeks ago [address], [address], [place], [place]    Office Visit    2 months ago Acute pain of left shoulder    [facility], [address], [place], [place]    Office Visit    7 months ago Encounter for annual health examination    Tracy Aldana MD    Office Visit    1 year ago Essential hypertension    Tracy Aldana MD    Office Visit    1 year ago Hiatal hernia Inocencio Lira MD    Office Visit

## 2023-07-24 RX ORDER — CHOLECALCIFEROL (VITAMIN D3) 125 MCG
CAPSULE ORAL
Qty: 90 TABLET | Refills: 3 | Status: SHIPPED | OUTPATIENT
Start: 2023-07-24

## 2023-08-03 ENCOUNTER — HOSPITAL ENCOUNTER (OUTPATIENT)
Dept: MAMMOGRAPHY | Facility: HOSPITAL | Age: 78
Discharge: HOME | End: 2023-08-03
Attending: PHYSICIAN ASSISTANT
Payer: MEDICARE

## 2023-08-03 ENCOUNTER — HOSPITAL ENCOUNTER (OUTPATIENT)
Dept: ULTRASOUND IMAGING | Facility: HOSPITAL | Age: 78
Discharge: HOME | End: 2023-08-03
Attending: PHYSICIAN ASSISTANT
Payer: MEDICARE

## 2023-08-03 DIAGNOSIS — R92.8 ABNORMAL MAMMOGRAM: ICD-10-CM

## 2023-08-03 PROCEDURE — 76642 ULTRASOUND BREAST LIMITED: CPT | Performed by: PHYSICIAN ASSISTANT

## 2023-08-03 PROCEDURE — 77065 DX MAMMO INCL CAD UNI: CPT | Performed by: PHYSICIAN ASSISTANT

## 2023-08-03 PROCEDURE — 77061 BREAST TOMOSYNTHESIS UNI: CPT | Performed by: PHYSICIAN ASSISTANT

## 2023-08-15 ENCOUNTER — OFFICE VISIT (OUTPATIENT)
Dept: INTERNAL MEDICINE CLINIC | Facility: CLINIC | Age: 78
End: 2023-08-15

## 2023-08-15 ENCOUNTER — NURSE TRIAGE (OUTPATIENT)
Dept: FAMILY MEDICINE CLINIC | Facility: CLINIC | Age: 78
End: 2023-08-15

## 2023-08-15 VITALS
DIASTOLIC BLOOD PRESSURE: 57 MMHG | BODY MASS INDEX: 19.84 KG/M2 | SYSTOLIC BLOOD PRESSURE: 98 MMHG | HEIGHT: 63 IN | WEIGHT: 112 LBS

## 2023-08-15 DIAGNOSIS — I73.9 PAD (PERIPHERAL ARTERY DISEASE) (HCC): Primary | ICD-10-CM

## 2023-08-15 DIAGNOSIS — R01.1 CARDIAC MURMUR: ICD-10-CM

## 2023-08-15 PROCEDURE — 3078F DIAST BP <80 MM HG: CPT | Performed by: NURSE PRACTITIONER

## 2023-08-15 PROCEDURE — 3008F BODY MASS INDEX DOCD: CPT | Performed by: NURSE PRACTITIONER

## 2023-08-15 PROCEDURE — 3074F SYST BP LT 130 MM HG: CPT | Performed by: NURSE PRACTITIONER

## 2023-08-15 PROCEDURE — 99203 OFFICE O/P NEW LOW 30 MIN: CPT | Performed by: NURSE PRACTITIONER

## 2023-08-15 PROCEDURE — 1159F MED LIST DOCD IN RCRD: CPT | Performed by: NURSE PRACTITIONER

## 2023-08-15 PROCEDURE — 1160F RVW MEDS BY RX/DR IN RCRD: CPT | Performed by: NURSE PRACTITIONER

## 2023-08-15 PROCEDURE — 1126F AMNT PAIN NOTED NONE PRSNT: CPT | Performed by: NURSE PRACTITIONER

## 2023-08-15 RX ORDER — OXYCODONE HYDROCHLORIDE 5 MG/1
5 TABLET ORAL EVERY 6 HOURS PRN
COMMUNITY
Start: 2023-04-01

## 2023-08-15 NOTE — TELEPHONE ENCOUNTER
Action Requested: Summary for Provider     []  Critical Lab, Recommendations Needed  [] Need Additional Advice  []   FYI    []   Need Orders  [] Need Medications Sent to Pharmacy  []  Other     SUMMARY: Patient states for 1 month she has been having bilateral foot swelling and numbness. Feet feel tight. Swelling goes to the ankles. Sometimes feet get purplish and cold. She is able to walk and put on her shoes. Patient states symptoms started after she had hiatal hernia repair. Patient denies calf pain,cough, or shortness of breath    Per protocol, patient scheduled for an office visit today with [address] Patient advised to elevate feet as much as possible.      Future Appointments   Date Time Provider Golden Salazar   8/15/2023  2:40 PM Sudha Rivera., APRN [facility] [place]         Reason for call: Swelling Edema and Foot Swelling  Onset: 1 month  Reason for Disposition   MILD swelling of both ankles (i.e., pedal edema) AND new-onset or worsening    Protocols used: Leg Swelling and Edema-A-OH

## 2023-08-18 ENCOUNTER — HOSPITAL ENCOUNTER (OUTPATIENT)
Dept: ULTRASOUND IMAGING | Facility: HOSPITAL | Age: 78
Discharge: HOME | End: 2023-08-18
Attending: NURSE PRACTITIONER
Payer: MEDICARE

## 2023-08-18 DIAGNOSIS — I73.9 PAD (PERIPHERAL ARTERY DISEASE) (HCC): ICD-10-CM

## 2023-08-18 PROCEDURE — 93925 LOWER EXTREMITY STUDY: CPT | Performed by: NURSE PRACTITIONER

## 2023-08-19 DIAGNOSIS — I73.9 PAD (PERIPHERAL ARTERY DISEASE) (HCC): Primary | ICD-10-CM

## 2023-08-23 ENCOUNTER — HOSPITAL ENCOUNTER (OUTPATIENT)
Dept: CV DIAGNOSTICS | Facility: HOSPITAL | Age: 78
Discharge: HOME | End: 2023-08-23
Attending: NURSE PRACTITIONER
Payer: MEDICARE

## 2023-08-23 DIAGNOSIS — R01.1 CARDIAC MURMUR: ICD-10-CM

## 2023-08-23 PROCEDURE — 93306 TTE W/DOPPLER COMPLETE: CPT | Performed by: NURSE PRACTITIONER

## 2023-08-24 ENCOUNTER — TELEPHONE (OUTPATIENT)
Dept: INTERNAL MEDICINE CLINIC | Facility: CLINIC | Age: 78
End: 2023-08-24

## 2023-08-24 NOTE — TELEPHONE ENCOUNTER
Daughter calling for update on referral. On PARVIN. Daughter informed referral states OPEN. Per daughter, they are not able to schedule Pt unless they receive the referral.  Informed daughter will forward request to managed care and to also follow-up with managed care department for update. Daughter provided with managed care phone number. Please assist. Thanks.

## 2023-09-05 DIAGNOSIS — I51.89 GRADE I DIASTOLIC DYSFUNCTION: Primary | ICD-10-CM

## 2023-09-19 ENCOUNTER — OFFICE VISIT (OUTPATIENT)
Dept: FAMILY MEDICINE CLINIC | Facility: CLINIC | Age: 78
End: 2023-09-19

## 2023-09-19 VITALS
SYSTOLIC BLOOD PRESSURE: 120 MMHG | WEIGHT: 113 LBS | HEART RATE: 56 BPM | BODY MASS INDEX: 20 KG/M2 | DIASTOLIC BLOOD PRESSURE: 76 MMHG | TEMPERATURE: 98 F

## 2023-09-19 DIAGNOSIS — I10 ESSENTIAL HYPERTENSION: ICD-10-CM

## 2023-09-19 DIAGNOSIS — K44.9 PARAESOPHAGEAL HERNIA: ICD-10-CM

## 2023-09-19 DIAGNOSIS — Z12.11 COLON CANCER SCREENING: Primary | ICD-10-CM

## 2023-09-19 DIAGNOSIS — R20.2 BILATERAL LEG PARESTHESIA: ICD-10-CM

## 2023-09-19 DIAGNOSIS — I73.9 PAD (PERIPHERAL ARTERY DISEASE) (HCC): ICD-10-CM

## 2023-09-19 DIAGNOSIS — E46 PROTEIN-CALORIE MALNUTRITION, UNSPECIFIED SEVERITY (HCC): ICD-10-CM

## 2023-09-19 DIAGNOSIS — I35.1 MILD AORTIC REGURGITATION: ICD-10-CM

## 2023-09-19 PROCEDURE — 3078F DIAST BP <80 MM HG: CPT | Performed by: FAMILY MEDICINE

## 2023-09-19 PROCEDURE — 99214 OFFICE O/P EST MOD 30 MIN: CPT | Performed by: FAMILY MEDICINE

## 2023-09-19 PROCEDURE — 1159F MED LIST DOCD IN RCRD: CPT | Performed by: FAMILY MEDICINE

## 2023-09-19 PROCEDURE — 1160F RVW MEDS BY RX/DR IN RCRD: CPT | Performed by: FAMILY MEDICINE

## 2023-09-19 PROCEDURE — 1126F AMNT PAIN NOTED NONE PRSNT: CPT | Performed by: FAMILY MEDICINE

## 2023-09-19 PROCEDURE — 3074F SYST BP LT 130 MM HG: CPT | Performed by: FAMILY MEDICINE

## 2023-10-10 ENCOUNTER — HOSPITAL ENCOUNTER (OUTPATIENT)
Dept: ULTRASOUND IMAGING | Facility: HOSPITAL | Age: 78
Discharge: HOME | End: 2023-10-10
Attending: FAMILY MEDICINE
Payer: MEDICARE

## 2023-10-10 DIAGNOSIS — I73.9 PAD (PERIPHERAL ARTERY DISEASE) (HCC): ICD-10-CM

## 2023-10-10 DIAGNOSIS — R20.2 NUMBNESS AND TINGLING OF RIGHT LOWER EXTREMITY: ICD-10-CM

## 2023-10-10 DIAGNOSIS — M79.605 PAIN IN BOTH LOWER EXTREMITIES: ICD-10-CM

## 2023-10-10 DIAGNOSIS — R20.0 NUMBNESS AND TINGLING OF RIGHT LOWER EXTREMITY: ICD-10-CM

## 2023-10-10 DIAGNOSIS — M79.604 PAIN IN BOTH LOWER EXTREMITIES: ICD-10-CM

## 2023-10-10 PROCEDURE — 93970 EXTREMITY STUDY: CPT | Performed by: FAMILY MEDICINE

## 2023-10-24 ENCOUNTER — OFFICE VISIT (OUTPATIENT)
Dept: FAMILY MEDICINE CLINIC | Facility: CLINIC | Age: 78
End: 2023-10-24

## 2023-10-24 VITALS
DIASTOLIC BLOOD PRESSURE: 73 MMHG | SYSTOLIC BLOOD PRESSURE: 112 MMHG | HEART RATE: 65 BPM | BODY MASS INDEX: 20 KG/M2 | WEIGHT: 114 LBS

## 2023-10-24 DIAGNOSIS — L23.1 ALLERGIC CONTACT DERMATITIS DUE TO ADHESIVES: ICD-10-CM

## 2023-10-24 DIAGNOSIS — M85.80 OSTEOPENIA, UNSPECIFIED LOCATION: ICD-10-CM

## 2023-10-24 DIAGNOSIS — E46 PROTEIN-CALORIE MALNUTRITION, UNSPECIFIED SEVERITY (HCC): ICD-10-CM

## 2023-10-24 DIAGNOSIS — K31.84 GASTROPARESIS: ICD-10-CM

## 2023-10-24 DIAGNOSIS — E03.9 HYPOTHYROIDISM, UNSPECIFIED TYPE: ICD-10-CM

## 2023-10-24 DIAGNOSIS — K22.2 SCHATZKI'S RING: ICD-10-CM

## 2023-10-24 DIAGNOSIS — R14.2 FLATULENCE, ERUCTATION, AND GAS PAIN: ICD-10-CM

## 2023-10-24 DIAGNOSIS — K44.9 PARAESOPHAGEAL HERNIA: ICD-10-CM

## 2023-10-24 DIAGNOSIS — I10 ESSENTIAL HYPERTENSION: ICD-10-CM

## 2023-10-24 DIAGNOSIS — E87.6 HYPOKALEMIA: ICD-10-CM

## 2023-10-24 DIAGNOSIS — I70.0 ATHEROSCLEROSIS OF AORTA (HCC): ICD-10-CM

## 2023-10-24 DIAGNOSIS — K21.9 GASTROESOPHAGEAL REFLUX DISEASE, UNSPECIFIED WHETHER ESOPHAGITIS PRESENT: ICD-10-CM

## 2023-10-24 DIAGNOSIS — J44.89 ASTHMA WITH COPD (CHRONIC OBSTRUCTIVE PULMONARY DISEASE): Chronic | ICD-10-CM

## 2023-10-24 DIAGNOSIS — K57.90 DIVERTICULOSIS: ICD-10-CM

## 2023-10-24 DIAGNOSIS — F33.0 MILD EPISODE OF RECURRENT MAJOR DEPRESSIVE DISORDER (HCC): ICD-10-CM

## 2023-10-24 DIAGNOSIS — I73.9 PAD (PERIPHERAL ARTERY DISEASE) (HCC): ICD-10-CM

## 2023-10-24 DIAGNOSIS — K29.50 CHRONIC GASTRITIS WITHOUT BLEEDING, UNSPECIFIED GASTRITIS TYPE: ICD-10-CM

## 2023-10-24 DIAGNOSIS — R14.1 FLATULENCE, ERUCTATION, AND GAS PAIN: ICD-10-CM

## 2023-10-24 DIAGNOSIS — R91.1 NODULE OF LEFT LUNG: ICD-10-CM

## 2023-10-24 DIAGNOSIS — Z00.00 ENCOUNTER FOR ANNUAL HEALTH EXAMINATION: Primary | ICD-10-CM

## 2023-10-24 DIAGNOSIS — K64.8 INTERNAL HEMORRHOIDS: ICD-10-CM

## 2023-10-24 DIAGNOSIS — K44.9 HIATAL HERNIA: ICD-10-CM

## 2023-10-24 DIAGNOSIS — E78.2 MIXED HYPERLIPIDEMIA: ICD-10-CM

## 2023-10-24 DIAGNOSIS — K42.9 UMBILICAL HERNIA WITHOUT OBSTRUCTION AND WITHOUT GANGRENE: ICD-10-CM

## 2023-10-24 DIAGNOSIS — R14.3 FLATULENCE, ERUCTATION, AND GAS PAIN: ICD-10-CM

## 2023-10-24 PROBLEM — R10.84 ABDOMINAL PAIN, GENERALIZED: Status: RESOLVED | Noted: 2022-07-08 | Resolved: 2023-10-24

## 2023-10-24 PROCEDURE — G0439 PPPS, SUBSEQ VISIT: HCPCS | Performed by: FAMILY MEDICINE

## 2023-10-24 PROCEDURE — 1159F MED LIST DOCD IN RCRD: CPT | Performed by: FAMILY MEDICINE

## 2023-10-24 PROCEDURE — 1170F FXNL STATUS ASSESSED: CPT | Performed by: FAMILY MEDICINE

## 2023-10-24 PROCEDURE — 96160 PT-FOCUSED HLTH RISK ASSMT: CPT | Performed by: FAMILY MEDICINE

## 2023-10-24 PROCEDURE — 3078F DIAST BP <80 MM HG: CPT | Performed by: FAMILY MEDICINE

## 2023-10-24 PROCEDURE — 1160F RVW MEDS BY RX/DR IN RCRD: CPT | Performed by: FAMILY MEDICINE

## 2023-10-24 PROCEDURE — 3074F SYST BP LT 130 MM HG: CPT | Performed by: FAMILY MEDICINE

## 2023-10-24 PROCEDURE — 1126F AMNT PAIN NOTED NONE PRSNT: CPT | Performed by: FAMILY MEDICINE

## 2023-10-24 RX ORDER — ROSUVASTATIN CALCIUM 5 MG/1
5 TABLET, COATED ORAL NIGHTLY
Qty: 90 TABLET | Refills: 3 | Status: SHIPPED | OUTPATIENT
Start: 2023-10-24

## 2023-10-24 RX ORDER — ROSUVASTATIN CALCIUM 20 MG/1
20 TABLET, COATED ORAL DAILY
COMMUNITY
Start: 2023-10-09 | End: 2023-10-24

## 2023-10-30 PROBLEM — F32.9 REACTIVE DEPRESSION: Status: RESOLVED | Noted: 2019-09-16 | Resolved: 2023-10-30

## 2023-10-30 PROBLEM — F33.0 MILD EPISODE OF RECURRENT MAJOR DEPRESSIVE DISORDER: Status: ACTIVE | Noted: 2023-10-30

## 2023-10-30 PROBLEM — F33.0 MILD EPISODE OF RECURRENT MAJOR DEPRESSIVE DISORDER (HCC): Status: ACTIVE | Noted: 2023-10-30

## 2023-11-02 RX ORDER — LEVOTHYROXINE SODIUM 0.07 MG/1
75 TABLET ORAL
Qty: 90 TABLET | Refills: 3 | Status: SHIPPED | OUTPATIENT
Start: 2023-11-02

## 2023-11-02 NOTE — TELEPHONE ENCOUNTER
Refill passed per [place] [employer] protocol.      Requested Prescriptions   Pending Prescriptions Disp Refills    LEVOTHYROXINE 75 MCG Oral Tab [Pharmacy Med Name: Levothyroxine Sodium 75 Mcg Tab Lupi] 90 tablet 0     Sig: Take 1 tablet by mouth before breakfast.       Thyroid Medication Protocol Passed - 11/2/2023  8:38 AM        Passed - TSH in past 12 months        Passed - Last TSH value is normal     Lab Results   Component Value Date    TSH 1.390 11/26/2022                 Passed - In person appointment or virtual visit in the past 12 mos or appointment in next 3 mos     Recent Outpatient Visits              1 week ago [facility] for annual health examination    [facility]    Office Visit    1 month ago Colon cancer screening    [address] [facility]    Office Visit    2 months ago PAD (peripheral artery disease) [facility])    [address], [place], Collette Lusty., CHAN    Office Visit    3 months ago [address], [address], [place] Carolee Hastings PA-C    Office Visit    5 months ago Acute pain of left shoulder    [facility], [address], [place] Carolee Hastings [employer]    Office Visit          Future Appointments         Provider Department Appt Notes    In 3 months [facility] [place] [address] for Health                             Future Appointments         Provider Department Appt Notes    In 3 months [facility] [place] [address] for Health              Recent Outpatient Visits              1 week ago Encounter for annual health examination    [address] Nadya Frey MD    Office Visit    1 month ago Colon cancer screening    [address] Nadya Frey MD    Office Visit    2 months ago PAD (peripheral artery disease) ([facility] [address]    [facility], [address], [place] Trice Rivera., CHAN    Office Visit    3 months ago [facility], [address], [place], [employer]    Office Visit    5 months ago Acute pain of left shoulder    [facility], [address], [place], [employer]    Office Visit

## 2023-11-08 ENCOUNTER — LAB ENCOUNTER (OUTPATIENT)
Dept: LAB | Age: 78
End: 2023-11-08
Attending: FAMILY MEDICINE
Payer: MEDICARE

## 2023-11-08 DIAGNOSIS — Z00.00 ENCOUNTER FOR ANNUAL HEALTH EXAMINATION: ICD-10-CM

## 2023-11-08 LAB
ALBUMIN SERPL-MCNC: 4.4 G/DL (ref 3.2–4.8)
ALBUMIN/GLOB SERPL: 1.6 {RATIO} (ref 1–2)
ALP LIVER SERPL-CCNC: 57 U/L
ALT SERPL-CCNC: 12 U/L
ANION GAP SERPL CALC-SCNC: 3 MMOL/L (ref 0–18)
AST SERPL-CCNC: 21 U/L (ref ?–34)
BASOPHILS # BLD AUTO: 0.03 X10(3) UL (ref 0–0.2)
BASOPHILS NFR BLD AUTO: 0.7 %
BILIRUB SERPL-MCNC: 0.4 MG/DL (ref 0.2–1.1)
BUN BLD-MCNC: 10 MG/DL (ref 9–23)
BUN/CREAT SERPL: 12.7 (ref 10–20)
CALCIUM BLD-MCNC: 9.8 MG/DL (ref 8.7–10.4)
CHLORIDE SERPL-SCNC: 109 MMOL/L (ref 98–112)
CHOLEST SERPL-MCNC: 162 MG/DL (ref ?–200)
CO2 SERPL-SCNC: 30 MMOL/L (ref 21–32)
CREAT BLD-MCNC: 0.79 MG/DL
DEPRECATED RDW RBC AUTO: 41.1 FL (ref 35.1–46.3)
EGFRCR SERPLBLD CKD-EPI 2021: 77 ML/MIN/1.73M2 (ref 60–?)
EOSINOPHIL # BLD AUTO: 0.19 X10(3) UL (ref 0–0.7)
EOSINOPHIL NFR BLD AUTO: 4.5 %
ERYTHROCYTE [DISTWIDTH] IN BLOOD BY AUTOMATED COUNT: 11.7 % (ref 11–15)
EST. AVERAGE GLUCOSE BLD GHB EST-MCNC: 120 MG/DL (ref 68–126)
FASTING PATIENT LIPID ANSWER: YES
FASTING STATUS PATIENT QL REPORTED: YES
GLOBULIN PLAS-MCNC: 2.8 G/DL (ref 2.8–4.4)
GLUCOSE BLD-MCNC: 99 MG/DL (ref 70–99)
HBA1C MFR BLD: 5.8 % (ref ?–5.7)
HCT VFR BLD AUTO: 42.4 %
HDLC SERPL-MCNC: 48 MG/DL (ref 40–59)
HGB BLD-MCNC: 14 G/DL
IMM GRANULOCYTES # BLD AUTO: 0.01 X10(3) UL (ref 0–1)
IMM GRANULOCYTES NFR BLD: 0.2 %
LDLC SERPL CALC-MCNC: 92 MG/DL (ref ?–100)
LYMPHOCYTES # BLD AUTO: 1.69 X10(3) UL (ref 1–4)
LYMPHOCYTES NFR BLD AUTO: 40.1 %
MCH RBC QN AUTO: 31.5 PG (ref 26–34)
MCHC RBC AUTO-ENTMCNC: 33 G/DL (ref 31–37)
MCV RBC AUTO: 95.3 FL
MONOCYTES # BLD AUTO: 0.5 X10(3) UL (ref 0.1–1)
MONOCYTES NFR BLD AUTO: 11.9 %
NEUTROPHILS # BLD AUTO: 1.79 X10 (3) UL (ref 1.5–7.7)
NEUTROPHILS # BLD AUTO: 1.79 X10(3) UL (ref 1.5–7.7)
NEUTROPHILS NFR BLD AUTO: 42.6 %
NONHDLC SERPL-MCNC: 114 MG/DL (ref ?–130)
OSMOLALITY SERPL CALC.SUM OF ELEC: 293 MOSM/KG (ref 275–295)
PLATELET # BLD AUTO: 245 10(3)UL (ref 150–450)
POTASSIUM SERPL-SCNC: 5 MMOL/L (ref 3.5–5.1)
PROT SERPL-MCNC: 7.2 G/DL (ref 5.7–8.2)
RBC # BLD AUTO: 4.45 X10(6)UL
SODIUM SERPL-SCNC: 142 MMOL/L (ref 136–145)
TRIGL SERPL-MCNC: 126 MG/DL (ref 30–149)
TSI SER-ACNC: 2.31 MIU/ML (ref 0.55–4.78)
VLDLC SERPL CALC-MCNC: 20 MG/DL (ref 0–30)
WBC # BLD AUTO: 4.2 X10(3) UL (ref 4–11)

## 2023-11-08 PROCEDURE — 80053 COMPREHEN METABOLIC PANEL: CPT

## 2023-11-08 PROCEDURE — 80061 LIPID PANEL: CPT

## 2023-11-08 PROCEDURE — 85025 COMPLETE CBC W/AUTO DIFF WBC: CPT

## 2023-11-08 PROCEDURE — 36415 COLL VENOUS BLD VENIPUNCTURE: CPT

## 2023-11-08 PROCEDURE — 84443 ASSAY THYROID STIM HORMONE: CPT

## 2023-11-08 PROCEDURE — 83036 HEMOGLOBIN GLYCOSYLATED A1C: CPT

## 2024-02-05 ENCOUNTER — TELEPHONE (OUTPATIENT)
Facility: CLINIC | Age: 79
End: 2024-02-05

## 2024-02-05 ENCOUNTER — OFFICE VISIT (OUTPATIENT)
Facility: CLINIC | Age: 79
End: 2024-02-05
Payer: MEDICARE

## 2024-02-05 VITALS
HEART RATE: 76 BPM | DIASTOLIC BLOOD PRESSURE: 64 MMHG | SYSTOLIC BLOOD PRESSURE: 133 MMHG | WEIGHT: 115.69 LBS | BODY MASS INDEX: 20.5 KG/M2 | HEIGHT: 63 IN

## 2024-02-05 DIAGNOSIS — Z12.11 COLON CANCER SCREENING: Primary | ICD-10-CM

## 2024-02-05 DIAGNOSIS — K44.9 HIATAL HERNIA: ICD-10-CM

## 2024-02-05 PROCEDURE — 3075F SYST BP GE 130 - 139MM HG: CPT | Performed by: PHYSICIAN ASSISTANT

## 2024-02-05 PROCEDURE — 1126F AMNT PAIN NOTED NONE PRSNT: CPT | Performed by: PHYSICIAN ASSISTANT

## 2024-02-05 PROCEDURE — 1160F RVW MEDS BY RX/DR IN RCRD: CPT | Performed by: PHYSICIAN ASSISTANT

## 2024-02-05 PROCEDURE — 3078F DIAST BP <80 MM HG: CPT | Performed by: PHYSICIAN ASSISTANT

## 2024-02-05 PROCEDURE — 99215 OFFICE O/P EST HI 40 MIN: CPT | Performed by: PHYSICIAN ASSISTANT

## 2024-02-05 PROCEDURE — 1159F MED LIST DOCD IN RCRD: CPT | Performed by: PHYSICIAN ASSISTANT

## 2024-02-05 PROCEDURE — 3008F BODY MASS INDEX DOCD: CPT | Performed by: PHYSICIAN ASSISTANT

## 2024-02-05 RX ORDER — ROSUVASTATIN CALCIUM 20 MG/1
20 TABLET, COATED ORAL NIGHTLY
COMMUNITY
Start: 2024-01-29

## 2024-02-05 RX ORDER — SODIUM, POTASSIUM,MAG SULFATES 17.5-3.13G
SOLUTION, RECONSTITUTED, ORAL ORAL
Qty: 1 EACH | Refills: 0 | Status: SHIPPED | OUTPATIENT
Start: 2024-02-05

## 2024-02-05 NOTE — PATIENT INSTRUCTIONS
1. Schedule colonoscopy with Dr. Cleveland with [facility] [Diagnosis: crc screening]    2.  bowel prep from pharmacy (split suprep)    3. Continue all medications as normal for your procedure.    4. Read all bowel prep instructions carefully. Bowel prep instructions can also be found online at:  www.[facility].org/giprep     5. AVOID seeds, nuts, popcorn, raw fruits and vegetables for 3 days before procedure    6. You MAY need to go for COVID testing 72 hours before procedure. The testing team will call you a few days before your procedure to discuss with you if testing is required. If you are asked to go for COVID testing and do not completed the test, the procedure cannot be performed.     7. If you start any NEW medication after your visit today, please notify us. Certain medications (like iron or weight loss medications) will need to be held before the procedure, or the procedure cannot be performed safely.

## 2024-02-05 NOTE — H&P
[facility] - Gastroenterology                                                                                                               Reason for consult:   Chief Complaint   Patient presents with    Colonoscopy Screening       Requesting physician or provider: Daiana Marrero MD      HPI:   Mary Robledo is a 79 year old year-old female with history of HTN, hypothyroidism, GERD, paraesophageal hernia s/p hiatal hernia repair (3/31/23), gastroparesis who presents for crc screening.    CRC screening- Has bowel movement every other day and can have some straining with BM. She takes prune juice and additional fiber to assist with BM. She makes sure to hit 64 oz of water on a daily basis. Tries to exercise on a regular basis.  she denies brbpr and/or melena.    Hx of paraesophageal hernia- Patient doing well s/p repair in March 2023. She notes she is able to eat more. Has a good appetite. she denies dysphagia, odynophagia and/or globus. she denies abdominal pain. she denies nausea and/or vomiting. she denies bloating.    NSAIDS: none  Tobacco: none  Alcohol: none  Marijuana: none  Illicit drugs: none    FH GI malignancy- none    No history of adverse reaction to sedation  No JAIRO  No anticoagulants  No pacemaker/defibrillator  No pain medications and/or sleep aides    Last endoscopies:  Colonoscopy 6/2016  FINDINGS:  1.  Diverticular disease located in the sigmoid and descending   colon.  2.  Internal hemorrhoids, moderate in size.  3.  No masses or polyps.     RECOMMENDATIONS:  1.  Repeat colonoscopy within five to seven years.  2.  High fiber diet for diverticular disease.  3.  Symptomatic treatment of hemorrhoids.      Wt Readings from Last 6 Encounters:   02/05/24 115 lb 11.2 oz (52.5 kg)   10/24/23 114 lb (51.7 kg)   09/19/23 113 lb (51.3 kg)   08/15/23 112 lb (50.8 kg)   07/06/23 111 lb (50.3 kg)   05/18/23 110 lb (49.9 kg)        History, Medications, Allergies,  ROS:      Past Medical History:   Diagnosis Date    Age-related nuclear cataract of both eyes 2015    Asthma     Disorder of thyroid     Floaters- per history, but none seen on exam 2015    High blood pressure     High cholesterol     Hyperlipidemia     Hypothyroidism     PSC (posterior subcapsular cataract), right 2015      Past Surgical History:   Procedure Laterality Date    APPENDECTOMY      CATARACT EXTRACTION W/  INTRAOCULAR LENS IMPLANT Right 3/2/17    Dr. August @ [facility]    CATARACT EXTRACTION W/  INTRAOCULAR LENS IMPLANT Left 17    Dr. August @ [facility]    CHOLECYSTECTOMY      COLONOSCOPY  2016    EGD  2019    HYSTERECTOMY        Family Hx:   Family History   Problem Relation Age of Onset    Lipids Other         family h/o hyperlipidemia    Heart Disorder Other         family h/o Vascular disease    Cancer Brother     Diabetes Neg     Glaucoma Neg     Macular degeneration Neg       Social History:   Social History     Socioeconomic History    Marital status:    Tobacco Use    Smoking status: Former     Packs/day: 1     Types: Cigarettes     Quit date:      Years since quittin.1    Smokeless tobacco: Never   Vaping Use    Vaping Use: Never used   Substance and Sexual Activity    Alcohol use: No    Drug use: No   Other Topics Concern    Caffeine Concern Yes     Comment: soda-1 cup/day    Pt has a pacemaker No    Pt has a defibrillator No    Reaction to local anesthetic No        Medications (Active prior to today's visit):  Current Outpatient Medications   Medication Sig Dispense Refill    rosuvastatin 20 MG Oral Tab Take 1 tablet (20 mg total) by mouth nightly.      Na Sulfate-K Sulfate-Mg Sulf (SUPREP BOWEL PREP KIT) 17.5-3.13-1.6 GM/177ML Oral Solution Take prep as directed by gastro office. May substitute with Trilyte/generic equivalent if needed. 1 each 0    levothyroxine 75 MCG Oral Tab Take 1 tablet (75 mcg total) by mouth before breakfast. 90 tablet 3    rosuvastatin  5 MG Oral Tab Take 1 tablet (5 mg total) by mouth nightly. 90 tablet 3    cholecalciferol 50 MCG (2000 UT) Oral Tab TAKE 1 TABLET BY MOUTH 1 TIME DAILY 90 tablet 3    Simethicone 125 MG Oral Cap Take 1 capsule by mouth.      lisinopril 10 MG Oral Tab Take 1 tablet (10 mg total) by mouth daily. 90 tablet 3    aspirin 81 MG Oral Tab EC Take 1 tablet (81 mg total) by mouth daily.      oxyCODONE 5 MG Oral Tab Take 1 tablet (5 mg total) by mouth every 6 (six) hours as needed for Pain. (Patient not taking: Reported on 9/19/2023)      Albuterol Sulfate HFA (VENTOLIN HFA) 108 (90 Base) MCG/ACT Inhalation Aero Soln Inhale 2 puffs into the lungs every 4 (four) hours as needed for Wheezing. (Patient not taking: Reported on 7/6/2023) 1 Inhaler 1    prednisoLONE acetate 1 % Ophthalmic Suspension   (Patient not taking: Reported on 9/19/2023)      Fluticasone Furoate (ARNUITY ELLIPTA) 100 MCG/ACT Inhalation Aerosol Powder, Breath Activated Inhale 1 puff into the lungs daily. (Patient not taking: Reported on 7/6/2023) 30 each 1       Allergies:  Allergies   Allergen Reactions    Adhesive Tape RASH     ekg pads.    Codeine SWELLING    Triamazide [Hydrochlorothiazide W/Triamterene] OTHER (SEE COMMENTS)     Hypokalemia.    Dust Mite Extract OTHER (SEE COMMENTS)    Nystatin RASH and ITCHING       ROS:   CONSTITUTIONAL: negative for fevers, chills, sweats and weight loss  EYES Negative for red eyes, yellow eyes, changes in vision  HEENT: Negative for dysphagia and hoarseness  RESPIRATORY: Negative for cough and shortness of breath  CARDIOVASCULAR: Negative for chest pain, palpitations  GASTROINTESTINAL: See HPI  GENITOURINARY: Negative for dysuria and frequency  MUSCULOSKELETAL: Negative for arthralgias and myalgias  NEUROLOGICAL: Negative for dizziness and headaches  BEHAVIOR/PSYCH: Negative for anxiety and poor appetite    PHYSICAL EXAM:   Blood pressure 133/64, pulse 76, height 5' 3\" (1.6 m), weight 115 lb 11.2 oz (52.5 kg), not  currently breastfeeding.    GEN: WD/WN, NAD  HEENT: Supple symmetrical, trachea midline  CV: RRR, the extremities are warm and well perfused   LUNGS: No increased work of breathing  ABDOMEN: No scars, normal bowel sounds, soft, non-tender, non-distended no rebound or guarding, no masses, no hepatomegaly  MSK: No redness, no warmth, no swelling of joints  SKIN: No jaundice, no erythema, no rashes  HEMATOLOGIC: No bleeding, no bruising  NEURO: Alert and interactive, normal gait    Labs/Imaging/Procedures:     Patient's pertinent labs and imaging were reviewed and discussed with patient today.     Lab Results   Component Value Date    WBC 4.2 11/08/2023    RBC 4.45 11/08/2023    HGB 14.0 11/08/2023    HCT 42.4 11/08/2023    MCV 95.3 11/08/2023    MCH 31.5 11/08/2023    MCHC 33.0 11/08/2023    RDW 11.7 11/08/2023    .0 11/08/2023    MPV 7.8 10/20/2017        Lab Results   Component Value Date    GLU 99 11/08/2023    BUN 10 11/08/2023    BUNCREA 12.7 11/08/2023    CREATSERUM 0.79 11/08/2023    ANIONGAP 3 11/08/2023    GFRNAA 79 11/22/2021    GFRAA 91 11/22/2021    CA 9.8 11/08/2023    OSMOCALC 293 11/08/2023    ALKPHO 57 11/08/2023    AST 21 11/08/2023    ALT 12 11/08/2023    ALKPHOS 51 09/13/2016    BILT 0.4 11/08/2023    TP 7.2 11/08/2023    ALB 4.4 11/08/2023    GLOBULIN 2.8 11/08/2023    AGRATIO 1.3 09/13/2016     11/08/2023    K 5.0 11/08/2023     11/08/2023    CO2 30.0 11/08/2023        No results found.          .  ASSESSMENT/PLAN:   Mary Robledo is a 79 year old year-old female with history of HTN, hypothyroidism, GERD, paraesophageal hernia s/p hiatal hernia repair (3/31/23), gastroparesis who presents for crc screening.    #crc screening  Patient last cln in 2016 with Dr. Cleveland. No polyps noted. Advised for 8 year recall. Can have straining with BM, but does incorporate fiber and water to assist. Denies brbpr or melena. No unintentional weight loss or decreased appetite. No family hx of colon  cancer. Advised for colonoscopy at this time. Patient is agreeable to plan.     #hx of hiatal hernia  Patient doing well after repair. No nausea, vomiting. Appetite has improved. Gaining weight. Advised to continue to monitor symptoms.       1. Schedule colonoscopy with Dr. Cleveland with [facility] [Diagnosis: crc screening]    2.  bowel prep from pharmacy (split suprep)    3. Continue all medications as normal for your procedure.    4. Read all bowel prep instructions carefully. Bowel prep instructions can also be found online at:  www.[facility].org/giprep     5. AVOID seeds, nuts, popcorn, raw fruits and vegetables for 3 days before procedure    6. You MAY need to go for COVID testing 72 hours before procedure. The testing team will call you a few days before your procedure to discuss with you if testing is required. If you are asked to go for COVID testing and do not completed the test, the procedure cannot be performed.     7. If you start any NEW medication after your visit today, please notify us. Certain medications (like iron or weight loss medications) will need to be held before the procedure, or the procedure cannot be performed safely.        Orders This Visit:  No orders of the defined types were placed in this encounter.      Meds This Visit:  Requested Prescriptions     Signed Prescriptions Disp Refills    Na Sulfate-K Sulfate-Mg Sulf (SUPREP BOWEL PREP KIT) 17.5-3.13-1.6 GM/177ML Oral Solution 1 each 0     Sig: Take prep as directed by gastro office. May substitute with Trilyte/generic equivalent if needed.       Imaging & Referrals:  None    ENDOSCOPIC RISK BENEFIT DISCUSSION: I described the procedure in great detail with the patient. I discussed the risks and benefits, including but not limited to: bleeding, perforation, infection, anesthesia complications, and even death. Patient will be NPO after midnight and will have a person physically present at time of pick-up to drive patient home. Patient  verbalized understanding and agrees to proceed with procedure as planned.      Kayleigh Marin PA-C   2/5/2024        This note was partially prepared using Dragon Medical voice recognition dictation software. As a result, errors may occur. When identified, these errors have been corrected. While every attempt is made to correct errors during dictation, discrepancies may still exist.

## 2024-02-05 NOTE — TELEPHONE ENCOUNTER
Scheduled for:  Colonoscopy [id number]  Provider Name:  Dr. Cleveland  Date:  3/12/24  Location:[facility]  Sedation:  [facility]  Time:  12:30 Pm (Patient is aware arrival time is at 11:30 Am )  Prep: Suprep  Meds/Allergies Reconciled?:  Kayleigh Marin PA-C Reviewed  Diagnosis with codes:  Colorectal cancer screening Z12.11,Z12.12  Was patient informed to call insurance with codes (Y/N):  Yes  Referral sent?:  Referral was sent at the time of electronic surgical scheduling.  EM or [facility] notified?:  I sent an electronic request to Endo Scheduling and received a confirmation today.  Medication Orders:  Pt is aware to NOT take iron pills, herbal meds and diet supplements for 7 days before exam. Also to NOT take any form of alcohol, recreational drugs and any forms of ED meds 24 hours before exam.   Misc Orders: Patient was informed about the new cancellation policy for his/her procedure. Patient was also given a copy of the cancellation policy at the time of the appointment and verbalized understanding.       Further instructions given by staff:  I provide prep instructions to patient at the time of the appointment and reviewed date, time and location, patient verbalized that she understood and is aware to call if she has any questions.

## 2024-02-06 ENCOUNTER — HOSPITAL ENCOUNTER (OUTPATIENT)
Dept: ULTRASOUND IMAGING | Facility: HOSPITAL | Age: 79
Discharge: HOME | End: 2024-02-06
Attending: PHYSICIAN ASSISTANT
Payer: MEDICARE

## 2024-02-06 ENCOUNTER — HOSPITAL ENCOUNTER (OUTPATIENT)
Dept: MAMMOGRAPHY | Facility: HOSPITAL | Age: 79
Discharge: HOME | End: 2024-02-06
Attending: PHYSICIAN ASSISTANT
Payer: MEDICARE

## 2024-02-06 DIAGNOSIS — R92.8 ABNORMAL MAMMOGRAM: ICD-10-CM

## 2024-02-06 PROCEDURE — 77065 DX MAMMO INCL CAD UNI: CPT | Performed by: PHYSICIAN ASSISTANT

## 2024-02-06 PROCEDURE — 76642 ULTRASOUND BREAST LIMITED: CPT | Performed by: PHYSICIAN ASSISTANT

## 2024-02-06 PROCEDURE — 77061 BREAST TOMOSYNTHESIS UNI: CPT | Performed by: PHYSICIAN ASSISTANT

## 2024-02-06 NOTE — IMAGING NOTE
Discussed recommended breast biopsy with patient.  Patient is being recommended for stereotactic biopsy of the right  Breast  by Dr. Kenneth Curry  came in to here bx procedural instructions. While I was explaining procedure Dr Cooper returned and informed Mary Robledo and elliot Curry  after looking at images she is now going to recommend a 6 month f/u not a Bx due to looks like area of vessels \" Pt instructed to continue to do self breast exam and f/u with Md if noted any changes. I provided my name and number to pt to call me with any questions. Both discharged ambulatory.

## 2024-04-15 ENCOUNTER — TELEPHONE (OUTPATIENT)
Dept: FAMILY MEDICINE CLINIC | Facility: CLINIC | Age: 79
End: 2024-04-15

## 2024-04-15 DIAGNOSIS — H57.89 EYE DISCHARGE: Primary | ICD-10-CM

## 2024-04-15 NOTE — TELEPHONE ENCOUNTER
Patient is having eye discharge, she generally sees Dr. August with [facility] but he is not available. They have visit today available with below in same group. Appt scheduled at 2:50 today    Pended referral, we will need to fax if approved.   Please advise    Fax [phone]  Dr. Lia Humphrey

## 2024-04-15 NOTE — TELEPHONE ENCOUNTER
Daughter called. Dr Humphrey's office did not get referral.  I refaxed referral again.   Confirmation received on my end.    Otherwise call referral dept if any further problems. Ph# given to daughter.

## 2024-04-30 DIAGNOSIS — I10 ESSENTIAL HYPERTENSION: ICD-10-CM

## 2024-05-01 RX ORDER — LISINOPRIL 10 MG/1
10 TABLET ORAL DAILY
Qty: 90 TABLET | Refills: 3 | Status: SHIPPED | OUTPATIENT
Start: 2024-05-01

## 2024-05-01 NOTE — TELEPHONE ENCOUNTER
Refill passed per [facility] protocol.  Requested Prescriptions   Pending Prescriptions Disp Refills    LISINOPRIL 10 MG Oral Tab [Pharmacy Med Name: Lisinopril 10 Mg Tab Lupi] 90 tablet 0     Sig: TAKE ONE TABLET BY MOUTH ONE TIME DAILY       Hypertension Medications Protocol Passed - 4/30/2024  1:31 AM        Passed - CMP or BMP in past 12 months        Passed - Last BP reading less than 140/90     BP Readings from Last 1 Encounters:   02/05/24 133/64               Passed - In person appointment or virtual visit in the past 12 mos or appointment in next 3 mos     Recent Outpatient Visits              [facility]    Office Visit    6 months ago [facility]    [facility] Visit    7 months ago [facility]    Office Visit    8 months ago [facility]    Office Visit    10 months ago Rash    [facility] [place], Minhxuyen, PA-C    Office Visit          Future Appointments         Provider Department Appt Notes    In 3 months [address] [facility]                     Passed - EGFRCR or GFRNAA > 50     GFR Evaluation  EGFRCR: 77 , resulted on 11/8/2023             Recent Outpatient Visits              2 months ago [facility] cancer [facility]    Office Visit    6 months ago [facility] for annual health examination    [facility]    Office Visit    7 months ago Colon cancer screening    [facility]  [facility], Daiana Ervin MD    Office Visit    8 months ago PAD (peripheral artery disease) (HCC)    [facility] [address], [place] Heather Rivera APRN    Office Visit    10 months ago Rash    [facility] [address], [place] Mary Suh PA-C    Office Visit          Future Appointments         Provider Department Appt Notes    In 3 months [facility]

## 2024-05-06 ENCOUNTER — OFFICE VISIT (OUTPATIENT)
Dept: FAMILY MEDICINE CLINIC | Facility: CLINIC | Age: 79
End: 2024-05-06

## 2024-05-06 VITALS
SYSTOLIC BLOOD PRESSURE: 110 MMHG | WEIGHT: 118 LBS | HEART RATE: 60 BPM | DIASTOLIC BLOOD PRESSURE: 66 MMHG | BODY MASS INDEX: 20.91 KG/M2 | HEIGHT: 63 IN

## 2024-05-06 DIAGNOSIS — E55.9 VITAMIN D DEFICIENCY: ICD-10-CM

## 2024-05-06 DIAGNOSIS — Z78.0 OSTEOPENIA AFTER MENOPAUSE: ICD-10-CM

## 2024-05-06 DIAGNOSIS — H04.123 DRY EYE SYNDROME OF BOTH EYES: ICD-10-CM

## 2024-05-06 DIAGNOSIS — M85.80 OSTEOPENIA AFTER MENOPAUSE: ICD-10-CM

## 2024-05-06 DIAGNOSIS — E78.2 MIXED HYPERLIPIDEMIA: ICD-10-CM

## 2024-05-06 DIAGNOSIS — R73.03 PREDIABETES: Primary | ICD-10-CM

## 2024-05-06 PROCEDURE — 3008F BODY MASS INDEX DOCD: CPT | Performed by: PHYSICIAN ASSISTANT

## 2024-05-06 PROCEDURE — 99213 OFFICE O/P EST LOW 20 MIN: CPT | Performed by: PHYSICIAN ASSISTANT

## 2024-05-06 PROCEDURE — 3078F DIAST BP <80 MM HG: CPT | Performed by: PHYSICIAN ASSISTANT

## 2024-05-06 PROCEDURE — 1159F MED LIST DOCD IN RCRD: CPT | Performed by: PHYSICIAN ASSISTANT

## 2024-05-06 PROCEDURE — 1126F AMNT PAIN NOTED NONE PRSNT: CPT | Performed by: PHYSICIAN ASSISTANT

## 2024-05-06 PROCEDURE — 3074F SYST BP LT 130 MM HG: CPT | Performed by: PHYSICIAN ASSISTANT

## 2024-05-06 RX ORDER — CHOLECALCIFEROL (VITAMIN D3) 125 MCG
CAPSULE ORAL
Qty: 90 TABLET | Refills: 3 | Status: SHIPPED | OUTPATIENT
Start: 2024-05-06

## 2024-05-06 NOTE — PROGRESS NOTES
HPI:     HPI  A 79-year-old female is here in the office complaining of feeling fatigue for the past 2 days. The patient also feels burning sensation in the eyes for the past 2 weeks.  The patient denies chest pain, SOB, N/V/C/D, fever, dizziness, syncope, eye pain, eye redness, vision changes, and abdominal pain. There are no other concerns today.      Medications:     Current Outpatient Medications   Medication Sig Dispense Refill    cholecalciferol 50 MCG (2000 UT) Oral Tab TAKE 1 TABLET BY MOUTH 1 TIME DAILY 90 tablet 3    lisinopril 10 MG Oral Tab Take 1 tablet (10 mg total) by mouth daily. 90 tablet 3    levothyroxine 75 MCG Oral Tab Take 1 tablet (75 mcg total) by mouth before breakfast. 90 tablet 3    aspirin 81 MG Oral Tab EC Take 1 tablet (81 mg total) by mouth daily.      rosuvastatin 20 MG Oral Tab Take 1 tablet (20 mg total) by mouth nightly.         Allergies:     Allergies   Allergen Reactions    Adhesive Tape RASH     ekg pads.    Codeine SWELLING    Triamazide [Hydrochlorothiazide W/Triamterene] OTHER (SEE COMMENTS)     Hypokalemia.    Dust Mite Extract OTHER (SEE COMMENTS)    Nystatin RASH and ITCHING       History:     Health Maintenance   Topic Date Due    Zoster Vaccines (1 of 2) Never done    Colorectal Cancer Screening  06/01/2023    COVID-19 Vaccine (6 - 2023-24 season) 09/01/2023    MA Annual Health Assessment  01/01/2024    Annual Depression Screening  01/01/2024    Fall Risk Screening (Annual)  01/01/2024    Influenza Vaccine  Completed    DEXA Scan  Completed    Pneumococcal Vaccine: 65+ Years  Completed    Mammogram  Discontinued       No LMP recorded. Patient has had a hysterectomy.   Past Medical History:     Past Medical History:    Age-related nuclear cataract of both eyes    Asthma (HCC)    Disorder of thyroid    Floaters- per history, but none seen on exam    High blood pressure    High cholesterol    Hyperlipidemia    Hypothyroidism    PSC (posterior subcapsular cataract), right        Past Surgical History:     Past Surgical History:   Procedure Laterality Date    Appendectomy      Cataract extraction w/  intraocular lens implant Right 3/2/17    Dr. August @ [facility]    Cataract extraction w/  intraocular lens implant Left 17    Dr. August @ [facility]    Cholecystectomy      Colonoscopy  2016    Egd  2019    Hysterectomy         Family History:     Family History   Problem Relation Age of Onset    Lipids Other         family h/o hyperlipidemia    Heart Disorder Other         family h/o Vascular disease    Cancer Brother     Diabetes Neg     Glaucoma Neg     Macular degeneration Neg        Social History:     Social History     Socioeconomic History    Marital status:      Spouse name: Not on file    Number of children: Not on file    Years of education: Not on file    Highest education level: Not on file   Occupational History    Not on file   Tobacco Use    Smoking status: Former     Current packs/day: 0.00     Types: Cigarettes     Quit date:      Years since quittin.3    Smokeless tobacco: Never   Vaping Use    Vaping status: Never Used   Substance and Sexual Activity    Alcohol use: No    Drug use: No    Sexual activity: Not on file   Other Topics Concern     Service Not Asked    Blood Transfusions Not Asked    Caffeine Concern Yes     Comment: soda-1 cup/day    Occupational Exposure Not Asked    Hobby Hazards Not Asked    Sleep Concern Not Asked    Stress Concern Not Asked    Weight Concern Not Asked    Special Diet Not Asked    Back Care Not Asked    Exercise Not Asked    Bike Helmet Not Asked    Seat Belt Not Asked    Self-Exams Not Asked    Grew up on a farm Not Asked    History of tanning Not Asked    Outdoor occupation Not Asked    Pt has a pacemaker No    Pt has a defibrillator No    Breast feeding Not Asked    Reaction to local anesthetic No   Social History Narrative    Not on file     Social Determinants of Health     Financial Resource Strain: Not on file    Food Insecurity: Not on file   Transportation Needs: Not on file   Physical Activity: Not on file   Stress: Not on file   Social Connections: Unknown (5/13/2021)    [facility]    Social Connections     Conversations with friends/family/neighbors per week: Not on file   Housing Stability: Not on file       Review of Systems:   Review of Systems   Constitutional:  Positive for fatigue.        Vitals:    05/06/24 1100   BP: 110/66   Pulse: 60   Weight: 118 lb (53.5 kg)   Height: 5' 3\" (1.6 m)     Body mass index is 20.9 kg/m².    Physical Exam:   Physical Exam  Eyes:      General:         Right eye: No discharge.         Left eye: No discharge.      Conjunctiva/sclera: Conjunctivae normal.   Cardiovascular:      Rate and Rhythm: Normal rate and regular rhythm.      Heart sounds: Normal heart sounds.          Assessment and Plan::     Problem List Items Addressed This Visit          Cardiac and Vasculature    Mixed hyperlipidemia    Relevant Orders    Lipid Panel    Hemoglobin A1C    Comp Metabolic Panel (14)    Vitamin B12     Other Visit Diagnoses       Prediabetes    -  Primary    Relevant Orders    Lipid Panel    Hemoglobin A1C    TSH W Reflex To Free T4    Osteopenia after menopause        Relevant Medications    cholecalciferol 50 MCG (2000 UT) Oral Tab    Other Relevant Orders    XR DEXA BONE DENSITOMETRY (CPT=77080)    Vitamin D deficiency        Relevant Orders    Vitamin D    Dry eye syndrome of both eyes            - Advise the patient to uses Refresh or Systane for dry eye and apply warm compresses 2-5 minutes daily.    Discussed plan of care with pt and pt is in agreement.All questions answered. Pt to call with questions or concerns.

## 2024-05-08 ENCOUNTER — LAB ENCOUNTER (OUTPATIENT)
Dept: LAB | Age: 79
End: 2024-05-08
Attending: PHYSICIAN ASSISTANT
Payer: MEDICARE

## 2024-05-08 DIAGNOSIS — E55.9 VITAMIN D DEFICIENCY: ICD-10-CM

## 2024-05-08 DIAGNOSIS — R73.03 PREDIABETES: ICD-10-CM

## 2024-05-08 DIAGNOSIS — E78.2 MIXED HYPERLIPIDEMIA: ICD-10-CM

## 2024-05-08 LAB
ALBUMIN SERPL-MCNC: 4.5 G/DL (ref 3.2–4.8)
ALBUMIN/GLOB SERPL: 1.7 {RATIO} (ref 1–2)
ALP LIVER SERPL-CCNC: 54 U/L
ALT SERPL-CCNC: 11 U/L
ANION GAP SERPL CALC-SCNC: 6 MMOL/L (ref 0–18)
AST SERPL-CCNC: 24 U/L (ref ?–34)
BILIRUB SERPL-MCNC: 0.5 MG/DL (ref 0.2–1.1)
BUN BLD-MCNC: 8 MG/DL (ref 9–23)
BUN/CREAT SERPL: 9.8 (ref 10–20)
CALCIUM BLD-MCNC: 10.2 MG/DL (ref 8.7–10.4)
CHLORIDE SERPL-SCNC: 107 MMOL/L (ref 98–112)
CHOLEST SERPL-MCNC: 159 MG/DL (ref ?–200)
CO2 SERPL-SCNC: 30 MMOL/L (ref 21–32)
CREAT BLD-MCNC: 0.82 MG/DL
EGFRCR SERPLBLD CKD-EPI 2021: 74 ML/MIN/1.73M2 (ref 60–?)
EST. AVERAGE GLUCOSE BLD GHB EST-MCNC: 117 MG/DL (ref 68–126)
FASTING PATIENT LIPID ANSWER: YES
FASTING STATUS PATIENT QL REPORTED: YES
GLOBULIN PLAS-MCNC: 2.7 G/DL (ref 2–3.5)
GLUCOSE BLD-MCNC: 100 MG/DL (ref 70–99)
HBA1C MFR BLD: 5.7 % (ref ?–5.7)
HDLC SERPL-MCNC: 55 MG/DL (ref 40–59)
LDLC SERPL CALC-MCNC: 77 MG/DL (ref ?–100)
NONHDLC SERPL-MCNC: 104 MG/DL (ref ?–130)
OSMOLALITY SERPL CALC.SUM OF ELEC: 294 MOSM/KG (ref 275–295)
POTASSIUM SERPL-SCNC: 5 MMOL/L (ref 3.5–5.1)
PROT SERPL-MCNC: 7.2 G/DL (ref 5.7–8.2)
SODIUM SERPL-SCNC: 143 MMOL/L (ref 136–145)
TRIGL SERPL-MCNC: 157 MG/DL (ref 30–149)
TSI SER-ACNC: 1.51 MIU/ML (ref 0.55–4.78)
VIT B12 SERPL-MCNC: 718 PG/ML (ref 211–911)
VIT D+METAB SERPL-MCNC: 62.6 NG/ML (ref 30–100)
VLDLC SERPL CALC-MCNC: 24 MG/DL (ref 0–30)

## 2024-05-08 PROCEDURE — 84443 ASSAY THYROID STIM HORMONE: CPT

## 2024-05-08 PROCEDURE — 80061 LIPID PANEL: CPT

## 2024-05-08 PROCEDURE — 82306 VITAMIN D 25 HYDROXY: CPT

## 2024-05-08 PROCEDURE — 83036 HEMOGLOBIN GLYCOSYLATED A1C: CPT

## 2024-05-08 PROCEDURE — 80053 COMPREHEN METABOLIC PANEL: CPT

## 2024-05-08 PROCEDURE — 36415 COLL VENOUS BLD VENIPUNCTURE: CPT

## 2024-05-08 PROCEDURE — 82607 VITAMIN B-12: CPT

## 2024-05-10 ENCOUNTER — PATIENT MESSAGE (OUTPATIENT)
Dept: FAMILY MEDICINE CLINIC | Facility: CLINIC | Age: 79
End: 2024-05-10

## 2024-05-10 NOTE — TELEPHONE ENCOUNTER
Ammon Suh PA-C, please advise on patient's medication questions, should she stay on same dosages of rosuvastatin 20 mg and levothyroxine 75 mcg?

## 2024-05-10 NOTE — TELEPHONE ENCOUNTER
From: Mary Robledo  To: Mary Suh  Sent: 5/10/2024 9:00 AM CDT  Subject: My medicine dosages    Hello Min,    Should I continue on 20 mg of Rosuvastatin? Also 75 mg everyday of thyroid? Or have my dosages changed based on my bloodwork?     Thank you!     Mary

## 2024-06-05 ENCOUNTER — HOSPITAL ENCOUNTER (OUTPATIENT)
Dept: BONE DENSITY | Age: 79
Discharge: HOME | End: 2024-06-05
Attending: PHYSICIAN ASSISTANT
Payer: MEDICARE

## 2024-06-05 DIAGNOSIS — M85.80 OSTEOPENIA AFTER MENOPAUSE: ICD-10-CM

## 2024-06-05 DIAGNOSIS — Z78.0 OSTEOPENIA AFTER MENOPAUSE: ICD-10-CM

## 2024-06-05 PROCEDURE — 77080 DXA BONE DENSITY AXIAL: CPT | Performed by: PHYSICIAN ASSISTANT

## 2024-07-01 ENCOUNTER — OFFICE VISIT (OUTPATIENT)
Dept: FAMILY MEDICINE CLINIC | Facility: CLINIC | Age: 79
End: 2024-07-01
Payer: MEDICARE

## 2024-07-01 VITALS
WEIGHT: 118 LBS | DIASTOLIC BLOOD PRESSURE: 64 MMHG | HEIGHT: 63 IN | HEART RATE: 64 BPM | SYSTOLIC BLOOD PRESSURE: 119 MMHG | BODY MASS INDEX: 20.91 KG/M2

## 2024-07-01 DIAGNOSIS — J00 ACUTE RHINITIS: ICD-10-CM

## 2024-07-01 DIAGNOSIS — M79.604 LEG PAIN, BILATERAL: ICD-10-CM

## 2024-07-01 DIAGNOSIS — G62.9 NEUROPATHY: ICD-10-CM

## 2024-07-01 DIAGNOSIS — H72.92 TYMPANIC MEMBRANE PERFORATION, LEFT: Primary | ICD-10-CM

## 2024-07-01 DIAGNOSIS — M79.605 LEG PAIN, BILATERAL: ICD-10-CM

## 2024-07-01 PROCEDURE — 1159F MED LIST DOCD IN RCRD: CPT | Performed by: PHYSICIAN ASSISTANT

## 2024-07-01 PROCEDURE — 3078F DIAST BP <80 MM HG: CPT | Performed by: PHYSICIAN ASSISTANT

## 2024-07-01 PROCEDURE — 3074F SYST BP LT 130 MM HG: CPT | Performed by: PHYSICIAN ASSISTANT

## 2024-07-01 PROCEDURE — 99214 OFFICE O/P EST MOD 30 MIN: CPT | Performed by: PHYSICIAN ASSISTANT

## 2024-07-01 PROCEDURE — 3008F BODY MASS INDEX DOCD: CPT | Performed by: PHYSICIAN ASSISTANT

## 2024-07-01 PROCEDURE — 1126F AMNT PAIN NOTED NONE PRSNT: CPT | Performed by: PHYSICIAN ASSISTANT

## 2024-07-01 RX ORDER — GABAPENTIN 100 MG/1
100 CAPSULE ORAL NIGHTLY
Qty: 90 CAPSULE | Refills: 0 | Status: SHIPPED | OUTPATIENT
Start: 2024-07-01 | End: 2024-09-29

## 2024-07-01 RX ORDER — CETIRIZINE HYDROCHLORIDE 10 MG/1
10 TABLET ORAL DAILY
Qty: 90 TABLET | Refills: 0 | Status: SHIPPED | OUTPATIENT
Start: 2024-07-01

## 2024-07-01 NOTE — PROGRESS NOTES
HPI:     HPI  A 79-year-old female is in the office complaining of a discharge from her left ear 2 weeks ago after blowing her nose. The patient also feels heavy on her legs and feels tingling and numbness in both legs for the past couple of weeks.  The patient denies fever, ear pain, cough, sore throat, leg pain, or weakness.    Medications:     Current Outpatient Medications   Medication Sig Dispense Refill    gabapentin 100 MG Oral Cap Take 1 capsule (100 mg total) by mouth nightly. 90 capsule 0    mupirocin 2 % External Ointment Apply to affect lesions tid 30 g 0    cetirizine 10 MG Oral Tab Take 1 tablet (10 mg total) by mouth daily. 90 tablet 0    cholecalciferol 50 MCG (2000 UT) Oral Tab TAKE 1 TABLET BY MOUTH 1 TIME DAILY 90 tablet 3    lisinopril 10 MG Oral Tab Take 1 tablet (10 mg total) by mouth daily. 90 tablet 3    rosuvastatin 20 MG Oral Tab Take 1 tablet (20 mg total) by mouth nightly.      levothyroxine 75 MCG Oral Tab Take 1 tablet (75 mcg total) by mouth before breakfast. 90 tablet 3    aspirin 81 MG Oral Tab EC Take 1 tablet (81 mg total) by mouth daily.         Allergies:     Allergies   Allergen Reactions    Adhesive Tape RASH     ekg pads.    Codeine SWELLING    Triamazide [Hydrochlorothiazide W/Triamterene] OTHER (SEE COMMENTS)     Hypokalemia.    Dust Mite Extract OTHER (SEE COMMENTS)    Nystatin RASH and ITCHING       History:     Health Maintenance   Topic Date Due    Zoster Vaccines (1 of 2) Never done    Colorectal Cancer Screening  06/01/2023    COVID-19 Vaccine (6 - 2023-24 season) 09/01/2023    MA Annual Health Assessment  01/01/2024    Annual Depression Screening  01/01/2024    Fall Risk Screening (Annual)  01/01/2024    Influenza Vaccine (1) 10/01/2024    DEXA Scan  Completed    Pneumococcal Vaccine: 65+ Years  Completed    Mammogram  Discontinued       No LMP recorded. Patient has had a hysterectomy.   Past Medical History:     Past Medical History:    Age-related nuclear cataract  of both eyes    Asthma (HCC)    Disorder of thyroid    Floaters- per history, but none seen on exam    High blood pressure    High cholesterol    Hyperlipidemia    Hypothyroidism    PSC (posterior subcapsular cataract), right       Past Surgical History:     Past Surgical History:   Procedure Laterality Date    Appendectomy      Cataract extraction w/  intraocular lens implant Right 3/2/17    Dr. August @ [facility]    Cataract extraction w/  intraocular lens implant Left 17    Dr. August @ [facility]    Cholecystectomy      Colonoscopy  2016    Egd  2019    Hysterectomy         Family History:     Family History   Problem Relation Age of Onset    Lipids Other         family h/o hyperlipidemia    Heart Disorder Other         family h/o Vascular disease    Cancer Brother     Diabetes Neg     Glaucoma Neg     Macular degeneration Neg        Social History:     Social History     Socioeconomic History    Marital status:      Spouse name: Not on file    Number of children: Not on file    Years of education: Not on file    Highest education level: Not on file   Occupational History    Not on file   Tobacco Use    Smoking status: Former     Current packs/day: 0.00     Types: Cigarettes     Quit date:      Years since quittin.5    Smokeless tobacco: Never   Vaping Use    Vaping status: Never Used   Substance and Sexual Activity    Alcohol use: No    Drug use: No    Sexual activity: Not on file   Other Topics Concern     Service Not Asked    Blood Transfusions Not Asked    Caffeine Concern Yes     Comment: soda-1 cup/day    Occupational Exposure Not Asked    Hobby Hazards Not Asked    Sleep Concern Not Asked    Stress Concern Not Asked    Weight Concern Not Asked    Special Diet Not Asked    Back Care Not Asked    Exercise Not Asked    Bike Helmet Not Asked    Seat Belt Not Asked    Self-Exams Not Asked    Grew up on a farm Not Asked    History of tanning Not Asked    Outdoor occupation Not Asked    Pt has  a pacemaker No    Pt has a defibrillator No    Breast feeding Not Asked    Reaction to local anesthetic No   Social History Narrative    Not on file     Social Determinants of Health     Financial Resource Strain: Not on file   Food Insecurity: Not on file   Transportation Needs: Not on file   Physical Activity: Not on file   Stress: Not on file   Social Connections: Unknown (5/13/2021)    Received [facility], [facility]    Social Connections     Conversations with friends/family/neighbors per week: Not on file   Housing Stability: Not on file       Review of Systems:   Review of Systems   Constitutional:  Negative for activity change, chills, fatigue and fever.   HENT:  Positive for ear discharge. Negative for congestion, ear pain, postnasal drip, rhinorrhea, sinus pressure, sinus pain and sore throat.    Respiratory:  Negative for cough, chest tightness, shortness of breath and wheezing.    Cardiovascular:  Negative for chest pain and palpitations.   Gastrointestinal:  Negative for abdominal distention, abdominal pain, blood in stool, constipation, diarrhea, nausea and vomiting.   Skin:  Negative for rash.        Vitals:    07/01/24 1321   BP: 119/64   Pulse: 64   Weight: 118 lb (53.5 kg)   Height: 5' 3\" (1.6 m)     Body mass index is 20.9 kg/m².    Physical Exam:   Physical Exam  Vitals reviewed.   Constitutional:       General: She is not in acute distress.     Appearance: She is well-developed.   HENT:      Head: Normocephalic and atraumatic.      Right Ear: Tympanic membrane, ear canal and external ear normal. There is no impacted cerumen.      Left Ear: External ear normal. Tympanic membrane is perforated.      Nose: Nose normal.      Mouth/Throat:      Mouth: Mucous membranes are moist.      Pharynx: Oropharynx is clear. No oropharyngeal exudate or posterior oropharyngeal erythema.   Eyes:      General:         Right eye: No discharge.         Left eye: No  discharge.      Conjunctiva/sclera: Conjunctivae normal.   Cardiovascular:      Rate and Rhythm: Normal rate and regular rhythm.      Heart sounds: Normal heart sounds, S1 normal and S2 normal. No murmur heard.  Pulmonary:      Effort: Pulmonary effort is normal.      Breath sounds: Normal breath sounds. No wheezing or rales.   Chest:      Chest wall: No tenderness.   Feet:      Right foot:      Protective Sensation: 10 sites tested.  10 sites sensed.      Skin integrity: No erythema or dry skin.      Left foot:      Protective Sensation: 10 sites tested.  10 sites sensed.      Skin integrity: No erythema or dry skin.   Lymphadenopathy:      Cervical: No cervical adenopathy.   Skin:     Findings: No rash.   Neurological:      Mental Status: She is alert and oriented to person, place, and time.   Psychiatric:         Behavior: Behavior is cooperative.          Assessment and Plan::     Problem List Items Addressed This Visit    None  Visit Diagnoses       Tympanic membrane perforation, left    -  Primary    Relevant Medications        Other Relevant Orders    ENT Referral - [facility])    Leg pain, bilateral        Relevant Medications        Other Relevant Orders     ANKLE TOE BRACHIAL INDEX BILATERAL (CPT=93922)    XR STANDING LOWER EXTREMITY (CPT=77077)    Neuropathy        Relevant Medications    gabapentin 100 MG Oral Cap    Acute rhinitis        Relevant Medications    cetirizine 10 MG Oral Tab          Discussed plan of care with pt and pt is in agreement.All questions answered. Pt to call with questions or concerns.

## 2024-07-24 ENCOUNTER — LAB ENCOUNTER (OUTPATIENT)
Dept: LAB | Age: 79
End: 2024-07-24
Attending: PHYSICIAN ASSISTANT
Payer: MEDICARE

## 2024-07-24 ENCOUNTER — OFFICE VISIT (OUTPATIENT)
Dept: FAMILY MEDICINE CLINIC | Facility: CLINIC | Age: 79
End: 2024-07-24

## 2024-07-24 VITALS
SYSTOLIC BLOOD PRESSURE: 119 MMHG | HEART RATE: 59 BPM | HEIGHT: 63 IN | DIASTOLIC BLOOD PRESSURE: 69 MMHG | WEIGHT: 117 LBS | BODY MASS INDEX: 20.73 KG/M2

## 2024-07-24 DIAGNOSIS — R53.82 CHRONIC FATIGUE: ICD-10-CM

## 2024-07-24 DIAGNOSIS — R73.03 PREDIABETES: Primary | ICD-10-CM

## 2024-07-24 DIAGNOSIS — H93.13 TINNITUS OF BOTH EARS: ICD-10-CM

## 2024-07-24 DIAGNOSIS — R73.03 PREDIABETES: ICD-10-CM

## 2024-07-24 LAB
ANION GAP SERPL CALC-SCNC: 5 MMOL/L (ref 0–18)
BASOPHILS # BLD AUTO: 0.03 X10(3) UL (ref 0–0.2)
BASOPHILS NFR BLD AUTO: 0.5 %
BUN BLD-MCNC: 15 MG/DL (ref 9–23)
BUN/CREAT SERPL: 19 (ref 10–20)
CALCIUM BLD-MCNC: 9.9 MG/DL (ref 8.7–10.4)
CHLORIDE SERPL-SCNC: 107 MMOL/L (ref 98–112)
CK SERPL-CCNC: 80 U/L
CO2 SERPL-SCNC: 28 MMOL/L (ref 21–32)
CREAT BLD-MCNC: 0.79 MG/DL
DEPRECATED RDW RBC AUTO: 39.5 FL (ref 35.1–46.3)
EGFRCR SERPLBLD CKD-EPI 2021: 77 ML/MIN/1.73M2 (ref 60–?)
EOSINOPHIL # BLD AUTO: 0.14 X10(3) UL (ref 0–0.7)
EOSINOPHIL NFR BLD AUTO: 2.2 %
ERYTHROCYTE [DISTWIDTH] IN BLOOD BY AUTOMATED COUNT: 11.8 % (ref 11–15)
FASTING STATUS PATIENT QL REPORTED: NO
GLUCOSE BLD-MCNC: 92 MG/DL (ref 70–99)
HCT VFR BLD AUTO: 39.4 %
HCV AB SERPL QL IA: NONREACTIVE
HGB BLD-MCNC: 13.6 G/DL
IMM GRANULOCYTES # BLD AUTO: 0.01 X10(3) UL (ref 0–1)
IMM GRANULOCYTES NFR BLD: 0.2 %
LYMPHOCYTES # BLD AUTO: 1.92 X10(3) UL (ref 1–4)
LYMPHOCYTES NFR BLD AUTO: 29.8 %
MCH RBC QN AUTO: 31.6 PG (ref 26–34)
MCHC RBC AUTO-ENTMCNC: 34.5 G/DL (ref 31–37)
MCV RBC AUTO: 91.6 FL
MONOCYTES # BLD AUTO: 0.64 X10(3) UL (ref 0.1–1)
MONOCYTES NFR BLD AUTO: 9.9 %
NEUTROPHILS # BLD AUTO: 3.71 X10 (3) UL (ref 1.5–7.7)
NEUTROPHILS # BLD AUTO: 3.71 X10(3) UL (ref 1.5–7.7)
NEUTROPHILS NFR BLD AUTO: 57.4 %
OSMOLALITY SERPL CALC.SUM OF ELEC: 290 MOSM/KG (ref 275–295)
PLATELET # BLD AUTO: 231 10(3)UL (ref 150–450)
POTASSIUM SERPL-SCNC: 4.4 MMOL/L (ref 3.5–5.1)
RBC # BLD AUTO: 4.3 X10(6)UL
SODIUM SERPL-SCNC: 140 MMOL/L (ref 136–145)
WBC # BLD AUTO: 6.5 X10(3) UL (ref 4–11)

## 2024-07-24 PROCEDURE — 1159F MED LIST DOCD IN RCRD: CPT | Performed by: PHYSICIAN ASSISTANT

## 2024-07-24 PROCEDURE — 85025 COMPLETE CBC W/AUTO DIFF WBC: CPT

## 2024-07-24 PROCEDURE — 1126F AMNT PAIN NOTED NONE PRSNT: CPT | Performed by: PHYSICIAN ASSISTANT

## 2024-07-24 PROCEDURE — 86803 HEPATITIS C AB TEST: CPT

## 2024-07-24 PROCEDURE — 3074F SYST BP LT 130 MM HG: CPT | Performed by: PHYSICIAN ASSISTANT

## 2024-07-24 PROCEDURE — 3008F BODY MASS INDEX DOCD: CPT | Performed by: PHYSICIAN ASSISTANT

## 2024-07-24 PROCEDURE — 86480 TB TEST CELL IMMUN MEASURE: CPT

## 2024-07-24 PROCEDURE — 80048 BASIC METABOLIC PNL TOTAL CA: CPT

## 2024-07-24 PROCEDURE — 82550 ASSAY OF CK (CPK): CPT

## 2024-07-24 PROCEDURE — 36415 COLL VENOUS BLD VENIPUNCTURE: CPT

## 2024-07-24 PROCEDURE — 99213 OFFICE O/P EST LOW 20 MIN: CPT | Performed by: PHYSICIAN ASSISTANT

## 2024-07-24 PROCEDURE — 3078F DIAST BP <80 MM HG: CPT | Performed by: PHYSICIAN ASSISTANT

## 2024-07-24 NOTE — PROGRESS NOTES
HPI:     HPI  A 79-year-old female is in the office for a follow-up. The patient complains of numbness on the legs. US of leg showed more than 50% stenosis on the right leg.  The patient also feels fatigue all the time for the past month.    Medications:     Current Outpatient Medications   Medication Sig Dispense Refill    mupirocin 2 % External Ointment Apply to affect lesions tid 30 g 0    cetirizine 10 MG Oral Tab Take 1 tablet (10 mg total) by mouth daily. 90 tablet 0    cholecalciferol 50 MCG (2000 UT) Oral Tab TAKE 1 TABLET BY MOUTH 1 TIME DAILY 90 tablet 3    lisinopril 10 MG Oral Tab Take 1 tablet (10 mg total) by mouth daily. 90 tablet 3    rosuvastatin 20 MG Oral Tab Take 1 tablet (20 mg total) by mouth nightly.      levothyroxine 75 MCG Oral Tab Take 1 tablet (75 mcg total) by mouth before breakfast. 90 tablet 3    aspirin 81 MG Oral Tab EC Take 1 tablet (81 mg total) by mouth daily.      gabapentin 100 MG Oral Cap Take 1 capsule (100 mg total) by mouth nightly. (Patient not taking: Reported on 7/24/2024) 90 capsule 0       Allergies:     Allergies   Allergen Reactions    Adhesive Tape RASH     ekg pads.    Codeine SWELLING    Triamazide [Hydrochlorothiazide W/Triamterene] OTHER (SEE COMMENTS)     Hypokalemia.    Dust Mite Extract OTHER (SEE COMMENTS)    Nystatin RASH and ITCHING       History:     Health Maintenance   Topic Date Due    Zoster Vaccines (1 of 2) Never done    Colorectal Cancer Screening  06/01/2023    COVID-19 Vaccine (6 - 2023-24 season) 09/01/2023    MA Annual Health Assessment  01/01/2024    Annual Depression Screening  01/01/2024    Fall Risk Screening (Annual)  01/01/2024    Influenza Vaccine (1) 10/01/2024    DEXA Scan  Completed    Pneumococcal Vaccine: 65+ Years  Completed    Mammogram  Discontinued       No LMP recorded. Patient has had a hysterectomy.   Past Medical History:     Past Medical History:    Age-related nuclear cataract of both eyes    Asthma (HCC)    Disorder of  thyroid    Floaters- per history, but none seen on exam    High blood pressure    High cholesterol    Hyperlipidemia    Hypothyroidism    PSC (posterior subcapsular cataract), right       Past Surgical History:     Past Surgical History:   Procedure Laterality Date    Appendectomy      Cataract extraction w/  intraocular lens implant Right 3/2/17    Dr. August @ [facility]    Cataract extraction w/  intraocular lens implant Left 17    Dr. August @ [facility]    Cholecystectomy      Colonoscopy  2016    Egd  2019    Hysterectomy         Family History:     Family History   Problem Relation Age of Onset    Lipids Other         family h/o hyperlipidemia    Heart Disorder Other         family h/o Vascular disease    Cancer Brother     Diabetes Neg     Glaucoma Neg     Macular degeneration Neg        Social History:     Social History     Socioeconomic History    Marital status:      Spouse name: Not on file    Number of children: Not on file    Years of education: Not on file    Highest education level: Not on file   Occupational History    Not on file   Tobacco Use    Smoking status: Former     Current packs/day: 0.00     Types: Cigarettes     Quit date:      Years since quittin.5    Smokeless tobacco: Never   Vaping Use    Vaping status: Never Used   Substance and Sexual Activity    Alcohol use: No    Drug use: No    Sexual activity: Not on file   Other Topics Concern     Service Not Asked    Blood Transfusions Not Asked    Caffeine Concern Yes     Comment: soda-1 cup/day    Occupational Exposure Not Asked    Hobby Hazards Not Asked    Sleep Concern Not Asked    Stress Concern Not Asked    Weight Concern Not Asked    Special Diet Not Asked    Back Care Not Asked    Exercise Not Asked    Bike Helmet Not Asked    Seat Belt Not Asked    Self-Exams Not Asked    Grew up on a farm Not Asked    History of tanning Not Asked    Outdoor occupation Not Asked    Pt has a pacemaker No    Pt has a defibrillator No     Breast feeding Not Asked    Reaction to local anesthetic No   Social History Narrative    Not on file     Social Determinants of Health     Financial Resource Strain: Not on file   Food Insecurity: Not on file   Transportation Needs: Not on file   Physical Activity: Not on file   Stress: Not on file   Social Connections: Unknown (5/13/2021)    Received from [facility], [facility]    Social Connections     Conversations with friends/family/neighbors per week: Not on file   Housing Stability: Not on file       Review of Systems:   Review of Systems   Constitutional:  Positive for fatigue.   Neurological:  Positive for numbness.        Vitals:    07/24/24 1049   BP: 119/69   Pulse: 59   Weight: 117 lb (53.1 kg)   Height: 5' 3\" (1.6 m)     Body mass index is 20.73 kg/m².    Physical Exam:   Physical Exam  Vitals reviewed.   Cardiovascular:      Rate and Rhythm: Normal rate and regular rhythm.      Heart sounds: Normal heart sounds.   Pulmonary:      Effort: Pulmonary effort is normal.      Breath sounds: Normal breath sounds.      Patient alert and orient x3, able to carry on conversation easily, without shortness of breath, coughing, can speak in full sentences.      Assessment and Plan::     Problem List Items Addressed This Visit    None  Visit Diagnoses       Prediabetes    -  Primary    Relevant Orders    Basic Metabolic Panel (8)    Chronic fatigue        Relevant Orders    Quantiferon TB Plus    CK (Creatine Kinase) (Not Creatinine) (E)    HCV Antibody [E]    CBC With Differential With Platelet    Tinnitus of both ears        Relevant Orders    ENT Referral - [place] ([facility])          Discussed plan of care with pt and pt is in agreement.All questions answered. Pt to call with questions or concerns.

## 2024-07-26 LAB
M TB IFN-G CD4+ T-CELLS BLD-ACNC: 0 IU/ML
M TB TUBERC IFN-G BLD QL: NEGATIVE
M TB TUBERC IGNF/MITOGEN IGNF CONTROL: >10 IU/ML
QFT TB1 AG MINUS NIL: 0 IU/ML
QFT TB2 AG MINUS NIL: 0 IU/ML

## 2024-08-05 ENCOUNTER — HOSPITAL ENCOUNTER (OUTPATIENT)
Dept: ULTRASOUND IMAGING | Facility: HOSPITAL | Age: 79
Discharge: HOME | End: 2024-08-05
Attending: PHYSICIAN ASSISTANT
Payer: MEDICARE

## 2024-08-05 ENCOUNTER — HOSPITAL ENCOUNTER (OUTPATIENT)
Dept: GENERAL RADIOLOGY | Facility: HOSPITAL | Age: 79
Discharge: HOME | End: 2024-08-05
Attending: PHYSICIAN ASSISTANT
Payer: MEDICARE

## 2024-08-05 ENCOUNTER — HOSPITAL ENCOUNTER (OUTPATIENT)
Dept: MAMMOGRAPHY | Facility: HOSPITAL | Age: 79
Discharge: HOME | End: 2024-08-05
Attending: PHYSICIAN ASSISTANT
Payer: MEDICARE

## 2024-08-05 DIAGNOSIS — M79.604 LEG PAIN, BILATERAL: ICD-10-CM

## 2024-08-05 DIAGNOSIS — M79.605 LEG PAIN, BILATERAL: ICD-10-CM

## 2024-08-05 DIAGNOSIS — R92.8 ABNORMAL MAMMOGRAM: ICD-10-CM

## 2024-08-05 PROCEDURE — 93922 UPR/L XTREMITY ART 2 LEVELS: CPT | Performed by: PHYSICIAN ASSISTANT

## 2024-08-05 PROCEDURE — 77066 DX MAMMO INCL CAD BI: CPT | Performed by: PHYSICIAN ASSISTANT

## 2024-08-05 PROCEDURE — 77062 BREAST TOMOSYNTHESIS BI: CPT | Performed by: PHYSICIAN ASSISTANT

## 2024-08-05 PROCEDURE — 77077 JOINT SURVEY SINGLE VIEW: CPT | Performed by: PHYSICIAN ASSISTANT

## 2024-08-12 ENCOUNTER — TELEPHONE (OUTPATIENT)
Dept: FAMILY MEDICINE CLINIC | Facility: CLINIC | Age: 79
End: 2024-08-12

## 2024-08-12 DIAGNOSIS — I73.9 PAD (PERIPHERAL ARTERY DISEASE) (HCC): ICD-10-CM

## 2024-08-12 DIAGNOSIS — R53.82 CHRONIC FATIGUE: Primary | ICD-10-CM

## 2024-08-12 NOTE — TELEPHONE ENCOUNTER
Patient and her daughter called to follow up on office visit 7/24/24   Patient is still experiencing fatigue and her legs feel cold . Daughter states her mom is normally very active. Daughter asking for next steps. Please advise.    When calling back, please call patient's daughter Tamia.

## 2024-08-12 NOTE — TELEPHONE ENCOUNTER
Spoke with patient's daughter ( & Name verified).  Discussed with patient's daughter regarding the symptoms. Refer to Rheumatologist for chronic fatigue and vascular surgeon for PAD.  Patient's daughter verbalized understanding.

## 2024-08-19 ENCOUNTER — OFFICE VISIT (OUTPATIENT)
Dept: FAMILY MEDICINE CLINIC | Facility: CLINIC | Age: 79
End: 2024-08-19

## 2024-08-19 VITALS
BODY MASS INDEX: 21.09 KG/M2 | SYSTOLIC BLOOD PRESSURE: 137 MMHG | WEIGHT: 119 LBS | HEIGHT: 63 IN | DIASTOLIC BLOOD PRESSURE: 71 MMHG | HEART RATE: 68 BPM

## 2024-08-19 DIAGNOSIS — B34.9 ACUTE VIRAL SYNDROME: Primary | ICD-10-CM

## 2024-08-19 PROBLEM — J41.0 SMOKERS' COUGH (HCC): Chronic | Status: ACTIVE | Noted: 2024-08-19

## 2024-08-19 RX ORDER — BENZONATATE 200 MG/1
200 CAPSULE ORAL 3 TIMES DAILY PRN
Qty: 30 CAPSULE | Refills: 0 | Status: SHIPPED | OUTPATIENT
Start: 2024-08-19

## 2024-08-19 NOTE — PROGRESS NOTES
2024  5:13 PM    Mary Robledo is a 79 year old female.    Chief complaint(s):   Chief Complaint   Patient presents with    Cough     Started Thursday , Weakness      HPI:     Mary Robledo primary complaint is regarding cough.     Patient is a 79-year-old female with history of hypertension who presents complaining of cough for the past 5 days.  It is associated with fatigue Adan and tiredness and weakness.  Also associated with headaches, body aches, diarrhea and feeling complete body numbness.  There has been no sore throat, ear pain, vomiting.  Her cough is dry it and at times associated with chest pain.  Her daughter was recently diagnosed with flu.      HISTORY:  Past Medical History:    Age-related nuclear cataract of both eyes    Asthma (HCC)    Disorder of thyroid    Floaters- per history, but none seen on exam    High blood pressure    High cholesterol    Hyperlipidemia    Hypothyroidism    PSC (posterior subcapsular cataract), right      Past Surgical History:   Procedure Laterality Date    Appendectomy      Cataract extraction w/  intraocular lens implant Right 3/2/17    Dr. August @ [facility]    Cataract extraction w/  intraocular lens implant Left 17    Dr. August @ [facility]    Cholecystectomy      Colonoscopy  2016    Egd  2019    Hysterectomy        Family History   Problem Relation Age of Onset    Lipids Other         family h/o hyperlipidemia    Heart Disorder Other         family h/o Vascular disease    Cancer Brother     Diabetes Neg     Glaucoma Neg     Macular degeneration Neg       Social History:   Social History     Socioeconomic History    Marital status:    Tobacco Use    Smoking status: Former     Current packs/day: 0.00     Types: Cigarettes     Quit date:      Years since quittin.6    Smokeless tobacco: Never   Vaping Use    Vaping status: Never Used   Substance and Sexual Activity    Alcohol use: No    Drug use: No   Other Topics Concern    Caffeine Concern Yes      Comment: soda-1 cup/day    Pt has a pacemaker No    Pt has a defibrillator No    Reaction to local anesthetic No     Social Determinants of Health      [facility]    Social Connections        Immunizations:   Immunization History   Administered Date(s) Administered    Covid-19 Vaccine Pfizer 30 mcg/0.3 ml 02/05/2021, 02/26/2021, 11/04/2021    Covid-19 Vaccine Pfizer Bivalent 30mcg/0.3mL 09/14/2022    Covid-19 Vaccine Pfizer Chang-Sucrose 30 mcg/0.3 ml 06/07/2022    FLU VAC High Dose 65 YRS & Older PRSV Free (59335) 09/26/2019, 09/16/2020, 11/16/2021, 11/26/2022    Fluzone Vaccine Medicare () 10/31/2016, 09/26/2019, 09/16/2020    Influenza 12/01/2023    Pneumococcal (Prevnar 13) 04/05/2016    Pneumovax 23 04/11/2017    TDAP 11/23/2017       Medications (Active prior to today's visit):  Current Outpatient Medications   Medication Sig Dispense Refill    benzonatate 200 MG Oral Cap Take 1 capsule (200 mg total) by mouth 3 (three) times daily as needed. 30 capsule 0    lisinopril 10 MG Oral Tab Take 1 tablet (10 mg total) by mouth daily. 90 tablet 3    rosuvastatin 20 MG Oral Tab Take 1 tablet (20 mg total) by mouth nightly.      levothyroxine 75 MCG Oral Tab Take 1 tablet (75 mcg total) by mouth before breakfast. 90 tablet 3    aspirin 81 MG Oral Tab EC Take 1 tablet (81 mg total) by mouth daily.      gabapentin 100 MG Oral Cap Take 1 capsule (100 mg total) by mouth nightly. (Patient not taking: Reported on 7/24/2024) 90 capsule 0    mupirocin 2 % External Ointment Apply to affect lesions tid (Patient not taking: Reported on 8/19/2024) 30 g 0    cetirizine 10 MG Oral Tab Take 1 tablet (10 mg total) by mouth daily. (Patient not taking: Reported on 8/19/2024) 90 tablet 0    cholecalciferol 50 MCG (2000 UT) Oral Tab TAKE 1 TABLET BY MOUTH 1 TIME DAILY (Patient not taking: Reported on 8/19/2024) 90 tablet 3       Allergies:  Allergies   Allergen Reactions     Adhesive Tape RASH     ekg pads.    Codeine SWELLING    Triamazide [Hydrochlorothiazide W/Triamterene] OTHER (SEE COMMENTS)     Hypokalemia.    Dust Mite Extract OTHER (SEE COMMENTS)    Nystatin RASH and ITCHING         ROS:   Review of Systems   Constitutional:  Positive for fatigue. Negative for appetite change and fever.   HENT:  Negative for congestion and sore throat.         Normal smell and taste   Eyes:  Negative for visual disturbance.   Respiratory:  Positive for cough and shortness of breath.    Cardiovascular:  Positive for chest pain.   Gastrointestinal:  Positive for diarrhea. Negative for abdominal pain, nausea and vomiting.   Musculoskeletal:  Positive for myalgias. Negative for back pain.   Skin:  Negative for rash.   Neurological:  Positive for numbness (\"whole body\") and headaches. Negative for dizziness.       PHYSICAL EXAM:   VS: /71   Pulse 68   Ht 5' 3\" (1.6 m)   Wt 119 lb (54 kg)   BMI 21.08 kg/m²     Physical Exam  Vitals reviewed.   Constitutional:       General: She is not in acute distress.     Appearance: Normal appearance.   HENT:      Head: Normocephalic.      Right Ear: Tympanic membrane and ear canal normal.      Left Ear: Tympanic membrane and ear canal normal.      Nose: Nose normal.      Mouth/Throat:      Pharynx: Oropharynx is clear.   Eyes:      Conjunctiva/sclera: Conjunctivae normal.   Cardiovascular:      Rate and Rhythm: Normal rate and regular rhythm.      Heart sounds: Murmur heard.      Systolic murmur is present with a grade of 2/6.   Pulmonary:      Effort: Pulmonary effort is normal.      Breath sounds: Normal breath sounds.   Musculoskeletal:      Cervical back: Neck supple.   Skin:     Findings: No rash.   Psychiatric:         Mood and Affect: Mood normal.         LABORATORY RESULTS:     EKG / Spirometry : -     Radiology:     ASSESSMENT/PLAN:   Assessment   Encounter Diagnosis   Name Primary?    Acute viral syndrome Yes       MEDICATIONS:     Requested  Prescriptions     Signed Prescriptions Disp Refills    benzonatate 200 MG Oral Cap 30 capsule 0     Sig: Take 1 capsule (200 mg total) by mouth 3 (three) times daily as needed.           LABORATORY & ORDERS:   Orders Placed This Encounter   Procedures    SARS-CoV-2/Flu A and B/RSV by PCR ([facility])        RECOMMENDATIONS given include: Patient was reassured of  her medical condition and all questions and concerns were answered. Patient was informed to please, call our office with any new or further questions or concerns that may come up in the near future. Notify Dr Block or the [facility] if there is a deterioration or worsening of the medical condition. Also, inform the doctor with any new symptoms or medications' side effects.      FOLLOW-UP: Schedule a follow-up visit in  prn.            Orders This Visit:  Orders Placed This Encounter   Procedures    SARS-CoV-2/Flu A and B/RSV by PCR ([facility])       Meds This Visit:  Requested Prescriptions     Signed Prescriptions Disp Refills    benzonatate 200 MG Oral Cap 30 capsule 0     Sig: Take 1 capsule (200 mg total) by mouth 3 (three) times daily as needed.       Imaging & Referrals:  None         MARICRUZ BLOCK MD

## 2024-08-20 ENCOUNTER — LAB ENCOUNTER (OUTPATIENT)
Dept: LAB | Age: 79
End: 2024-08-20
Attending: FAMILY MEDICINE
Payer: MEDICARE

## 2024-08-20 DIAGNOSIS — B34.9 ACUTE VIRAL SYNDROME: ICD-10-CM

## 2024-08-20 PROCEDURE — 87637 SARSCOV2&INF A&B&RSV AMP PRB: CPT

## 2024-08-21 LAB
FLUAV + FLUBV RNA SPEC NAA+PROBE: NOT DETECTED
FLUAV + FLUBV RNA SPEC NAA+PROBE: NOT DETECTED
RSV RNA SPEC NAA+PROBE: NOT DETECTED
SARS-COV-2 RNA RESP QL NAA+PROBE: DETECTED

## 2024-08-27 ENCOUNTER — TELEPHONE (OUTPATIENT)
Dept: FAMILY MEDICINE CLINIC | Facility: CLINIC | Age: 79
End: 2024-08-27

## 2024-08-27 NOTE — TELEPHONE ENCOUNTER
Pt's daughter called asking if pt need to be retested for COVID , advised no, pt is also feeling better, verbalized understanding

## 2024-09-12 ENCOUNTER — OFFICE VISIT (OUTPATIENT)
Facility: CLINIC | Age: 79
End: 2024-09-12
Payer: MEDICARE

## 2024-09-12 VITALS — WEIGHT: 119 LBS | RESPIRATION RATE: 18 BRPM | HEIGHT: 63 IN | BODY MASS INDEX: 21.09 KG/M2

## 2024-09-12 DIAGNOSIS — G62.9 NEUROPATHY: Primary | ICD-10-CM

## 2024-09-14 NOTE — PROGRESS NOTES
Samer F. Najjar, MD  Vascular Surgery  [facility]      VASCULAR SURGERY   CLINIC CONSULT NOTE        Name: Mary Robledo   :   1945  [id number]     REFERRING PHYSICIAN:  Mary Suh  PRIMARY CARE PHYSICIAN:  Daiana Marrero MD    HISTORY OF PRESENT ILLNESS:   Patient is a 79 year old female who has been referred regarding assessment of her RLE. She describes right numbness and pain affecting her right lower extremity from the knee down to the foot.  She describes falling followed by inability to talk and then throwing up.  This happened about a year ago.  She also notes significant fatigue which she never had before.  The patient states that she normally is very active and enjoys walking and denies any claudication pain while walking.  She was seen previously by Dr. Llanes about a year ago who felt that perhaps the symptoms could be a venous reflux ultrasound was ordered but the patient never obtained it.  Patient did undergo arterial flow testing previously and this revealed normal ABIs bilaterally.  The patient is scheduled to see a rheumatologist.    PAST MEDICAL HISTORY:    Past Medical History:    Age-related nuclear cataract of both eyes    Asthma (HCC)    Disorder of thyroid    Floaters- per history, but none seen on exam    High blood pressure    High cholesterol    Hyperlipidemia    Hypothyroidism    PSC (posterior subcapsular cataract), right       PAST SURGICAL HISTORY:   Past Surgical History:   Procedure Laterality Date    Appendectomy      Cataract extraction w/  intraocular lens implant Right 3/2/17    Dr. August @ [facility]    Cataract extraction w/  intraocular lens implant Left 17    Dr. August @ [facility]    Cholecystectomy      Colonoscopy  2016    Egd  2019    Hysterectomy          MEDICATIONS:     Current Outpatient Medications:     benzonatate 200 MG Oral Cap, Take 1 capsule (200 mg total) by mouth 3 (three) times daily as needed., Disp: 30 capsule, Rfl: 0     gabapentin 100 MG Oral Cap, Take 1 capsule (100 mg total) by mouth nightly., Disp: 90 capsule, Rfl: 0    mupirocin 2 % External Ointment, Apply to affect lesions tid, Disp: 30 g, Rfl: 0    cetirizine 10 MG Oral Tab, Take 1 tablet (10 mg total) by mouth daily., Disp: 90 tablet, Rfl: 0    cholecalciferol 50 MCG (2000 UT) Oral Tab, TAKE 1 TABLET BY MOUTH 1 TIME DAILY, Disp: 90 tablet, Rfl: 3    lisinopril 10 MG Oral Tab, Take 1 tablet (10 mg total) by mouth daily., Disp: 90 tablet, Rfl: 3    rosuvastatin 20 MG Oral Tab, Take 1 tablet (20 mg total) by mouth nightly., Disp: , Rfl:     levothyroxine 75 MCG Oral Tab, Take 1 tablet (75 mcg total) by mouth before breakfast., Disp: 90 tablet, Rfl: 3    aspirin 81 MG Oral Tab EC, Take 1 tablet (81 mg total) by mouth daily., Disp: , Rfl:     ALLERGIES:    She is allergic to adhesive tape, codeine, triamazide [hydrochlorothiazide w/triamterene], dust mite extract, and nystatin.    SOCIAL HISTORY:    Patient  reports that she quit smoking about 37 years ago. Her smoking use included cigarettes. She has never used smokeless tobacco. She reports that she does not drink alcohol and does not use drugs.    FAMILY HISTORY:    Patient's family history includes Cancer in her brother; Heart Disorder in an other family member; Lipids in an other family member.    ROS:     A 12 point review of systems with pertinent positives and negatives listed in the HPI.    EXAM:    Resp 18   Ht 5' 3\" (1.6 m)   Wt 119 lb (54 kg)   BMI 21.08 kg/m²   GENERAL: alert and orientated X 3, well developed, well nourished, in no apparent distress  PSYCH: normal mood and affect  HEENT: ears and throat are clear  NECK: supple, no lymphadenopathy, thyroid wnl  CAROTID: no bruits  RESPIRATORY: no rales, rhonchi, or wheezes B  CARDIO: RRR without murmur, no murmur, no gallop   ABDOMEN: soft, non-tender with no palpable aneurysm or masses  BACK: normal, no tenderness  SKIN: no rashes, warm and dry  EXTREMITIES: no  tenderness  NEURO: no sensory or motor deficits  VASCULAR:      Femoral Popliteal DP PT Peroneal   Right 2+       palpable, weak palpable, weak    Left 2+       palpable, weak palpable, weak        LABS:   Lab Results   Component Value Date     05/08/2024    A1C 5.7 (H) 05/08/2024      Lab Results   Component Value Date    GLU 92 07/24/2024    BUN 15 07/24/2024    CREATSERUM 0.79 07/24/2024    BUNCREA 19.0 07/24/2024    ANIONGAP 5 07/24/2024    GFRAA 91 11/22/2021    GFRNAA 79 11/22/2021    CA 9.9 07/24/2024     07/24/2024    K 4.4 07/24/2024     07/24/2024    CO2 28.0 07/24/2024    OSMOCALC 290 07/24/2024      Lab Results   Component Value Date    WBC 6.5 07/24/2024    RBC 4.30 07/24/2024    HGB 13.6 07/24/2024    HCT 39.4 07/24/2024    MCV 91.6 07/24/2024    MCH 31.6 07/24/2024    MCHC 34.5 07/24/2024    RDW 11.8 07/24/2024    .0 07/24/2024    MPV 7.8 10/20/2017        Lab Results   Component Value Date    HGB 13.6 07/24/2024    HGB 14.0 11/08/2023    HGB 12.9 11/26/2022    HGB 13.1 08/26/2022    HGB 14.5 11/22/2021    HGB 13.8 06/05/2021    CREATSERUM 0.79 07/24/2024    CREATSERUM 0.82 05/08/2024    CREATSERUM 0.79 11/08/2023    CREATSERUM 0.65 11/26/2022    CREATSERUM 0.73 08/26/2022    CREATSERUM 0.75 11/22/2021         ASSESSMENT/PLAN:  I have reviewed the patient's prior documentation and studies from [facility] and from my chart.  Diagnoses and all orders for this visit:    Neuropathy    I reassured the patient that I do not believe she has peripheral arterial disease.  I explained to the patient that I suspect that she may have an autoimmune disease that could be causing her neuropathy affecting both legs.  She does not have evidence of significant peripheral arterial disease that would warrant further investigation.  I can feel her pulses.  I do not believe that her symptoms are consistent with venous insufficiency and therefore I would not recommend obtaining a venous reflux  ultrasound.  I encouraged her to keep her appointment with the her rheumatologist so that she can undergo a proper workup but she may also require same-day neurologist for EMG testing and workup for her neuropathy.    The patient indicated an understanding of these issues and agreed to the plan and all questions were answered during the clinic visit.      Thank you for allowing me to participate in your patient's care.   Please do not hesitate to contact me with any questions.    Sincerely,  Samer F. Najjar, MD    Please note: Dragon speech recognition software was used to prepare this note. If a word or phrase is confusing, it is likely do to a failure of recognition.   Please contact me with any questions or clarifications.

## 2024-11-11 RX ORDER — ROSUVASTATIN CALCIUM 20 MG/1
20 TABLET, COATED ORAL DAILY
Qty: 90 TABLET | Refills: 0 | Status: SHIPPED | OUTPATIENT
Start: 2024-11-11

## 2024-11-11 NOTE — TELEPHONE ENCOUNTER
Refill passed per [facility] protocol.    Medication is listed as patient reported.- last written by cardiology    Last appointment with cardiology was over 1 year ago.- please advise if refill is appropriate.     Requested Prescriptions   Pending Prescriptions Disp Refills    ROSUVASTATIN 20 MG Oral Tab [Pharmacy Med Name: Rosuvastatin Calcium 20 Mg Tab Nort] 90 tablet 0     Sig: TAKE ONE TABLET BY MOUTH ONE TIME DAILY       Cholesterol Medication Protocol Passed - 11/11/2024  5:41 PM        Passed - ALT < 80     Lab Results   Component Value Date    ALT 11 05/08/2024             Passed - ALT resulted within past year        Passed - Lipid panel within past 12 months     Lab Results   Component Value Date    CHOLEST 159 05/08/2024    TRIG 157 (H) 05/08/2024    HDL 55 05/08/2024    LDL 77 05/08/2024    VLDL 24 05/08/2024    NONHDLC 104 05/08/2024             Passed - In person appointment or virtual visit in the past 12 mos or appointment in next 3 mos     Recent Outpatient Visits              2 months ago Neuropathy    [facility] Najjar, Samer F, MD    Office Visit    2 months ago Acute viral syndrome    [facility], Brad Esquivel MD    Office Visit    3 months ago Prediabetes    [facility], [place] Nguyen, Minhxuyen, PA-C    Office Visit    4 months ago Tympanic membrane perforation, left    [facility], [place], Minhxuyen, PA-C    Office Visit    6 months ago Prediabetes    [facility] [place] Nguyen, Minhxuyen, PA-C    Office Visit          Future Appointments         Provider Department Appt Notes    In 2 days Rocío Cruz MD [facility] chronic fatigue syndrome                       Future Appointments         Provider Department Appt Notes    In 2 days Nancy  MD Rocío [facility], [place] chronic fatigue syndrome          Recent Outpatient Visits              2 months ago Neuropathy    [facility], [place] Najjar, Samer F, MD    Office Visit    2 months ago Acute viral syndrome    [facility], Brad Esquivel MD    Office Visit    3 months ago Prediabetes    [facility] [place]-C    Office Visit    4 months ago Tympanic membrane perforation, left    [facility], [place] Nguyen, Minhxuyen, PA-C    Office Visit    6 months ago Prediabetes    [facility] [place] Nguyen, Minhxuyen, PA-C    Office Visit

## 2024-11-13 ENCOUNTER — HOSPITAL ENCOUNTER (OUTPATIENT)
Dept: GENERAL RADIOLOGY | Facility: HOSPITAL | Age: 79
Discharge: HOME | End: 2024-11-13
Attending: INTERNAL MEDICINE
Payer: MEDICARE

## 2024-11-13 ENCOUNTER — LAB ENCOUNTER (OUTPATIENT)
Dept: LAB | Facility: HOSPITAL | Age: 79
End: 2024-11-13
Attending: INTERNAL MEDICINE
Payer: MEDICARE

## 2024-11-13 ENCOUNTER — OFFICE VISIT (OUTPATIENT)
Dept: RHEUMATOLOGY | Facility: CLINIC | Age: 79
End: 2024-11-13
Payer: MEDICARE

## 2024-11-13 VITALS
RESPIRATION RATE: 16 BRPM | BODY MASS INDEX: 21 KG/M2 | SYSTOLIC BLOOD PRESSURE: 129 MMHG | WEIGHT: 118.5 LBS | DIASTOLIC BLOOD PRESSURE: 69 MMHG | HEIGHT: 63 IN | HEART RATE: 65 BPM

## 2024-11-13 DIAGNOSIS — R35.0 FREQUENCY OF URINATION: ICD-10-CM

## 2024-11-13 DIAGNOSIS — M79.10 MYALGIA: ICD-10-CM

## 2024-11-13 DIAGNOSIS — G93.31 POST VIRAL SYNDROME: ICD-10-CM

## 2024-11-13 DIAGNOSIS — R53.82 CHRONIC FATIGUE: Primary | ICD-10-CM

## 2024-11-13 DIAGNOSIS — R20.2 NUMBNESS AND TINGLING: ICD-10-CM

## 2024-11-13 DIAGNOSIS — R53.82 CHRONIC FATIGUE: ICD-10-CM

## 2024-11-13 DIAGNOSIS — R20.0 NUMBNESS AND TINGLING: ICD-10-CM

## 2024-11-13 LAB
BILIRUB UR QL: NEGATIVE
CK SERPL-CCNC: 71 U/L
CLARITY UR: CLEAR
CRP SERPL-MCNC: <0.4 MG/DL (ref ?–1)
DEPRECATED RDW RBC AUTO: 41.5 FL (ref 35.1–46.3)
ERYTHROCYTE [DISTWIDTH] IN BLOOD BY AUTOMATED COUNT: 12 % (ref 11–15)
ERYTHROCYTE [SEDIMENTATION RATE] IN BLOOD: 11 MM/HR
FOLATE SERPL-MCNC: 17.3 NG/ML (ref 5.4–?)
GLUCOSE UR-MCNC: NORMAL MG/DL
HCT VFR BLD AUTO: 40.5 %
HGB BLD-MCNC: 14.1 G/DL
KETONES UR-MCNC: NEGATIVE MG/DL
LEUKOCYTE ESTERASE UR QL STRIP.AUTO: NEGATIVE
MCH RBC QN AUTO: 32.6 PG (ref 26–34)
MCHC RBC AUTO-ENTMCNC: 34.8 G/DL (ref 31–37)
MCV RBC AUTO: 93.5 FL
NITRITE UR QL STRIP.AUTO: NEGATIVE
PH UR: 7.5 [PH] (ref 5–8)
PLATELET # BLD AUTO: 231 10(3)UL (ref 150–450)
PROT UR-MCNC: NEGATIVE MG/DL
RBC # BLD AUTO: 4.33 X10(6)UL
RHEUMATOID FACT SERPL-ACNC: 9.7 IU/ML (ref ?–14)
SP GR UR STRIP: 1.01 (ref 1–1.03)
TSI SER-ACNC: 0.11 UIU/ML (ref 0.55–4.78)
UROBILINOGEN UR STRIP-ACNC: NORMAL
VIT B12 SERPL-MCNC: 388 PG/ML (ref 211–911)
WBC # BLD AUTO: 4.8 X10(3) UL (ref 4–11)

## 2024-11-13 PROCEDURE — 82550 ASSAY OF CK (CPK): CPT

## 2024-11-13 PROCEDURE — 86038 ANTINUCLEAR ANTIBODIES: CPT

## 2024-11-13 PROCEDURE — 1160F RVW MEDS BY RX/DR IN RCRD: CPT | Performed by: INTERNAL MEDICINE

## 2024-11-13 PROCEDURE — 85027 COMPLETE CBC AUTOMATED: CPT

## 2024-11-13 PROCEDURE — 1126F AMNT PAIN NOTED NONE PRSNT: CPT | Performed by: INTERNAL MEDICINE

## 2024-11-13 PROCEDURE — 71046 X-RAY EXAM CHEST 2 VIEWS: CPT | Performed by: INTERNAL MEDICINE

## 2024-11-13 PROCEDURE — 99204 OFFICE O/P NEW MOD 45 MIN: CPT | Performed by: INTERNAL MEDICINE

## 2024-11-13 PROCEDURE — 85652 RBC SED RATE AUTOMATED: CPT

## 2024-11-13 PROCEDURE — 82085 ASSAY OF ALDOLASE: CPT

## 2024-11-13 PROCEDURE — 3008F BODY MASS INDEX DOCD: CPT | Performed by: INTERNAL MEDICINE

## 2024-11-13 PROCEDURE — 86364 TISS TRNSGLTMNASE EA IG CLAS: CPT

## 2024-11-13 PROCEDURE — 86225 DNA ANTIBODY NATIVE: CPT

## 2024-11-13 PROCEDURE — 86334 IMMUNOFIX E-PHORESIS SERUM: CPT | Performed by: INTERNAL MEDICINE

## 2024-11-13 PROCEDURE — 86618 LYME DISEASE ANTIBODY: CPT

## 2024-11-13 PROCEDURE — 81001 URINALYSIS AUTO W/SCOPE: CPT

## 2024-11-13 PROCEDURE — 86037 ANCA TITER EACH ANTIBODY: CPT

## 2024-11-13 PROCEDURE — 83521 IG LIGHT CHAINS FREE EACH: CPT | Performed by: INTERNAL MEDICINE

## 2024-11-13 PROCEDURE — 3074F SYST BP LT 130 MM HG: CPT | Performed by: INTERNAL MEDICINE

## 2024-11-13 PROCEDURE — 83516 IMMUNOASSAY NONANTIBODY: CPT

## 2024-11-13 PROCEDURE — 82595 ASSAY OF CRYOGLOBULIN: CPT

## 2024-11-13 PROCEDURE — 86039 ANTINUCLEAR ANTIBODIES (ANA): CPT

## 2024-11-13 PROCEDURE — 86431 RHEUMATOID FACTOR QUANT: CPT

## 2024-11-13 PROCEDURE — 84443 ASSAY THYROID STIM HORMONE: CPT

## 2024-11-13 PROCEDURE — 86747 PARVOVIRUS ANTIBODY: CPT

## 2024-11-13 PROCEDURE — 86200 CCP ANTIBODY: CPT

## 2024-11-13 PROCEDURE — 82746 ASSAY OF FOLIC ACID SERUM: CPT

## 2024-11-13 PROCEDURE — 36415 COLL VENOUS BLD VENIPUNCTURE: CPT

## 2024-11-13 PROCEDURE — 86140 C-REACTIVE PROTEIN: CPT

## 2024-11-13 PROCEDURE — 82607 VITAMIN B-12: CPT

## 2024-11-13 PROCEDURE — 3078F DIAST BP <80 MM HG: CPT | Performed by: INTERNAL MEDICINE

## 2024-11-13 PROCEDURE — 84165 PROTEIN E-PHORESIS SERUM: CPT | Performed by: INTERNAL MEDICINE

## 2024-11-13 PROCEDURE — 1159F MED LIST DOCD IN RCRD: CPT | Performed by: INTERNAL MEDICINE

## 2024-11-13 RX ORDER — GABAPENTIN 100 MG/1
100 CAPSULE ORAL NIGHTLY
Qty: 30 CAPSULE | Refills: 2 | Status: SHIPPED | OUTPATIENT
Start: 2024-11-13

## 2024-11-13 NOTE — PATIENT INSTRUCTIONS
Check labs   Check chest xray   Try gabapentin 100mg at night   Check emg/ncv studies - for checking numbness and tingling. = call and schedule at [phone]   Return to clinic in 1 month. 12/9 at 1:30 pm

## 2024-11-13 NOTE — PROGRESS NOTES
Mary Robledo is a 79 year old female who presents for   Chief Complaint   Patient presents with    Consult     Dx: Chronic fatigue     Joint Pain    Stiffness    Numbness     B/L Hands    .   HPI:     I had the pleasure of seeing Mary Robledo on 11/13/2024 for evaluation.     She is a pleasant 79 year old with asthma, hypothryoidism, gastroparesis s/p gastric outlet  surgery in 3/2023, and asthma who is very active but for the last 4 months she feels weak and she walks daily. She is with her daughter. She was referred by Dr. Marrero.   She was getting numbness all over. She feels only when she sleeps she doesn't feel this.   When she wakes up - she feels numbness in her hands , wrists and legs.   She feels fatigue all the time. She fights it back. She doenst' know what it is.   No joint pain and not hurting. But she feels like something that has taken her body - but no pain. She feels tightness and stretching feeling and pulling out in her body   She has been to a vascular surgeon and cardiologist and told that nothing wrong with her heart and her venous drainage in her legs.   She has lost her appetitie.   She had covid before she had these symptoms. The symptoms started one day but it was not right after she had covid.     She had seen ROYER Triana in 5/2024 - and had 2 days of fatigue.   She went to [place] in July. She felt ok on that trip. She had ear infection and rhinitis and went to see Marty LINTON again.   She was getting scabs in her nose - and they were bleeding. B/c she couldn't breath she was trying to removing this. She felt this go away.   Then she went to [place] and was fine.   She then went to see Dr. Kumar - family pcp - and was having chronic cough for 1 month and had the fatigue at that time and she was tessalon perles. This cough resolved.   She cont. To have the fatigue.   She saw dr. Najjar in 9/2024 - reviewed notes - she had ABIs that were normal and had venous dopplers were normal = so he  felt thsi was not from peripheral artery disease.     Her feet used to go purple all the time. This has resolved- on the bottom of her feet and was very cold.    And she saw Dr. Gonzalez, vascular at [facility] one year ago  and work up was normal at that time.   2 years ago in 3/2023  she had surgery to help with gastroparesis - and gastric outlet obsturction -  her from emptying her stomach - at [facility] - Dr. Carter.   After that shew as eating well. .  She had this issue for 3 years - before the operation. She could eat anything post operation.     She was given gabapentin 100mg at night - but she never treid it.          Wt Readings from Last 2 Encounters:   11/13/24 118 lb 8 oz (53.8 kg)   09/12/24 119 lb (54 kg)     Body mass index is 20.99 kg/m².      Current Outpatient Medications   Medication Sig Dispense Refill    rosuvastatin 20 MG Oral Tab Take 1 tablet (20 mg total) by mouth daily. 90 tablet 0    cetirizine 10 MG Oral Tab Take 1 tablet (10 mg total) by mouth daily. 90 tablet 0    cholecalciferol 50 MCG (2000 UT) Oral Tab TAKE 1 TABLET BY MOUTH 1 TIME DAILY 90 tablet 3    lisinopril 10 MG Oral Tab Take 1 tablet (10 mg total) by mouth daily. 90 tablet 3    levothyroxine 75 MCG Oral Tab Take 1 tablet (75 mcg total) by mouth before breakfast. 90 tablet 3    aspirin 81 MG Oral Tab EC Take 1 tablet (81 mg total) by mouth daily.        Past Medical History:    Age-related nuclear cataract of both eyes    Asthma (HCC)    Disorder of thyroid    Floaters- per history, but none seen on exam    High blood pressure    High cholesterol    Hyperlipidemia    Hypothyroidism    PSC (posterior subcapsular cataract), right      Past Surgical History:   Procedure Laterality Date    Appendectomy      Cataract extraction w/  intraocular lens implant Right 3/2/17    Dr. August @ [facility]    Cataract extraction w/  intraocular lens implant Left 6/12/17    Dr. August @ [facility]    Cholecystectomy      Colonoscopy  06/2016    Egd  2019     Hysterectomy        Family History   Problem Relation Age of Onset    Lipids Other         family h/o hyperlipidemia    Heart Disorder Other         family h/o Vascular disease    Cancer Brother     Diabetes Neg     Glaucoma Neg     Macular degeneration Neg       Social History: grandmother had scleorsosis -   Social History     Socioeconomic History    Marital status:    Tobacco Use    Smoking status: Former     Current packs/day: 0.00     Types: Cigarettes     Quit date:      Years since quittin.8    Smokeless tobacco: Never   Vaping Use    Vaping status: Never Used   Substance and Sexual Activity    Alcohol use: No    Drug use: No   Other Topics Concern    Caffeine Concern Yes     Comment: soda-1 cup/day    Pt has a pacemaker No    Pt has a defibrillator No    Reaction to local anesthetic No     Social Drivers of Health      [facility]    Social Connections      2 children,   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   Derm: No rashes, no oral ulcers, no alopecia, no photosensitivity, no psoriasis  HEENT: no dry eyes - had this mid summer 2024 - , no dry mouth,  Raynaud's, nasal ulcers, no parotid swelling, no neck pain, no jaw pain, no temple pain  Eyes: No visual changes,   CVS: No chest pain, no heart disease  RS: No SOB, no Cough, No Pleurtic pain,  - used to have cough -   GI: No nausea, no vomiiting, no abominal pain, no hx of ulcer, no gastritis, no heartburn, no dysphagia, no BRBPR or melena  Had gastroparesis -   : no dysuria, no hx of miscarriages, no DVT Hx, no hx of OCP,   She has to go frequently - but it's only a little bit.   Neuro:  numbness or  tingling, no headache, no hx of seizures,   Psych: no hx of anxiety or depression  ENDO: hx of thyroid disease, no hx of DM  Joint/Muscluskeltal: see HPI,   All other ROS are negative.     EXAM:   /69   Pulse 65   Resp 16   Ht 5' 3\" (1.6 m)   Wt 118 lb 8 oz (53.8 kg)   BMI 20.99 kg/m²   HEENT: Clear oropharynx, no oral ulcers, EOM intact, clear sclera, PERRLA, pleasant, no acute distress, no CAD,   No rashes  CVS: RRR, no murmurs  RS: CTAB, no crackles, no rhonchi  ABD: Soft Non tender, no HSM felt, BS positive  Joint exam:   no neck tendnerness, good ROM,   Tinel's negative   Slightly Decreased sensation below legs   Normal strength   Good cap refill both hands   No edema  EXTREMITIES: no cyanosis, clubbing or edema  NEURO: intact touch, 5/5 ue and le strength    Component      Latest Ref [facility] 7/24/2024   WBC      4.0 - 11.0 x10(3) uL 6.5    RBC      3.80 - 5.30 x10(6)uL 4.30    Hemoglobin      12.0 - 16.0 g/dL 13.6    Hematocrit      35.0 - 48.0 % 39.4    MCV      80.0 - 100.0 fL 91.6    MCH      26.0 - 34.0 pg 31.6    MCHC      31.0 - 37.0 g/dL 34.5    RDW-SD      35.1 - 46.3 fL 39.5    RDW      11.0 - 15.0 % 11.8    Platelet Count      150.0 - 450.0 10(3)uL 231.0    Prelim Neutrophil Abs      1.50 - 7.70 x10 (3) uL 3.71    Neutrophils Absolute      1.50 - 7.70 x10(3) uL 3.71    Lymphocytes Absolute      1.00 - 4.00 x10(3) uL 1.92    Monocytes Absolute      0.10 - 1.00 x10(3) uL 0.64    Eosinophils Absolute      0.00 - 0.70 x10(3) uL 0.14    Basophils Absolute      0.00 - 0.20 x10(3) uL 0.03    Immature Granulocyte Absolute      0.00 - 1.00 x10(3) uL 0.01    Neutrophils %      % 57.4    Lymphocytes %      % 29.8    Monocytes %      % 9.9    Eosinophils %      % 2.2    Basophils %      % 0.5    Immature Granulocyte %      % 0.2    Glucose      70 - 99 mg/dL 92    Sodium      136 - 145 mmol/L 140    Potassium      3.5 - 5.1 mmol/L 4.4    Chloride      98 - 112 mmol/L 107    Carbon Dioxide, Total      21.0  - 32.0 mmol/L 28.0    ANION GAP      0 - 18 mmol/L 5    BUN      9 - 23 mg/dL 15    CREATININE      0.55 - 1.02 mg/dL 0.79    BUN/CREATININE RATIO      10.0 - 20.0  19.0    CALCIUM      8.7 - 10.4 mg/dL 9.9    CALCULATED OSMOLALITY      275 - 295 mOsm/kg 290    EGFR      >=60 mL/min/1.73m2 77    Patient Fasting for BMP? No    Quantiferon TB NIL      IU/mL 0.00    Quantiferon-TB1 Minus NIL      IU/mL 0.00    Quantiferon-TB2 Minus NIL      IU/mL 0.00    Quantiferon TB Mitogen minus NIL      IU/mL >10.00    Quantiferon TB Result      Negative  Negative    CK       U/L U/L 80    HCV AB      Nonreactive   Nonreactive        6/5/2024 - DEXA  LEFT FEMORAL NECK   BMD: 0.608 gm/sq. cm. T SCORE: -2.2 Z SCORE: 0.0      LEFT TOTAL HIP   BMD: 0.722 gm/sq. cm. T SCORE: -1.8 Z SCORE: 0.1      PA LUMBAR SPINE (L1 - L4)   BMD: 0.779 gm/sq. cm. T SCORE: -2.4 Z SCORE: 0.1     8/6/2024 - GERRY    1. Normal resting ankle-brachial indices bilaterally       ASSESSMENT AND PLAN:   Mary Robledo is a 79 year old female who presents for   Chief Complaint   Patient presents with    Consult     Dx: Chronic fatigue     Joint Pain    Stiffness    Numbness     B/L Hands      Chronic fatigue, with numbness and tingling. X 4months , post viral syndrome - likely -   - had viral syndrome in July - with some nasal ulcers and cough which has resolved.   - had hx of discloration of toes but this resolved - and denies raynaud's -   - has hx of gastroparesis   - no joint pain   Check labs to evaluate for inflammatory arthritis and/or connective tissue disease   - check chest xray for her hx of cough and post viral symdrome -   - check emg/ncv studies for numbness and tingling in hands and knees down to her legs -   - for her muscle weakness and myaliga - rule out myopathy and neuropathy   - check labs for myopathy -   - check TSH for fatigued   - for numness and tingling check b 12, folate   - for hx of nasla ulcers - and persistent fatigue - check  chest xray and anca   -rtc in 1month after emg/ncv studies   - check chest xray for her chronic fatigue - rule out any patholoyg -     Summary:  Check labs   Check chest xray   Try gabapentin 100mg at night   Check emg/ncv studies - for checking numbness and tingling. = call and schedule at [phone]   Return to clinic in 1 month. 12/9 at 1:30 pm       Rocío Cruz MD  11/13/2024  9:20 AM

## 2024-11-14 LAB
ALDOLASE: 4.2 U/L
B BURGDOR IGG+IGM SER QL: NEGATIVE
CCP IGG SERPL-ACNC: 1 U/ML (ref 0–6.9)
DSDNA IGG SERPL IA-ACNC: 1.2 IU/ML
ENA AB SER QL IA: 0.1 UG/L
ENA AB SER QL IA: NEGATIVE
NUCLEAR IGG TITR SER IF: POSITIVE {TITER}
TTG IGA SER-ACNC: 0.4 U/ML (ref ?–7)

## 2024-11-15 LAB
ALBUMIN SERPL ELPH-MCNC: 4.37 G/DL (ref 3.75–5.21)
ALBUMIN/GLOB SERPL: 1.44 {RATIO} (ref 1–2)
ALPHA1 GLOB SERPL ELPH-MCNC: 0.25 G/DL (ref 0.19–0.46)
ALPHA2 GLOB SERPL ELPH-MCNC: 0.87 G/DL (ref 0.48–1.05)
ANA NUCLEOLAR TITR SER IF: 640 {TITER}
ANTI-MPO ANTIBODIES: <0.2 UNITS
ANTI-PR3 ANTIBODIES: <0.2 UNITS
B-GLOBULIN SERPL ELPH-MCNC: 0.9 G/DL (ref 0.68–1.23)
GAMMA GLOB SERPL ELPH-MCNC: 1.01 G/DL (ref 0.62–1.7)
KAPPA LC FREE SER-MCNC: 3.14 MG/DL (ref 0.33–1.94)
KAPPA LC FREE/LAMBDA FREE SER NEPH: 1.72 {RATIO} (ref 0.26–1.65)
LAMBDA LC FREE SERPL-MCNC: 1.82 MG/DL (ref 0.57–2.63)
PROT SERPL-MCNC: 7.4 G/DL (ref 5.7–8.2)

## 2024-11-18 LAB
PARVO B19 IGG: 4.8 INDEX
PARVO B19 IGM: 0.2 INDEX

## 2024-12-09 ENCOUNTER — OFFICE VISIT (OUTPATIENT)
Dept: RHEUMATOLOGY | Facility: CLINIC | Age: 79
End: 2024-12-09
Payer: MEDICARE

## 2024-12-09 VITALS
SYSTOLIC BLOOD PRESSURE: 115 MMHG | WEIGHT: 120.81 LBS | DIASTOLIC BLOOD PRESSURE: 52 MMHG | RESPIRATION RATE: 16 BRPM | HEIGHT: 63 IN | BODY MASS INDEX: 21.41 KG/M2 | HEART RATE: 61 BPM

## 2024-12-09 DIAGNOSIS — R53.82 CHRONIC FATIGUE: ICD-10-CM

## 2024-12-09 DIAGNOSIS — R76.8 POSITIVE ANA (ANTINUCLEAR ANTIBODY): Primary | ICD-10-CM

## 2024-12-09 DIAGNOSIS — R79.89 LOW TSH LEVEL: ICD-10-CM

## 2024-12-09 DIAGNOSIS — Z86.39 HISTORY OF HYPOKALEMIA: ICD-10-CM

## 2024-12-09 PROCEDURE — 3074F SYST BP LT 130 MM HG: CPT | Performed by: INTERNAL MEDICINE

## 2024-12-09 PROCEDURE — G2211 COMPLEX E/M VISIT ADD ON: HCPCS | Performed by: INTERNAL MEDICINE

## 2024-12-09 PROCEDURE — 3008F BODY MASS INDEX DOCD: CPT | Performed by: INTERNAL MEDICINE

## 2024-12-09 PROCEDURE — 99214 OFFICE O/P EST MOD 30 MIN: CPT | Performed by: INTERNAL MEDICINE

## 2024-12-09 PROCEDURE — 1126F AMNT PAIN NOTED NONE PRSNT: CPT | Performed by: INTERNAL MEDICINE

## 2024-12-09 PROCEDURE — 3078F DIAST BP <80 MM HG: CPT | Performed by: INTERNAL MEDICINE

## 2024-12-09 NOTE — PROGRESS NOTES
Mary Robledo is a 79 year old female who presents for   Chief Complaint   Patient presents with    Results   .   HPI:     I had the pleasure of seeing Mary Robledo on 11/13/2024 for evaluation.     She is a pleasant 79 year old with asthma, hypothryoidism, gastroparesis s/p gastric outlet  surgery in 3/2023, and asthma who is very active but for the last 4 months she feels weak and she walks daily. She is with her daughter. She was referred by Dr. Marrero.   She was getting numbness all over. She feels only when she sleeps she doesn't feel this.   When she wakes up - she feels numbness in her hands , wrists and legs.   She feels fatigue all the time. She fights it back. She doenst' know what it is.   No joint pain and not hurting. But she feels like something that has taken her body - but no pain. She feels tightness and stretching feeling and pulling out in her body   She has been to a vascular surgeon and cardiologist and told that nothing wrong with her heart and her venous drainage in her legs.   She has lost her appetitie.   She had covid before she had these symptoms. The symptoms started one day but it was not right after she had covid.     She had seen ROYER Triana in 5/2024 - and had 2 days of fatigue.   She went to [place] in July. She felt ok on that trip. She had ear infection and rhinitis and went to see Marty LINTON again.   She was getting scabs in her nose - and they were bleeding. B/c she couldn't breath she was trying to removing this. She felt this go away.   Then she went to [place] and was fine.   She then went to see Dr. Kumar - family pcp - and was having chronic cough for 1 month and had the fatigue at that time and she was tessalon perles. This cough resolved.   She cont. To have the fatigue.   She saw dr. Najjar in 9/2024 - reviewed notes - she had ABIs that were normal and had venous dopplers were normal = so he felt thsi was not from peripheral artery disease.     Her feet used to go  purple all the time. This has resolved- on the bottom of her feet and was very cold.    And she saw Dr. Gonzalez, vascular at [facility] one year ago  and work up was normal at that time.   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 Carter.   After that shew as eating well. .  She had this issue for 3 years - before the operation. She could eat anything post operation.     She was given gabapentin 100mg at night - but she never treid it.          Wt Readings from Last 2 Encounters:   12/09/24 120 lb 12.8 oz (54.8 kg)   11/13/24 118 lb 8 oz (53.8 kg)     Body mass index is 21.4 kg/m².      Current Outpatient Medications   Medication Sig Dispense Refill    gabapentin 100 MG Oral Cap Take 1 capsule (100 mg total) by mouth nightly. 30 capsule 2    rosuvastatin 20 MG Oral Tab Take 1 tablet (20 mg total) by mouth daily. 90 tablet 0    cetirizine 10 MG Oral Tab Take 1 tablet (10 mg total) by mouth daily. 90 tablet 0    cholecalciferol 50 MCG (2000 UT) Oral Tab TAKE 1 TABLET BY MOUTH 1 TIME DAILY 90 tablet 3    lisinopril 10 MG Oral Tab Take 1 tablet (10 mg total) by mouth daily. 90 tablet 3    levothyroxine 75 MCG Oral Tab Take 1 tablet (75 mcg total) by mouth before breakfast. 90 tablet 3    aspirin 81 MG Oral Tab EC Take 1 tablet (81 mg total) by mouth daily.        Past Medical History:    Age-related nuclear cataract of both eyes    Asthma (HCC)    Disorder of thyroid    Floaters- per history, but none seen on exam    High blood pressure    High cholesterol    Hyperlipidemia    Hypothyroidism    PSC (posterior subcapsular cataract), right      Past Surgical History:   Procedure Laterality Date    Appendectomy      Cataract extraction w/  intraocular lens implant Right 3/2/17    Dr. August @ [facility]    Cataract extraction w/  intraocular lens implant Left 6/12/17    Dr. August @ [facility]    Cholecystectomy      Colonoscopy  06/2016     Egd  2019    Hysterectomy        Family History   Problem Relation Age of Onset    Lipids Other         family h/o hyperlipidemia    Heart Disorder Other         family h/o Vascular disease    Cancer Brother     Diabetes Neg     Glaucoma Neg     Macular degeneration Neg       Social History: grandmother had scleorsosis -   Social History     Socioeconomic History    Marital status:    Tobacco Use    Smoking status: Former     Current packs/day: 0.00     Types: Cigarettes     Quit date:      Years since quittin.9    Smokeless tobacco: Never   Vaping Use    Vaping status: Never Used   Substance and Sexual Activity    Alcohol use: No    Drug use: No   Other Topics Concern    Caffeine Concern Yes     Comment: soda-1 cup/day    Pt has a pacemaker No    Pt has a defibrillator No    Reaction to local anesthetic No     Social Drivers of Health      [facility]    Social Connections      2 children,   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   Derm: No rashes, no oral ulcers, no alopecia, no photosensitivity, no psoriasis  HEENT: no dry eyes - had this mid summer 2024 - , no dry mouth,  Raynaud's, nasal ulcers, no parotid swelling, no neck pain, no jaw pain, no temple pain  Eyes: No visual changes,   CVS: No chest pain, no heart disease  RS: No SOB, no Cough, No Pleurtic pain,  - used to have cough -   GI: No nausea, no vomiiting, no abominal pain, no hx of ulcer, no gastritis, no heartburn, no dysphagia, no BRBPR or melena  Had gastroparesis -   : no dysuria, no hx of miscarriages, no DVT Hx, no hx of OCP,   She has to go frequently - but it's only a little bit.   Neuro:   numbness or tingling, no headache, no hx of seizures,   Psych: no hx of anxiety or depression  ENDO: hx of thyroid disease, no hx of DM  Joint/Muscluskeltal: see HPI,   All other ROS are negative.     EXAM:   /52   Pulse 61   Resp 16   Ht 5' 3\" (1.6 m)   Wt 120 lb 12.8 oz (54.8 kg)   BMI 21.40 kg/m²   HEENT: Clear oropharynx, no oral ulcers, EOM intact, clear sclera, PERRLA, pleasant, no acute distress, no CAD,   No rashes  CVS: RRR, no murmurs  RS: CTAB, no crackles, no rhonchi  ABD: Soft Non tender, no HSM felt, BS positive  Joint exam:   no neck tendnerness, good ROM,   Tinel's negative   Slightly Decreased sensation below legs   Normal strength   Good cap refill both hands   No edema  EXTREMITIES: no cyanosis, clubbing or edema  NEURO: intact touch, 5/5 ue and le strength    Component      Latest Ref [facility] 7/24/2024   WBC      4.0 - 11.0 x10(3) uL 6.5    RBC      3.80 - 5.30 x10(6)uL 4.30    Hemoglobin      12.0 - 16.0 g/dL 13.6    Hematocrit      35.0 - 48.0 % 39.4    MCV      80.0 - 100.0 fL 91.6    MCH      26.0 - 34.0 pg 31.6    MCHC      31.0 - 37.0 g/dL 34.5    RDW-SD      35.1 - 46.3 fL 39.5    RDW      11.0 - 15.0 % 11.8    Platelet Count      150.0 - 450.0 10(3)uL 231.0    Prelim Neutrophil Abs      1.50 - 7.70 x10 (3) uL 3.71    Neutrophils Absolute      1.50 - 7.70 x10(3) uL 3.71    Lymphocytes Absolute      1.00 - 4.00 x10(3) uL 1.92    Monocytes Absolute      0.10 - 1.00 x10(3) uL 0.64    Eosinophils Absolute      0.00 - 0.70 x10(3) uL 0.14    Basophils Absolute      0.00 - 0.20 x10(3) uL 0.03    Immature Granulocyte Absolute      0.00 - 1.00 x10(3) uL 0.01    Neutrophils %      % 57.4    Lymphocytes %      % 29.8    Monocytes %      % 9.9    Eosinophils %      % 2.2    Basophils %      % 0.5    Immature Granulocyte %      % 0.2    Glucose      70 - 99 mg/dL 92    Sodium      136 - 145 mmol/L 140    Potassium      3.5 - 5.1 mmol/L 4.4    Chloride      98 - 112 mmol/L 107    Carbon Dioxide,  Total      21.0 - 32.0 mmol/L 28.0    ANION GAP      0 - 18 mmol/L 5    BUN      9 - 23 mg/dL 15    CREATININE      0.55 - 1.02 mg/dL 0.79    BUN/CREATININE RATIO      10.0 - 20.0  19.0    CALCIUM      8.7 - 10.4 mg/dL 9.9    CALCULATED OSMOLALITY      275 - 295 mOsm/kg 290    EGFR      >=60 mL/min/1.73m2 77    Patient Fasting for BMP? No    Quantiferon TB NIL      IU/mL 0.00    Quantiferon-TB1 Minus NIL      IU/mL 0.00    Quantiferon-TB2 Minus NIL      IU/mL 0.00    Quantiferon TB Mitogen minus NIL      IU/mL >10.00    Quantiferon TB Result      Negative  Negative    CK       U/L U/L 80    HCV AB      Nonreactive   Nonreactive        Component      Latest Ref Rn 11/13/2024   Color Urine      Yellow  Light-Yellow    Clarity Urine      Clear  Clear    Spec Gravity      1.005 - 1.030  1.011    Glucose Urine      Normal mg/dL Normal    Bilirubin Urine      Negative  Negative    Ketones, UA      Negative mg/dL Negative    Blood Urine      Negative  Trace !    PH Urine      5.0 - 8.0  7.5    Protein Urine      Negative mg/dL Negative    Urobilinogen Urine      Normal  Normal    Nitrite Urine      Negative  Negative    Leukocyte Esterase       Negative  Negative    WBC Urine      0 - 5 /HPF 1-5    RBC Urine      0 - 2 /HPF 3-5 !    Bacteria Urine      None Seen /HPF None Seen    SQUAM EPI CELLS UR      None Seen /HPF Few !    RENAL TUBULAR EPITHELIAL CELLS      None Seen /HPF None Seen    TRANSITIONAL EPI CELLS      None Seen /HPF None Seen    YEAST URINE      None Seen /HPF None Seen    WBC      4.0 - 11.0 x10(3) uL 4.8    RBC      3.80 - 5.30 x10(6)uL 4.33    Hemoglobin      12.0 - 16.0 g/dL 14.1    Hematocrit      35.0 - 48.0 % 40.5    MCV      80.0 - 100.0 fL 93.5    MCH      26.0 - 34.0 pg 32.6    MCHC      31.0 - 37.0 g/dL 34.8    RDW      11.0 - 15.0 % 12.0    RDW-SD      35.1 - 46.3 fL 41.5    Platelet Count      150.0 - 450.0 10(3)uL 231.0    MYELOPEROX ANTIBODIES, IGG      0.0 - 0.9 units <0.2    SERINE  PROTEASE3, IGG      0.0 - 0.9 units <0.2    Cytoplasmic (C-ANCA)      Neg:<1:20 titer <1:20    Perinuclear (P-ANCA)      Neg:<1:20 titer <1:20    ATYPICAL PANCA      Neg:<1:20 titer <1:20    Expanded LAVONNE Antibody Screen, IGG      <0.7 ug/l 0.10    Anti-dsDNA antibody      <10 IU/mL 1.2    Connective Tissue Disease Screen Interpretation      Negative  Negative    Parvo B19 IgG      0.0 - 0.8 index 4.8 (H)    Parvo B19 IgM      0.0 - 0.8 index 0.2    DANY Titer/Pattern      <80  640 !    Reviewed By: Meghann Galarza M.D.    C-REACTIVE PROTEIN      <1.00 mg/dL <0.40    SED RATE      0 - 30 mm/Hr 11    Aldolase      3.3 - 10.3 U/L 4.2    CK       U/L U/L 71    Lyme Screen IgG and IgM      Negative  Negative    DANY SCREEN      Negative  Positive !    Tissue Transglutaminase IgA Ab      <7.0 U/mL 0.4    C-Citrullinated Peptide IgG AB      0.0 - 6.9 U/mL 1.0    RHEUMATOID FACTOR      <14.0 IU/mL 9.7    Cryoglob Ql      None detected  Comment    Vitamin B12      211 - 911 pg/mL 388    FOLATE (FOLIC ACID), SERUM      >5.4 ng/mL 17.3    TSH      0.550 - 4.780 uIU/mL 0.109 (L)       Legend:  ! Abnormal  (H) High  (L) Low  6/5/2024 - DEXA  LEFT FEMORAL NECK   BMD: 0.608 gm/sq. cm. T SCORE: -2.2 Z SCORE: 0.0      LEFT TOTAL HIP   BMD: 0.722 gm/sq. cm. T SCORE: -1.8 Z SCORE: 0.1      PA LUMBAR SPINE (L1 - L4)   BMD: 0.779 gm/sq. cm. T SCORE: -2.4 Z SCORE: 0.1     8/6/2024 - GERRY    1. Normal resting ankle-brachial indices bilaterally       ASSESSMENT AND PLAN:   Mary Robledo is a 79 year old female who presents for   Chief Complaint   Patient presents with    Results     Chronic fatigue, with numbness and tingling. X 4months , post viral syndrome - likely -   - had viral syndrome in July - with some nasal ulcers and cough which has resolved.   - had hx of discloration of toes but this resolved - and denies raynaud's -   - has hx of gastroparesis   - no joint pain   DANY 1:640 positive - but connective tissue disease panel is  negative.   - other labs for  inflammatory arthritis and/or connective tissue disease are negative   - normal chest xray for her hx of cough and post viral symdrome -   - check emg/ncv studies for numbness and tingling in hands and knees down to her legs - in 1/16/2025   -normal ck -, and aldolase  for her muscle weakness and myaliga -   - low  TSH for fatigue- check free T3 and T4 levels, and anti thyroid abs -   - for numness and tingling- awati emg/ - normal  b 12, folate levles   -rtc in 2-3 month after emg/ncv studies   - normal chest xray for her chronic fatigue - rule out any patholoyg -   = not better on gabapentin 100mg at night - so she can stop     2. Hx of hypokalemia - check aldosterone levels - on lisiniopril which may be keeping her potassium normal    Summary:   Check thyroid tests - and follow up with ROYER Hutchins  Check emg/ncv studies - in January 16, 2025 - for checking numbness and tingling.   Stop  gabapentin   Return to clinic in 3 months.       Rocío Cruz MD  12/9/2024   1:46 PM       is applicable because the patient's medical record notes reflects the ongoing nature of the continuous longitudinal relationship of care, and the medical record indicates that there is ongoing treatment of a serious/complex medical condition.

## 2024-12-09 NOTE — PATIENT INSTRUCTIONS
Check thyroid tests - and follow up with ROYER Hutchins  Check emg/ncv studies - in January 16, 2025 - for checking numbness and tingling.   Stop  gabapentin   Return to clinic in 3 months.

## 2024-12-10 ENCOUNTER — LAB ENCOUNTER (OUTPATIENT)
Dept: LAB | Age: 79
End: 2024-12-10
Attending: INTERNAL MEDICINE
Payer: MEDICARE

## 2024-12-10 DIAGNOSIS — Z86.39 HISTORY OF HYPOKALEMIA: ICD-10-CM

## 2024-12-10 DIAGNOSIS — R79.89 LOW TSH LEVEL: ICD-10-CM

## 2024-12-10 DIAGNOSIS — R53.82 CHRONIC FATIGUE: ICD-10-CM

## 2024-12-10 DIAGNOSIS — R76.8 POSITIVE ANA (ANTINUCLEAR ANTIBODY): ICD-10-CM

## 2024-12-10 LAB
ALBUMIN SERPL-MCNC: 4.7 G/DL (ref 3.2–4.8)
ALBUMIN/GLOB SERPL: 1.8 {RATIO} (ref 1–2)
ALP LIVER SERPL-CCNC: 61 U/L
ALT SERPL-CCNC: 18 U/L
ANION GAP SERPL CALC-SCNC: 9 MMOL/L (ref 0–18)
AST SERPL-CCNC: 27 U/L (ref ?–34)
BILIRUB SERPL-MCNC: 0.4 MG/DL (ref 0.2–1.1)
BUN BLD-MCNC: 11 MG/DL (ref 9–23)
BUN/CREAT SERPL: 12.8 (ref 10–20)
CALCIUM BLD-MCNC: 10.5 MG/DL (ref 8.7–10.4)
CHLORIDE SERPL-SCNC: 107 MMOL/L (ref 98–112)
CO2 SERPL-SCNC: 28 MMOL/L (ref 21–32)
CREAT BLD-MCNC: 0.86 MG/DL
EGFRCR SERPLBLD CKD-EPI 2021: 70 ML/MIN/1.73M2 (ref 60–?)
FASTING STATUS PATIENT QL REPORTED: NO
GLOBULIN PLAS-MCNC: 2.6 G/DL (ref 2–3.5)
GLUCOSE BLD-MCNC: 103 MG/DL (ref 70–99)
OSMOLALITY SERPL CALC.SUM OF ELEC: 298 MOSM/KG (ref 275–295)
POTASSIUM SERPL-SCNC: 4.5 MMOL/L (ref 3.5–5.1)
PROT SERPL-MCNC: 7.3 G/DL (ref 5.7–8.2)
SODIUM SERPL-SCNC: 144 MMOL/L (ref 136–145)
T3FREE SERPL-MCNC: 3.39 PG/ML (ref 2.4–4.2)
T4 FREE SERPL-MCNC: 1.4 NG/DL (ref 0.8–1.7)
THYROGLOB SERPL-MCNC: <15 U/ML (ref ?–60)
THYROPEROXIDASE AB SERPL-ACNC: 50 U/ML (ref ?–60)
TSI SER-ACNC: 0.24 UIU/ML (ref 0.55–4.78)

## 2024-12-10 PROCEDURE — 84439 ASSAY OF FREE THYROXINE: CPT

## 2024-12-10 PROCEDURE — 86376 MICROSOMAL ANTIBODY EACH: CPT

## 2024-12-10 PROCEDURE — 82088 ASSAY OF ALDOSTERONE: CPT

## 2024-12-10 PROCEDURE — 86800 THYROGLOBULIN ANTIBODY: CPT

## 2024-12-10 PROCEDURE — 84443 ASSAY THYROID STIM HORMONE: CPT

## 2024-12-10 PROCEDURE — 84481 FREE ASSAY (FT-3): CPT

## 2024-12-10 PROCEDURE — 80053 COMPREHEN METABOLIC PANEL: CPT

## 2024-12-10 PROCEDURE — 36415 COLL VENOUS BLD VENIPUNCTURE: CPT

## 2024-12-13 ENCOUNTER — OFFICE VISIT (OUTPATIENT)
Dept: FAMILY MEDICINE CLINIC | Facility: CLINIC | Age: 79
End: 2024-12-13
Payer: MEDICARE

## 2024-12-13 VITALS
HEIGHT: 63 IN | SYSTOLIC BLOOD PRESSURE: 128 MMHG | DIASTOLIC BLOOD PRESSURE: 60 MMHG | WEIGHT: 120 LBS | HEART RATE: 69 BPM | BODY MASS INDEX: 21.26 KG/M2

## 2024-12-13 DIAGNOSIS — R21 RASH AND NONSPECIFIC SKIN ERUPTION: Primary | ICD-10-CM

## 2024-12-13 DIAGNOSIS — E03.9 HYPOTHYROIDISM, UNSPECIFIED TYPE: ICD-10-CM

## 2024-12-13 LAB — ALDOSTERONE: 7.5 NG/DL

## 2024-12-13 PROCEDURE — 1126F AMNT PAIN NOTED NONE PRSNT: CPT | Performed by: PHYSICIAN ASSISTANT

## 2024-12-13 PROCEDURE — 3078F DIAST BP <80 MM HG: CPT | Performed by: PHYSICIAN ASSISTANT

## 2024-12-13 PROCEDURE — 99213 OFFICE O/P EST LOW 20 MIN: CPT | Performed by: PHYSICIAN ASSISTANT

## 2024-12-13 PROCEDURE — 3008F BODY MASS INDEX DOCD: CPT | Performed by: PHYSICIAN ASSISTANT

## 2024-12-13 PROCEDURE — 3074F SYST BP LT 130 MM HG: CPT | Performed by: PHYSICIAN ASSISTANT

## 2024-12-13 PROCEDURE — 1159F MED LIST DOCD IN RCRD: CPT | Performed by: PHYSICIAN ASSISTANT

## 2024-12-13 RX ORDER — LEVOTHYROXINE SODIUM 50 UG/1
50 TABLET ORAL
Qty: 90 TABLET | Refills: 3 | Status: SHIPPED | OUTPATIENT
Start: 2024-12-13

## 2024-12-13 RX ORDER — PREDNISONE 20 MG/1
20 TABLET ORAL DAILY
Qty: 7 TABLET | Refills: 0 | Status: SHIPPED | OUTPATIENT
Start: 2024-12-13 | End: 2024-12-20

## 2024-12-13 RX ORDER — HYDROCORTISONE 25 MG/G
1 CREAM TOPICAL 2 TIMES DAILY
Qty: 28 G | Refills: 0 | Status: SHIPPED | OUTPATIENT
Start: 2024-12-13

## 2024-12-13 NOTE — PROGRESS NOTES
HPI:     HPI  A 75-year-old female presents with rashes on her neck and upper chest for the past week. The rash is red and itchy.   She denies fever, cough, sore throat, N/V, facial/lip/tongue swelling, or SOB.    Medications:     Current Outpatient Medications   Medication Sig Dispense Refill    levothyroxine 50 MCG Oral Tab Take 1 tablet (50 mcg total) by mouth before breakfast. 90 tablet 3    predniSONE 20 MG Oral Tab Take 1 tablet (20 mg total) by mouth daily for 7 days. 7 tablet 0    hydrocortisone 2.5 % External Cream Apply 1 Application topically 2 (two) times daily. 28 g 0    rosuvastatin 20 MG Oral Tab Take 1 tablet (20 mg total) by mouth daily. 90 tablet 0    cetirizine 10 MG Oral Tab Take 1 tablet (10 mg total) by mouth daily. 90 tablet 0    cholecalciferol 50 MCG (2000 UT) Oral Tab TAKE 1 TABLET BY MOUTH 1 TIME DAILY 90 tablet 3    lisinopril 10 MG Oral Tab Take 1 tablet (10 mg total) by mouth daily. 90 tablet 3    levothyroxine 75 MCG Oral Tab Take 1 tablet (75 mcg total) by mouth before breakfast. 90 tablet 3    aspirin 81 MG Oral Tab EC Take 1 tablet (81 mg total) by mouth daily.         Allergies:   Allergies[1]    History:     Health Maintenance   Topic Date Due    Zoster Vaccines (1 of 2) Never done    Colorectal Cancer Screening  06/01/2023    MA Annual Health Assessment  01/01/2024    Annual Depression Screening  01/01/2024    Fall Risk Screening (Annual)  01/01/2024    COVID-19 Vaccine (6 - 2024-25 season) 09/01/2024    Influenza Vaccine (1) 10/01/2024    DEXA Scan  Completed    Pneumococcal Vaccine: 65+ Years  Completed    Mammogram  Discontinued       No LMP recorded. Patient has had a hysterectomy.   Past Medical History:     Past Medical History:    Age-related nuclear cataract of both eyes    Asthma (HCC)    Disorder of thyroid    Floaters- per history, but none seen on exam    High blood pressure    High cholesterol    Hyperlipidemia    Hypothyroidism    PSC (posterior subcapsular  cataract), right       Past Surgical History:     Past Surgical History:   Procedure Laterality Date    Appendectomy      Cataract extraction w/  intraocular lens implant Right 3/2/17    Dr. August @ [facility]    Cataract extraction w/  intraocular lens implant Left 17    Dr. August @ [facility]    Cholecystectomy      Colonoscopy  2016    Egd  2019    Hysterectomy         Family History:     Family History   Problem Relation Age of Onset    Lipids Other         family h/o hyperlipidemia    Heart Disorder Other         family h/o Vascular disease    Cancer Brother     Diabetes Neg     Glaucoma Neg     Macular degeneration Neg        Social History:     Social History     Socioeconomic History    Marital status:      Spouse name: Not on file    Number of children: Not on file    Years of education: Not on file    Highest education level: Not on file   Occupational History    Not on file   Tobacco Use    Smoking status: Former     Current packs/day: 0.00     Types: Cigarettes     Quit date:      Years since quittin.9    Smokeless tobacco: Never   Vaping Use    Vaping status: Never Used   Substance and Sexual Activity    Alcohol use: No    Drug use: No    Sexual activity: Not on file   Other Topics Concern     Service Not Asked    Blood Transfusions Not Asked    Caffeine Concern Yes     Comment: soda-1 cup/day    Occupational Exposure Not Asked    Hobby Hazards Not Asked    Sleep Concern Not Asked    Stress Concern Not Asked    Weight Concern Not Asked    Special Diet Not Asked    Back Care Not Asked    Exercise Not Asked    Bike Helmet Not Asked    Seat Belt Not Asked    Self-Exams Not Asked    Grew up on a farm Not Asked    History of tanning Not Asked    Outdoor occupation Not Asked    Pt has a pacemaker No    Pt has a defibrillator No    Breast feeding Not Asked    Reaction to local anesthetic No   Social History Narrative    Not on file     Social Drivers of Health     Financial Resource Strain:  Not on file   Food Insecurity: Not on file   Transportation Needs: Not on file   Physical Activity: Not on file   Stress: Not on file   Social Connections: Unknown (5/13/2021)    [facility]    Social Connections     Conversations with friends/family/neighbors per week: Not on file   Housing Stability: Not on file       Review of Systems:   Review of Systems   Skin:  Positive for rash.        Vitals:    12/13/24 1427 12/13/24 1451   BP: 143/68 128/60   Pulse: 69    Weight: 120 lb (54.4 kg)    Height: 5' 3\" (1.6 m)      Body mass index is 21.26 kg/m².    Physical Exam:   Physical Exam  Vitals reviewed.   Skin:     Findings: Rash present.      There are erythema macular on her neck and upper chest.     Assessment and Plan::     Problem List Items Addressed This Visit          Endocrine and Metabolic    Hypothyroidism    Relevant Medications    levothyroxine 50 MCG Oral Tab        Other Relevant Orders    Free T4, (Free Thyroxine)    Free T3 (Triiodothryronine)    Assay, Thyroid Stim Hormone    Reviewed and discussed lab results with the patient. Decrease Levothyroxine 50 mcg PO QAM. Recheck TFT in 6 weeks.     Other Visit Diagnoses       Rash and nonspecific skin eruption    -  Primary    Relevant Medications    predniSONE 20 MG Oral Tab    hydrocortisone 2.5 % External Cream          Discussed plan of care with pt and pt is in agreement.All questions answered. Pt to call with questions or concerns.         [1]   Allergies  Allergen Reactions    Adhesive Tape RASH     ekg pads.    Codeine SWELLING    Triamazide [Hydrochlorothiazide W/Triamterene] OTHER (SEE COMMENTS)     Hypokalemia.    Dust Mite Extract OTHER (SEE COMMENTS)    Nystatin RASH and ITCHING

## 2025-01-14 DIAGNOSIS — J45.40 MODERATE PERSISTENT ASTHMA WITHOUT COMPLICATION (HCC): ICD-10-CM

## 2025-01-16 ENCOUNTER — PROCEDURE VISIT (OUTPATIENT)
Dept: PHYSICAL MEDICINE AND REHAB | Facility: CLINIC | Age: 80
End: 2025-01-16
Payer: MEDICARE

## 2025-01-16 DIAGNOSIS — R20.0 NUMBNESS AND TINGLING: ICD-10-CM

## 2025-01-16 DIAGNOSIS — R20.2 NUMBNESS AND TINGLING: ICD-10-CM

## 2025-01-16 PROCEDURE — 95909 NRV CNDJ TST 5-6 STUDIES: CPT | Performed by: PHYSICAL MEDICINE & REHABILITATION

## 2025-01-16 PROCEDURE — 95886 MUSC TEST DONE W/N TEST COMP: CPT | Performed by: PHYSICAL MEDICINE & REHABILITATION

## 2025-01-16 NOTE — PROGRESS NOTES
[facility]  Electromyography Consultation      History of Present Illness:    Dear Dr. Cruz,  Thank you for the opportunity to see Mary Robledo for electrodiagnostic consultation today. As you know the patient is a 80 year old female with a chief complaint of generalized fatigue and weakness.  She also has generalized paresthesias.  These are symmetric.  Denies diabetes and alcohol consumption.  She is hypothyroid.  She was referred for a 4 extremity EMG.  Given symmetry of her symptoms the right side was tested.  If right-sided abnormalities are found, the left side will also be tested.    PAST MEDICAL HISTORY:  Past Medical History:    Age-related nuclear cataract of both eyes    Asthma (HCC)    Disorder of thyroid    Floaters- per history, but none seen on exam    High blood pressure    High cholesterol    Hyperlipidemia    Hypothyroidism    PSC (posterior subcapsular cataract), right       SURGICAL HISTORY:  Past Surgical History:   Procedure Laterality Date    Appendectomy      Cataract extraction w/  intraocular lens implant Right 3/2/17    Dr. August @ [facility]    Cataract extraction w/  intraocular lens implant Left 17    Dr. August @ [facility]    Cholecystectomy      Colonoscopy  2016    Egd  2019    Hysterectomy         SOCIAL HISTORY:   Social History     Occupational History    Not on file   Tobacco Use    Smoking status: Former     Current packs/day: 0.00     Types: Cigarettes     Quit date:      Years since quittin.0    Smokeless tobacco: Never   Vaping Use    Vaping status: Never Used   Substance and Sexual Activity    Alcohol use: No    Drug use: No    Sexual activity: Not on file       FAMILY HISTORY:   Family History   Problem Relation Age of Onset    Lipids Other         family h/o hyperlipidemia    Heart Disorder Other         family h/o Vascular disease    Cancer Brother     Diabetes Neg     Glaucoma Neg     Macular degeneration Neg         CURRENT MEDICATIONS:   Current Outpatient Medications   Medication Sig Dispense Refill    albuterol 108 (90 Base) MCG/ACT Inhalation Aero Soln Inhale 2 puffs into the lungs every 4 (four) hours as needed for Wheezing. 54 g 0    levothyroxine 50 MCG Oral Tab Take 1 tablet (50 mcg total) by mouth before breakfast. 90 tablet 3    hydrocortisone 2.5 % External Cream Apply 1 Application topically 2 (two) times daily. 28 g 0    rosuvastatin 20 MG Oral Tab Take 1 tablet (20 mg total) by mouth daily. 90 tablet 0    cetirizine 10 MG Oral Tab Take 1 tablet (10 mg total) by mouth daily. 90 tablet 0    cholecalciferol 50 MCG (2000 UT) Oral Tab TAKE 1 TABLET BY MOUTH 1 TIME DAILY 90 tablet 3    lisinopril 10 MG Oral Tab Take 1 tablet (10 mg total) by mouth daily. 90 tablet 3    levothyroxine 75 MCG Oral Tab Take 1 tablet (75 mcg total) by mouth before breakfast. 90 tablet 3    aspirin 81 MG Oral Tab EC Take 1 tablet (81 mg total) by mouth daily.         PHYSICAL EXAM:   There were no vitals taken for this visit.    There is no height or weight on file to calculate BMI.      General: No immediate distress   Extremities: peripheral pulses intact, no lower extremity edema bilaterally   Skin: No lesions noted.   Neuro:   Strength: Upper and lower extremities have 5/5 strength  Muscle bulk: No atrophy  Sensation: Normal upper and lower extremities  Reflexes: Normal upper and lower extremities  SLR: Negative bilaterally      EMG/NCV  Sensory NCS      Nerve / Sites Distance Segments Peak Lat NP Amp    cm  ms µV   R MEDIAN - Dig III Antidr      Wrist 14 Wrist - Dig III 3.40 35.8      Ref.  Ref. 3.80 20.0      Palm 7 Palm - Dig III 1.95 41.8   R ULNAR - Dig V Antidr      Wrist 14 Wrist - Dig V 2.75 44.8      Ref.  Ref. 3.80 10.0   R SURAL - Antidr      Calf 14 Calf - Lat Mall 4.20 8.9      Ref.  Ref. 4.20 5.0       Motor NCS      Nerve / Sites Distance Segments Latency Amplitude Velocity Amp.1-2 Peak Dur. Area    cm  ms mV m/s % ms  mVms   R MEDIAN - APB      Wrist 8 Wrist - APB 3.85 6.9  100 4.70 20.1      Ref.  Ref. 4.40 4.0          Elbow 18 Elbow - Wrist 7.25 6.7 52.9 96.2 5.00 20.6      Ref.  Ref.   49.0      R ULNAR - ADM      Wrist 8 Wrist - ADM 2.75 12.2  100 6.40 30.0      Ref.  Ref. 4.20 5.0          B.Elbow 19 B.Elbow - Wrist 5.80 9.0 62.3 73.1 6.45 25.6      Ref.  Ref.   50.0         A.Elbow 11 A.Elbow - B.Elbow 7.80 7.7 55.0 63.1 6.70 24.2   R COMM PERONEAL - EDB      Ankle 8 Ankle - EDB 4.15 5.7  100 4.90 15.8      Ref.  Ref. 6.00 2.0          Fib Head 26 Fib Head - Ankle 10.55 5.4 40.6 94 5.20 16.1      Ref.  Ref.   40.0          EMG Summary Table     Spontaneous MUAP Recruitment    IA Fib PSW Fasc H.F. Amp Dur. PPP Pattern   R. DELTOID N None None None None N N N N   R. TRICEPS N None None None None N N N N   R. BICEPS N None None None None N N N N   R. FLEX CARPI RAD N None None None None N N N N   R. FIRST D INTEROSS N None None None None N N N N   R. VAST LATERALIS N None None None None N N N N   R. TIB ANTERIOR N None None None None N N N N   R. PERON LONGUS N None None None None N N N N   R. GASTROCN (MED) N None None None None N N N N   R. D INTEROSS (LL) N None None None None N N N N       Findings: Extremities were warmed with hot packs for 15 minutes prior to testing and skin temperature maintained above 32 C.  Sensory nerve conduction studies revealed normal right median, ulnar and sural responses.  Motor nerve conduction studies revealed normal right median, ulnar and common peroneal responses with normal latencies, amplitudes and conduction velocities.  Needle EMG of the right upper and lower extremity were normal in all muscles tested.  Please refer to EMG summary table above.  Impression:  1.  Normal study.  2.  No electrodiagnostic evidence of peripheral polyneuropathy.  3.  No electrodiagnostic evidence of motor neuron disease.  4.  No electrodiagnostic evidence of myopathy.      ASSESSMENT AND PLAN:  1. Numbness  and tingling  The patient's EMG is normal.  Unfortunately this does not shed light on the etiology of the patient's symptoms.  That being said, small fiber neuropathy is sometimes associated with hypothyroidism.  Consider skin biopsy for definitive diagnosis.  Treatment would be symptomatic with neuropathic pain medication and optimal thyroid hormone control.  - EMG (At [facility])        Thank you for the opportunity to participate in the care of this patient.  Sincerely,    Wu Cosby M.D.  Diplomate American Board of Physical Medicine and Rehabilitation

## 2025-01-20 RX ORDER — ALBUTEROL SULFATE 90 UG/1
2 INHALANT RESPIRATORY (INHALATION) EVERY 4 HOURS PRN
Qty: 54 G | Refills: 0 | Status: SHIPPED | OUTPATIENT
Start: 2025-01-20

## 2025-01-20 NOTE — TELEPHONE ENCOUNTER
Please review; protocol failed/No Protocol    Patient reported no longer taking- patient requesting and stated symptoms are back and would like a refill-please advise if refill is appropriate.     Requested Prescriptions   Pending Prescriptions Disp Refills    ALBUTEROL 108 (90 Base) MCG/ACT Inhalation Aero Soln [Pharmacy Med Name: Albuterol Sulfate Hfa 108 Mcg/Act Aer Pras] 18 g 0     Sig: inhale 2 puffs every 4 hours as needed for Wheezing.       Asthma & COPD Medication Protocol Failed - 1/20/2025  8:00 AM        Failed - Medication is active on med list        Passed - Appointment in past 6 or next 3 months      Recent Outpatient Visits              1 month ago Rash and nonspecific skin eruption    [facility], [place], Minhxuyen, PA-C    Office Visit    1 month ago Positive DANY (antinuclear antibody)    [facility], Rocío Peng MD    Office Visit    2 months ago Chronic fatigue    [facility], Rocío Peng MD    Office Visit    4 months ago Neuropathy    [facility], [place] Najjar, Samer F, MD    Office Visit    5 months ago Acute viral syndrome    [facility], Brad Esquivel MD    Office Visit                           Recent Outpatient Visits              1 month ago Rash and nonspecific skin eruption    [facility], [place], Minhxuyen, PA-C    Office Visit    1 month ago Positive DANY (antinuclear antibody)    [facility], Rocío Peng MD    Office Visit    2 months ago [facility] fatigue    [facility]    Office Visit    4 months ago Neuropathy    [facility], [place] Najjar, Samer F, MD    Office Visit    5 months ago Acute  viral syndrome    [facility], [address], Brad Esquivel MD    Office Visit

## 2025-01-30 NOTE — TELEPHONE ENCOUNTER
Please review;  [facility] protocol failed/ No protocol     Requested Prescriptions   Pending Prescriptions Disp Refills    LEVOTHYROXINE 75 MCG Oral Tab [Pharmacy Med Name: Levothyroxine Sodium 75 Mcg Tab Lupi] 90 tablet 0     Sig: TAKE ONE TABLET BY MOUTH DAILY BEFORE BREAKFAST       Thyroid Medication Protocol Failed - 1/30/2025  4:59 PM        Failed - Last TSH value is normal     Lab Results   Component Value Date    TSH 0.243 (L) 12/10/2024                 Passed - TSH in past 12 months        Passed - In person appointment or virtual visit in the past 12 mos or appointment in next 3 mos     Recent Outpatient Visits              1 month ago Rash and nonspecific skin eruption    [facility] [place]    Office Visit    1 month ago Positive DANY (antinuclear antibody)    [facility], Rocío Peng MD    Office Visit    2 months ago Chronic fatigue    [facility], Rocío Peng MD    Office Visit    4 months ago Neuropathy    [facility], [place] Najjar, Samer F, MD    Office Visit    5 months ago Acute viral syndrome    [facility], Brad Esquivel MD    Office Visit                      Passed - Medication is active on med list             Recent Outpatient Visits              1 month ago Rash and nonspecific skin eruption    [facility], [place], Minhxuyen, PA-C    Office Visit    1 month ago Positive DANY (antinuclear antibody)    [facility]    [facility]    2 months ago [facility] fatigue    [facility]    Office Visit    4 months ago Neuropathy    [facility], [place] Najjar,  Jason VELAZQUEZ MD    Office Visit    5 months ago Acute viral syndrome    [facility], [address], Brad Esquivel MD    Office Visit

## 2025-01-31 RX ORDER — LEVOTHYROXINE SODIUM 75 UG/1
75 TABLET ORAL
Qty: 90 TABLET | Refills: 3 | Status: SHIPPED | OUTPATIENT
Start: 2025-01-31

## 2025-02-21 ENCOUNTER — TELEPHONE (OUTPATIENT)
Dept: FAMILY MEDICINE CLINIC | Facility: CLINIC | Age: 80
End: 2025-02-21

## 2025-02-21 RX ORDER — ROSUVASTATIN CALCIUM 20 MG/1
20 TABLET, COATED ORAL DAILY
Qty: 90 TABLET | Refills: 3 | Status: SHIPPED | OUTPATIENT
Start: 2025-02-21

## 2025-02-21 NOTE — TELEPHONE ENCOUNTER
Verified name and  of patient.    Daughter of patient (on release of information) calling to request appointment for patient to be seen by Dr. Marrero.     She states that patient has had ongoing fatigue for the past 4-5 months- has seen ROYER Verde and multiple specialists regarding this- no resolution yet at this time.    She is requesting appointment to discuss the fatigue with Dr. Marrero.    Appointment scheduled:  Future Appointments   Date Time Provider Department Center   2025  8:00 AM [facility] RM1 [facility] EM [facility]   3/3/2025  3:00 PM Daiana Marrero MD [facility]        Dressing: dry sterile dressing

## 2025-02-21 NOTE — TELEPHONE ENCOUNTER
Refill Per Protocol     Rosuvastatin Calcium 20 mg Oral Daily    Protocol Details    LAST WRITTEN: for 90 with 0 refills    Medication refill pended for your review/approval

## 2025-02-25 ENCOUNTER — HOSPITAL ENCOUNTER (OUTPATIENT)
Dept: MAMMOGRAPHY | Facility: HOSPITAL | Age: 80
Discharge: HOME | End: 2025-02-25
Attending: PHYSICIAN ASSISTANT
Payer: MEDICARE

## 2025-02-25 DIAGNOSIS — R92.8 ABNORMAL MAMMOGRAM: ICD-10-CM

## 2025-02-25 PROCEDURE — 77065 DX MAMMO INCL CAD UNI: CPT | Performed by: PHYSICIAN ASSISTANT

## 2025-02-25 PROCEDURE — 77061 BREAST TOMOSYNTHESIS UNI: CPT | Performed by: PHYSICIAN ASSISTANT

## 2025-04-19 DIAGNOSIS — I10 ESSENTIAL HYPERTENSION: ICD-10-CM

## 2025-04-22 RX ORDER — LISINOPRIL 10 MG/1
10 TABLET ORAL DAILY
Qty: 90 TABLET | Refills: 3 | Status: SHIPPED | OUTPATIENT
Start: 2025-04-22

## 2025-04-22 NOTE — TELEPHONE ENCOUNTER
Refill Per Protocol     Requested Prescriptions   Pending Prescriptions Disp Refills    LISINOPRIL 10 MG Oral Tab [Pharmacy Med Name: Lisinopril 10 Mg Tab Lupi] 90 tablet 0     Sig: TAKE ONE TABLET BY MOUTH ONE TIME DAILY       Hypertension Medications Protocol Passed - 4/22/2025  3:56 PM        Passed - CMP or BMP in past 12 months        Passed - Last BP reading less than 140/90     BP Readings from Last 1 Encounters:   03/03/25 136/67               Passed - In person appointment or virtual visit in the past 12 mos or appointment in next 3 mos     Recent Outpatient Visits              1 month ago Emotional distress    [facility], Daiana Ervin MD    Office Visit    4 months ago Rash and nonspecific skin eruption    [facility], [place], Minhxuyen, PA-C    Office Visit    4 months ago Positive DANY (antinuclear antibody)    [facility], Rocío Peng MD    Office Visit    5 months ago Chronic fatigue    [facility], Rocío Peng MD    Office Visit    7 months ago Neuropathy    [facility], [place] Najjar, Samer F, MD    Office Visit          Future Appointments         Provider Department Appt Notes    In 3 weeks Diana Barron MD [facility] bump on roof of mouth, ear cleaning-unable to attach ref                    Passed - EGFRCR or GFRNAA > 50     GFR Evaluation  EGFRCR: 70 , resulted on 12/10/2024          Passed - Medication is active on med list

## 2025-05-13 ENCOUNTER — OFFICE VISIT (OUTPATIENT)
Dept: OTOLARYNGOLOGY | Facility: CLINIC | Age: 80
End: 2025-05-13
Payer: MEDICARE

## 2025-05-13 DIAGNOSIS — M27.0 TORUS PALATINUS: Primary | ICD-10-CM

## 2025-05-13 DIAGNOSIS — H93.12 LEFT-SIDED TINNITUS: ICD-10-CM

## 2025-05-13 PROCEDURE — 1160F RVW MEDS BY RX/DR IN RCRD: CPT | Performed by: SPECIALIST

## 2025-05-13 PROCEDURE — 99213 OFFICE O/P EST LOW 20 MIN: CPT | Performed by: SPECIALIST

## 2025-05-13 PROCEDURE — 1159F MED LIST DOCD IN RCRD: CPT | Performed by: SPECIALIST

## 2025-05-13 NOTE — PROGRESS NOTES
Mary Robledo is a 80 year old female.   Chief Complaint   Patient presents with    Ear Problem     Patient Presents with: left ear pain, buzzing sound     Mouth/Lip Problem     Patient Presents with feels bump on roof of mouth for 2 months      HPI:   Patient here to have her palate checked.  Also reports left-sided tinnitus.    Current Medications[1]   Past Medical History[2]   Social History:  Short Social Hx on File[3]     REVIEW OF SYSTEMS:   GENERAL HEALTH: feels well otherwise  GENERAL : denies fever, chills, sweats, weight loss, weight gain  SKIN: denies any unusual skin lesions or rashes  RESPIRATORY: denies shortness of breath with exertion  NEURO: denies headaches    EXAM:   There were no vitals taken for this visit.  System Details   Skin Inspection - Normal.   Constitutional Overall appearance - Normal.   Head/Face Facial features - Normal. Eyebrows - Normal. Skull - Normal.   Eyes Conjunctiva - Right: Normal, Left: Normal. Pupil - Right: Normal, Left: Normal.    Ears Inspection - Right: Normal, Left: Normal.   Canal - Right: Normal, Left: Normal.   TM - Right: Normal, Left: Normal.  No middle ear fluid either ear  Tuning fork to 56 Hz right equals left   Nasal External nose - Normal.   Nasal septum - Normal.  Turbinates - Normal.   Oral/Oropharynx Lips - Normal, Tonsils - Normal, Tongue - Normal.  Palate-midline small torus palatinus   Neck Exam Inspection - Normal. Palpation - Normal. Parotid gland - Normal. Thyroid gland - Normal.  No carotid bruits by auscultation   Lymph Detail Submental. Submandibular. Anterior cervical. Posterior cervical. Supraclavicular all without enlargement   Psychiatric Orientation - Oriented to time, place, person & situation. Appropriate mood and affect.   Neurological Memory - Normal. Cranial nerves - Cranial nerves II through XII grossly intact.     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  No treatment needed.   Hard to palpation.    2. Left-sided tinnitus  Tuning fork evaluation suggest symmetric hearing.  Patient can consider an audiogram.  She states that the tinnitus does not bother her.      The patient indicates understanding of these issues and agrees to the plan.      Diana Barron MD  2025  4:15 PM       [1]   Current Outpatient Medications   Medication Sig Dispense Refill    lisinopril 10 MG Oral Tab Take 1 tablet (10 mg total) by mouth daily. 90 tablet 3    rosuvastatin 20 MG Oral Tab Take 1 tablet (20 mg total) by mouth daily. 90 tablet 3    levothyroxine 75 MCG Oral Tab Take 1 tablet (75 mcg total) by mouth before breakfast. 90 tablet 3    albuterol 108 (90 Base) MCG/ACT Inhalation Aero Soln Inhale 2 puffs into the lungs every 4 (four) hours as needed for Wheezing. 54 g 0    hydrocortisone 2.5 % External Cream Apply 1 Application topically 2 (two) times daily. 28 g 0    cetirizine 10 MG Oral Tab Take 1 tablet (10 mg total) by mouth daily. 90 tablet 0    cholecalciferol 50 MCG (2000 UT) Oral Tab TAKE 1 TABLET BY MOUTH 1 TIME DAILY 90 tablet 3    aspirin 81 MG Oral Tab EC Take 1 tablet (81 mg total) by mouth daily.     [2]   Past Medical History:   Age-related nuclear cataract of both eyes    Asthma (HCC)    Disorder of thyroid    Floaters- per history, but none seen on exam    High blood pressure    High cholesterol    Hyperlipidemia    Hypothyroidism    PSC (posterior subcapsular cataract), right   [3]   Social History  Socioeconomic History    Marital status:    Tobacco Use    Smoking status: Former     Current packs/day: 0.00     Types: Cigarettes     Quit date:      Years since quittin.3     Passive exposure: Never    Smokeless tobacco: Never   Vaping Use    Vaping status: Never Used   Substance and Sexual Activity    Alcohol use: No    Drug use: No   Other Topics Concern    Caffeine Concern Yes     Comment: soda-1 cup/day    Pt has a pacemaker No    Pt has a defibrillator No     Reaction to local anesthetic No

## 2025-05-13 NOTE — PATIENT INSTRUCTIONS
Midline area on your hard palate is an osteoma called torus palatinus.  There is no necessary treatment for it.  There were no abnormalities explaining the left-sided tinnitus.  Your tuning fork examination suggest symmetric hearing.  You can consider an audiogram in the future should you desire so

## 2025-05-28 ENCOUNTER — TELEPHONE (OUTPATIENT)
Dept: FAMILY MEDICINE CLINIC | Facility: CLINIC | Age: 80
End: 2025-05-28

## 2025-08-04 RX ORDER — CHOLECALCIFEROL (VITAMIN D3) 50 MCG
2000 TABLET ORAL DAILY
Qty: 90 TABLET | Refills: 3 | Status: SHIPPED | OUTPATIENT
Start: 2025-08-04

## (undated) DIAGNOSIS — Z13.6 SCREENING FOR CARDIOVASCULAR CONDITION: ICD-10-CM

## (undated) DIAGNOSIS — R10.9 ABDOMINAL PAIN, UNSPECIFIED ABDOMINAL LOCATION: ICD-10-CM

## (undated) DIAGNOSIS — K44.9 HIATAL HERNIA: Primary | ICD-10-CM

## (undated) DIAGNOSIS — K29.50 CHRONIC GASTRITIS WITHOUT BLEEDING, UNSPECIFIED GASTRITIS TYPE: ICD-10-CM

## (undated) DIAGNOSIS — I70.0 ATHEROSCLEROSIS OF AORTA (HCC): ICD-10-CM

## (undated) DIAGNOSIS — I10 ESSENTIAL HYPERTENSION: ICD-10-CM

## (undated) DIAGNOSIS — K31.84 GASTROPARESIS: Primary | ICD-10-CM

## (undated) DIAGNOSIS — E78.2 MIXED HYPERLIPIDEMIA: ICD-10-CM

## (undated) DIAGNOSIS — M79.651 RIGHT THIGH PAIN: Primary | ICD-10-CM

## (undated) DEVICE — 35 ML SYRINGE REGULAR TIP: Brand: MONOJECT

## (undated) DEVICE — FORCEP RADIAL JAW 4

## (undated) DEVICE — Device: Brand: CUSTOM PROCEDURE KIT

## (undated) DEVICE — LINE MNTR ADLT SET O2 INTMD

## (undated) DEVICE — Device: Brand: DEFENDO AIR/WATER/SUCTION AND BIOPSY VALVE

## (undated) DEVICE — MEDI-VAC NON-CONDUCTIVE SUCTION TUBING 6MM X 1.8M (6FT.) L: Brand: CARDINAL HEALTH

## (undated) DEVICE — CONMED SCOPE SAVER BITE BLOCK, 20X27 MM: Brand: SCOPE SAVER

## (undated) NOTE — LETTER
[address] [place] [address]  Authorization for Invasive Procedures  1.  I hereby authorize Dr. Jace Husain , my physician and whomever may be designated as the doctor's assistant, to perform the following operation and/or procedure:  E occur: fever and allergic reactions, hemolytic reactions, transmission of disease such as hepatitis, AIDS, cytomegalovirus (CMV), and flluid overload.  In the event that I wish to have autologous transfusions of my own blood, or a directed donor transfusion Signature of Patient:  ________________________________________________ Date: _________Time: _________    Responsible person in case of minor or unconscious: _____________________________Relationship: ____________     Witness Signature: _______________

## (undated) NOTE — ED AVS SNAPSHOT
Sheela Alvarez   MRN: [id number]    Department:  [facility]   Date of Visit:  10/20/2017           Disclosure     Insurance plans vary and the physician(s) referred by the ER may not be covered by your plan.  Please contact yo CARE PHYSICIAN AT ONCE OR RETURN IMMEDIATELY TO THE EMERGENCY DEPARTMENT. If you have been prescribed any medication(s), please fill your prescription right away and begin taking the medication(s) as directed.   If you believe that any of the medications

## (undated) NOTE — LETTER
6/18/2019              Abi Nevarez         Dear Heriberto Martell,    It was a pleasure to see you. Your mammogram was normal.  There is no need for further testing at this time.   I look forward to seei

## (undated) NOTE — LETTER
08/11/21        [address]      Dear Aisha Hardy records indicate that you have outstanding lab work and or testing that was ordered for you and has not yet been completed:    MRI MRCP (W+WO)    Ledy

## (undated) NOTE — ED AVS SNAPSHOT
Maximus Robbins   MRN: [id number]    Department:  [facility] Emergency Department   Date of Visit:  11/23/2017           Disclosure     Insurance plans vary and the physician(s) referred by the ER may not be covered by your plan.  Please contact yo within the next three months to obtain basic health screening including reassessment of your blood pressure.     IF THERE IS ANY CHANGE OR WORSENING OF YOUR CONDITION, CALL YOUR PRIMARY CARE PHYSICIAN AT ONCE OR RETURN IMMEDIATELY TO THE EMERGENCY DEPARTMEN

## (undated) NOTE — Clinical Note
Dear Dr. Cruz,  I had the opportunity to see your patient Mary Robledo for an EMG recently. I appreciate your confidence in me to care for your patients. Please feel free call me with any questions at [phone] or contact me through Epic.  Sincerely, Wilian Cosby MD Board Certified, Physical Medicine and Rehabilitation Specializing in Sports Medicine, Spine Medicine and Electrodiagnostic Medicine [facility]  CC: Dr. Marrero

## (undated) NOTE — Clinical Note
You had elevated blood pressure today and you need to follow up with your doctor for a repeat blood pressure check and further discussion of lifestyle modifications that include Weight Reduction - Dietary Sodium Restriction - Increased Physical Activity  a

## (undated) NOTE — MR AVS SNAPSHOT
[facility]  [address] [place] [address], [address] [place] [address]               Thank you for choosing us for your health care visit with Jefferson Oneal MD.  We are glad to serve you and happy to provide you with this summary of Levothyroxine Sodium 75 MCG Tabs   Take 1 tablet (75 mcg total) by mouth before breakfast.   Commonly known as:  SYNTHROID, LEVOTHROID           Lovastatin 40 MG Tabs   TAKE ONE TABLET BY MOUTH EVERY DAY WITH EVENING MEAL           Montelukast Sodium 10 M

## (undated) NOTE — LETTER
Rock Marcio Md  [address] [place] [address]       07/01/21        Patient: Marino Almanzar   YOB: 1945   Date of Visit: 7/1/2021       Dear  Dr. Jenn Arteaga MD,      Thank you for referring Marino Almanzar to my practice.   Annel

## (undated) NOTE — Clinical Note
ASTHMA ACTION PLAN for Trae Rodriguez     : 1945          Date: 2017    Peggy Tom MD  [place] [address] [address]  [id number] [phone] 1.   GREEN - GO!  % [employer] Juan Antonio a copy of the plan was given to the patient/caregiver. Asthma Action Plan reviewed with patient (and caregiver if necessary) on the phone and mailed copy to patient.          Physician Patient Caretaker (if necessary)   MD Shauna Sabillon

## (undated) NOTE — ED AVS SNAPSHOT
[facility] Emergency Department    [address]     [place] [address]    Phone:  [phone]    Fax:  Maria Isabel Nolan 24   MRN: [id number]    Department:  [facility]   Date of Visit:  5/1/201 If you have difficulty scheduling your follow-up appointment as directed, please call our  at [phone]. Si tiene problemas para programar niko lizeth de seguimiento según lo indicado, llame al encargado de carlo al [phone].     It i continue to take your medications as instructed by your Primary Care doctor until you can check with your doctor. Please bring the medication list to your next doctor's appointment.     Any imaging studies and labs completed today can be reviewed in your [place] plans. To get signed up and covered, please call [phone] and ask to get set up for an insurance coverage that is in-network with [employer]. Mercedes     Sign up for [employer], your secure online medical record.   [employer]

## (undated) NOTE — ED AVS SNAPSHOT
[facility] Emergency Department    [address]     [place] [address]    Phone:  [phone]    Fax:  Maria Isabel Nolan 01   MRN: [id number]    Department:  [facility] Emergency Department   Date of Visit:  5/1/201 and Class Registration line at [phone] or find a doctor online by visiting [email or web address].    IF THERE IS ANY CHANGE OR WORSENING OF YOUR CONDITION, CALL YOUR PRIMARY CARE PHYSICIAN AT ONCE OR RETURN IMMEDIATELY TO [address] [place] [address].     If

## (undated) NOTE — ED AVS SNAPSHOT
Pranav Dempsey   MRN: [id number]    Department:  [facility] Emergency Department   Date of Visit:  2/7/2020           Disclosure     Insurance plans vary and the physician(s) referred by the ER may not be covered by your plan.  Please contact your within the next three months to obtain basic health screening including reassessment of your blood pressure.     IF THERE IS ANY CHANGE OR WORSENING OF YOUR CONDITION, CALL YOUR PRIMARY CARE PHYSICIAN AT ONCE OR RETURN IMMEDIATELY TO THE EMERGENCY DEPARTMEN

## (undated) NOTE — LETTER
January 23, 2017     Jeramy Becerril  [address]      Dear Twilla Bence:    Below are the results from your recent visit:  Your Mammogram is stable. Please repeat in 1 year.    Resulted Orders   CESAR SCREENING FABIO (CPT=77067) [place] = Skin tag or mole  BB = Nipple  Linear lolly = Scar   Square = Pain                      If you have any questions or concerns, please don't hesitate to call.      Christine Bai MD

## (undated) NOTE — LETTER
[facility]  Administration of Anesthesia  1. I, Shauna Francis, or _________________________________ acting on her behalf, (Patient) (Dependent/Representative) request to receive anesthesia for my pending procedure/operation/treatment.   A ph bleeding, seizure, cardiac arrest and death. 7. AWARENESS: I understand that it is possible (but unlikely) to have explicit memory of events from the operating room while under general anesthesia.   8. ELECTROCONVULSIVE THERAPY PATIENTS: This consent serve below affirms that prior to the time of the procedure, I have explained to the patient and/or his/her guardian, the risks and benefits of undergoing anesthesia, as well as any reasonable alternatives.     ___________________________________________________

## (undated) NOTE — ED AVS SNAPSHOT
[facility] Emergency Department    [address]     [place] [address]    Phone:  [phone]    Fax:  Maria Isabel Nolan 20   MRN: [id number]    Department:  [facility] Emergency Department   Date of Visit:  4/10/20 and Class Registration line at [phone] or find a doctor online by visiting [email or web address].    IF THERE IS ANY CHANGE OR WORSENING OF YOUR CONDITION, CALL YOUR PRIMARY CARE PHYSICIAN AT ONCE OR RETURN IMMEDIATELY TO [address] [place] [address].     If

## (undated) NOTE — LETTER
03/22/21        [address] [facility]      Dear Adan Booth records indicate that you have outstanding lab work and or testing that was ordered for you and has not yet been completed:  Orders Placed This Encount

## (undated) NOTE — MR AVS SNAPSHOT
[facility] SPECIALTY [facility] - [place] [address] [address]  [address]  [phone]               Thank you for choosing us for your health care visit with Mariposa Mora MD.  We are glad to serve you and happy to provide you with this summary of Referral Order Referred to Address [facility] Phone Visits Status Diagnosis                 MyChart     Sign up for [employer], your secure online medical record.   [employer] will allow you to access patient instructions from your recent visit,  view other health inf

## (undated) NOTE — LETTER
1/29/2018              [address]         Dear Alanna Mckoy,    It was a pleasure to see you. Your mammogram was normal.  There is no need for further testing at this time.   I look forward to seein

## (undated) NOTE — MR AVS SNAPSHOT
[facility] SPECIALTY [facility] - [place] [address] [address]  [address] [phone]               Thank you for choosing us for your health care visit with Tarah Mcclure MD.  We are glad to serve you and happy to provide you with this summary of BP Pulse Height Weight BMI    121/75 mmHg 68 5' 4\" (1.626 m) 135 lb (61.236 kg) 23.16 kg/m2         Current Medications          This list is accurate as of: 5/16/17  9:52 AM.  Always use your most recent med list.                Cindy Fass 250-50 [facility] Support Staff. Remember, MyChart is NOT to be used for urgent needs. For medical emergencies, dial 911. Educational Information     Your blood pressure indicates you may be at-risk for Hypertension.    Please consider the following Lifestyle Modific

## (undated) NOTE — LETTER
Coronavirus Disease 2019 (COVID-19)     [place] [address] is committed to the safety and well-being of our patients, members, employees, and communities.  As concerns arise about the new strain of coronavirus that causes COVID-19, [place] [address] medical appointment, call the healthcare provider ahead of time and tell them that you have or may have COVID-19.  5. For medical emergencies, call 911 and notify the dispatch personnel that you have or may have COVID-19.   6. Cover your cough and sneezes. days have passed since symptoms first appeared OR if asymptomatic patient or date of symptom onset is unclear then use 10 days post COVID test date.    · At least 20 days have passed for severe illness (requiring hospitalization) OR if you are immunocomprom required to have a repeat COVID-19 test in order to be eligible to donate. If you’re instructed by Jerrell Conn that a repeat test is required, please contact the [address] COVID-19 Nurse Triage Line at [phone].     Additional Information      You can

## (undated) NOTE — MR AVS SNAPSHOT
[facility] SPECIALTY \[facility] [place] [facility]"" - [place] [address] [address]  [address] [phone]               Thank you for choosing us for your health care visit with Peggy Tom MD.  We are glad to serve you and happy to provide you with this summary of Facility call Central Scheduling at   [phone].          Reason for Today's Visit     ER F/U     Weakness     Hyperlipidemia           Medical Issues Discussed Today     Asthma with COPD (chronic obstructive pulmonary disease) (HCC)    Hyperlipidemia, information, go to [email or web address] and click on the Sign Up Now link in the [employer] box. Enter your [employer] Activation Code exactly as it appears below along with your Zip Code and Date of Birth to complete the sign-up process.  If you do

## (undated) NOTE — LETTER
October 8, 2019     Barbara Ho  [address] [place] [address]      Dear Edvin Innocent:    Below are the results from your recent visit: Potassium level is fine continue on the current medication regimen     Resulted Orders   BASIC METABO